# Patient Record
Sex: FEMALE | Race: WHITE | Employment: FULL TIME
[De-identification: names, ages, dates, MRNs, and addresses within clinical notes are randomized per-mention and may not be internally consistent; named-entity substitution may affect disease eponyms.]

---

## 2017-07-15 ENCOUNTER — HEALTH MAINTENANCE LETTER (OUTPATIENT)
Age: 27
End: 2017-07-15

## 2017-07-20 DIAGNOSIS — F41.9 ANXIETY DISORDER: Primary | ICD-10-CM

## 2017-07-20 LAB
HCG SERPL QL: NEGATIVE
T4 FREE SERPL-MCNC: 0.94 NG/DL (ref 0.76–1.46)
TSH SERPL DL<=0.05 MIU/L-ACNC: 1.17 MU/L (ref 0.4–4)

## 2017-07-20 PROCEDURE — 84439 ASSAY OF FREE THYROXINE: CPT | Performed by: PHYSICIAN ASSISTANT

## 2017-07-20 PROCEDURE — 84703 CHORIONIC GONADOTROPIN ASSAY: CPT | Performed by: PHYSICIAN ASSISTANT

## 2017-07-20 PROCEDURE — 84443 ASSAY THYROID STIM HORMONE: CPT | Performed by: PHYSICIAN ASSISTANT

## 2017-07-20 PROCEDURE — 36415 COLL VENOUS BLD VENIPUNCTURE: CPT | Performed by: PHYSICIAN ASSISTANT

## 2017-10-06 ENCOUNTER — OFFICE VISIT (OUTPATIENT)
Dept: FAMILY MEDICINE | Facility: CLINIC | Age: 27
End: 2017-10-06
Payer: COMMERCIAL

## 2017-10-06 VITALS
BODY MASS INDEX: 25.96 KG/M2 | HEIGHT: 65 IN | SYSTOLIC BLOOD PRESSURE: 107 MMHG | HEART RATE: 65 BPM | WEIGHT: 155.8 LBS | DIASTOLIC BLOOD PRESSURE: 70 MMHG | TEMPERATURE: 98.3 F

## 2017-10-06 DIAGNOSIS — A08.4 VIRAL GASTROENTERITIS: Primary | ICD-10-CM

## 2017-10-06 PROCEDURE — 99213 OFFICE O/P EST LOW 20 MIN: CPT | Performed by: PHYSICIAN ASSISTANT

## 2017-10-06 RX ORDER — LEVONORGESTREL AND ETHINYL ESTRADIOL 0.15-0.03
1 KIT ORAL DAILY
COMMUNITY
End: 2017-10-13

## 2017-10-06 NOTE — PATIENT INSTRUCTIONS
Viral Gastroenteritis (Adult)    Gastroenteritis is commonly called the stomach flu. It is most often caused by a virus that affects the stomach and intestinal tract and usually lasts from 2 to 7 days. Common viruses causing gastroenteritis include norovirus, rotavirus, and hepatitis A. Non-viral causes of gastroenteritis include bacteria, parasites, and toxins.  The danger from repeated vomiting or diarrhea is dehydration. This is the loss of too much fluid from the body. When this occurs, body fluids must be replaced. Antibiotics do not help with this illness because it is usually viral.Simple home treatment will be helpful.  Symptoms of viral gastroenteritis may include:    Watery, loose stools    Stomach pain or abdominal cramps    Fever and chills    Nausea and vomiting    Loss of bowel control    Headache  Home care  Gastroenteritis is transmitted by contact with the stool or vomit of an infected person. This can occur from person to person or from contact with a contaminated surface.  Follow these guidelines when caring for yourself at home:    If symptoms are severe, rest at home for the next 24 hours or until you are feeling better.    Wash your hands with soap and water or use alcohol-based  to prevent the spread of infection. Wash your hands after touching anyone who is sick.    Wash your hands or use alcohol-based  after using the toilet and before meals. Clean the toilet after each use.  Remember these tips when preparing food:    People with diarrhea should not prepare or serve food to others. When preparing foods, wash your hands before and after.    Wash your hands after using cutting boards, countertops, knives, or utensils that have been in contact with raw food.    Keep uncooked meats away from cooked and ready-to-eat foods.  Medicine  You may use acetaminophen or NSAID medicines like ibuprofen or naproxen to control fever unless another medicine was given. If you have chronic  liver or kidney disease, talk with your healthcare provider before using these medicines. Also talk with your provider if you've had a stomach ulcer or gastrointestinal bleeding. Don't give aspirin to anyone under 18 years of age who is ill with a fever. It may cause severe liver damage. Don't use NSAIDS is you are already taking one for another condition (like arthritis) or are on aspirin (such as for heart disease or after a stroke).  If medicine for vomiting or diarrhea are prescribed, take these only as directed. Do not take over-the-counter medicines for vomiting or diarrhea unless instructed by your healthcare provider.  Diet  Follow these guidelines for food:    Water and liquids are important so you don't get dehydrated. Drink a small amount at a time or suck on ice chips if you are vomiting.    If you eat, avoid fatty, greasy, spicy, or fried foods.    Don't eat dairy if you have diarrhea. This can make diarrhea worse.    Avoid tobacco, alcohol, and caffeine which may worsen symptoms.  During the first 24 hours (the first full day), follow the diet below:    Beverages. Sports drinks, soft drinks without caffeine, ginger ale, mineral water (plain or flavored), decaffeinated tea and coffee. If you are very dehydrated, sports drinks aren't a good choice. They have too much sugar and not enough electrolytes. In this case, commercially available products called oral rehydration solutions, are best.    Soups. Eat clear broth, consommé, and bouillon.    Desserts. Eat gelatin, popsicles, and fruit juice bars.  During the next 24 hours (the second day), you may add the following to the above:    Hot cereal, plain toast, bread, rolls, and crackers    Plain noodles, rice, mashed potatoes, chicken noodle or rice soup    Unsweetened canned fruit (avoid pineapple), bananas    Limit fat intake to less than 15 grams per day. Do this by avoiding margarine, butter, oils, mayonnaise, sauces, gravies, fried foods, peanut  butter, meat, poultry, and fish.    Limit fiber and avoid raw or cooked vegetables, fresh fruits (except bananas), and bran cereals.    Limit caffeine and chocolate. Don't use spices or seasonings other than salt.    Limit dairy products.    Avoid alcohol.  During the next 24 hours:    Gradually resume a normal diet as you feel better and your symptoms improve.    If at any time it starts getting worse again, go back to clear liquids until you feel better.  Follow-up care  Follow up with your healthcare provider, or as advised. Call your provider if you don't get better within 24 hours or if diarrhea lasts more than a week. Also follow up if you are unable to keep down liquids and get dehydrated. If a stool (diarrhea) sample was taken, call as directed for the results.  Call 911  Call 911 if any of these occur:    Trouble breathing    Chest pain    Confused    Severe drowsiness or trouble awakening    Fainting or loss of consciousness    Rapid heart rate    Seizure    Stiff neck  When to seek medical advice  Call your healthcare provider right away if any of these occur:    Abdominal pain that gets worse    Continued vomiting (unable to keep liquids down)    Frequent diarrhea (more than 5 times a day)    Blood in vomit or stool (black or red color)    Dark urine, reduced urine output, or extreme thirst    Weakness or dizziness    Drowsiness    Fever of 100.4 F (38 C) oral or higher that does not get better with fever medicine    New rash  Date Last Reviewed: 1/3/2016    0525-6542 [employer]. [address] [place] [address], [place] [address]. All rights reserved. This information is not intended as a substitute for professional medical care. Always follow your healthcare professional's instructions.

## 2017-10-06 NOTE — NURSING NOTE
"Chief Complaint   Patient presents with     Diarrhea       Initial /70  Pulse 65  Temp 98.3  F (36.8  C) (Tympanic)  Ht 5' 5.25\" (1.657 m)  Wt 155 lb 12.8 oz (70.7 kg)  LMP 09/13/2017 (Approximate)  Breastfeeding? No  BMI 25.73 kg/m2 Estimated body mass index is 25.73 kg/(m^2) as calculated from the following:    Height as of this encounter: 5' 5.25\" (1.657 m).    Weight as of this encounter: 155 lb 12.8 oz (70.7 kg).  Medication Reconciliation: complete     SMA L[facility]      "

## 2017-10-06 NOTE — PROGRESS NOTES
SUBJECTIVE:   Eliz Franco is a 27 year old female who presents to clinic today for the following health issues:      Diarrhea  Duration of complaint: Last night   Description:       Consistency of stool: loose       Blood in stool: no        Number of loose stools past 24 hours: 8       Fever: no        Nausea/vomitting: YES- nausea       Abdominal pain: YES- cramping       Weight loss: no        Recent antibiotics: no        Recent travel: no        Any contacts ill: YES- exposure at work  Therapies tried and outcome: PeptoBismol     *Needs a note for work, had to call in.    No blood or black in the stool.    No recent travel.                Problem list and histories reviewed & adjusted, as indicated.  Additional history: as documented    Patient Active Problem List   Diagnosis     Excessive or frequent menstruation     Other specified gastritis without mention of hemorrhage     CARDIOVASCULAR SCREENING; LDL GOAL LESS THAN 130     Adjustment disorder with mixed anxiety and depressed mood     History reviewed. No pertinent surgical history.    Social History   Substance Use Topics     Smoking status: Passive Smoke Exposure - Never Smoker     Smokeless tobacco: Never Used      Comment: DAD SMOKES OUTSIDE     Alcohol use No     Family History   Problem Relation Age of Onset     DIABETES Mother          Current Outpatient Prescriptions   Medication Sig Dispense Refill     ESCITALOPRAM OXALATE PO Take 10 mg by mouth daily       HYDROXYZINE HCL PO Take 25 mg by mouth 3 times daily as needed for itching       levonorgestrel-ethinyl estradiol (NORDETTE) 0.15-30 MG-MCG per tablet Take 1 tablet by mouth daily       BP Readings from Last 3 Encounters:   10/06/17 107/70   04/16/14 96/61   03/19/14 110/66    Wt Readings from Last 3 Encounters:   10/06/17 155 lb 12.8 oz (70.7 kg)   04/16/14 134 lb 9.6 oz (61.1 kg)   03/19/14 134 lb (60.8 kg)                        Reviewed and updated as needed this visit by clinical  "staff     Reviewed and updated as needed this visit by Provider         ROS:  Constitutional, HEENT, cardiovascular, pulmonary, gi and gu systems are negative, except as otherwise noted.      OBJECTIVE:   /70  Pulse 65  Temp 98.3  F (36.8  C) (Tympanic)  Ht 5' 5.25\" (1.657 m)  Wt 155 lb 12.8 oz (70.7 kg)  LMP 09/13/2017 (Approximate)  Breastfeeding? No  BMI 25.73 kg/m2  Body mass index is 25.73 kg/(m^2).  GENERAL: healthy, alert and no distress  ABDOMEN: soft, nontender, no hepatosplenomegaly, no masses and bowel sounds normal    Diagnostic Test Results:  none     ASSESSMENT/PLAN:         1. Viral gastroenteritis      Patient was educated on causes of gastroenteritis and the natural course of this illness.  I reviewed the importance of hydration with appropriate solute.  Avoidance of dairy may be reasonable for a few days.   I recommend patient avoid anti-diarrheal medications, such as immodium, for the time being.    Diet: BRAT diet and advance diet as tolerated      I reviewed the signs and symptoms associated with dehydration or more severe illness including fever and listlessness.  Call or return immediately if these occur.  Patient is to f/u if diarrhea persists for greater than 7 days, at which time, we will check stool cultures.      Pap scheduled for next week.    Letter for work.      FUTURE APPOINTMENTS:       - Follow-up visit if symptoms worsen or fail to improve as anticipated.     Silvia Hu PA-C  [facility]"

## 2017-10-06 NOTE — MR AVS SNAPSHOT
After Visit Summary   10/6/2017    Eliz Franco    MRN: [id number]           Patient Information     Date Of Birth          1990        Visit Information        Provider Department      10/6/2017 2:00 PM Silvia Hu PA-C [facility]        Today's Diagnoses     Viral gastroenteritis    -  1      Care Instructions      Viral Gastroenteritis (Adult)    Gastroenteritis is commonly called the stomach flu. It is most often caused by a virus that affects the stomach and intestinal tract and usually lasts from 2 to 7 days. Common viruses causing gastroenteritis include norovirus, rotavirus, and hepatitis A. Non-viral causes of gastroenteritis include bacteria, parasites, and toxins.  The danger from repeated vomiting or diarrhea is dehydration. This is the loss of too much fluid from the body. When this occurs, body fluids must be replaced. Antibiotics do not help with this illness because it is usually viral.Simple home treatment will be helpful.  Symptoms of viral gastroenteritis may include:    Watery, loose stools    Stomach pain or abdominal cramps    Fever and chills    Nausea and vomiting    Loss of bowel control    Headache  Home care  Gastroenteritis is transmitted by contact with the stool or vomit of an infected person. This can occur from person to person or from contact with a contaminated surface.  Follow these guidelines when caring for yourself at home:    If symptoms are severe, rest at home for the next 24 hours or until you are feeling better.    Wash your hands with soap and water or use alcohol-based  to prevent the spread of infection. Wash your hands after touching anyone who is sick.    Wash your hands or use alcohol-based  after using the toilet and before meals. Clean the toilet after each use.  Remember these tips when preparing food:    People with diarrhea should not prepare or serve food to others. When preparing foods, wash  your hands before and after.    Wash your hands after using cutting boards, countertops, knives, or utensils that have been in contact with raw food.    Keep uncooked meats away from cooked and ready-to-eat foods.  Medicine  You may use acetaminophen or NSAID medicines like ibuprofen or naproxen to control fever unless another medicine was given. If you have chronic liver or kidney disease, talk with your healthcare provider before using these medicines. Also talk with your provider if you've had a stomach ulcer or gastrointestinal bleeding. Don't give aspirin to anyone under 18 years of age who is ill with a fever. It may cause severe liver damage. Don't use NSAIDS is you are already taking one for another condition (like arthritis) or are on aspirin (such as for heart disease or after a stroke).  If medicine for vomiting or diarrhea are prescribed, take these only as directed. Do not take over-the-counter medicines for vomiting or diarrhea unless instructed by your healthcare provider.  Diet  Follow these guidelines for food:    Water and liquids are important so you don't get dehydrated. Drink a small amount at a time or suck on ice chips if you are vomiting.    If you eat, avoid fatty, greasy, spicy, or fried foods.    Don't eat dairy if you have diarrhea. This can make diarrhea worse.    Avoid tobacco, alcohol, and caffeine which may worsen symptoms.  During the first 24 hours (the first full day), follow the diet below:    Beverages. Sports drinks, soft drinks without caffeine, ginger ale, mineral water (plain or flavored), decaffeinated tea and coffee. If you are very dehydrated, sports drinks aren't a good choice. They have too much sugar and not enough electrolytes. In this case, commercially available products called oral rehydration solutions, are best.    Soups. Eat clear broth, consommé, and bouillon.    Desserts. Eat gelatin, popsicles, and fruit juice bars.  During the next 24 hours (the second day),  you may add the following to the above:    Hot cereal, plain toast, bread, rolls, and crackers    Plain noodles, rice, mashed potatoes, chicken noodle or rice soup    Unsweetened canned fruit (avoid pineapple), bananas    Limit fat intake to less than 15 grams per day. Do this by avoiding margarine, butter, oils, mayonnaise, sauces, gravies, fried foods, peanut butter, meat, poultry, and fish.    Limit fiber and avoid raw or cooked vegetables, fresh fruits (except bananas), and bran cereals.    Limit caffeine and chocolate. Don't use spices or seasonings other than salt.    Limit dairy products.    Avoid alcohol.  During the next 24 hours:    Gradually resume a normal diet as you feel better and your symptoms improve.    If at any time it starts getting worse again, go back to clear liquids until you feel better.  Follow-up care  Follow up with your healthcare provider, or as advised. Call your provider if you don't get better within 24 hours or if diarrhea lasts more than a week. Also follow up if you are unable to keep down liquids and get dehydrated. If a stool (diarrhea) sample was taken, call as directed for the results.  Call 911  Call 911 if any of these occur:    Trouble breathing    Chest pain    Confused    Severe drowsiness or trouble awakening    Fainting or loss of consciousness    Rapid heart rate    Seizure    Stiff neck  When to seek medical advice  Call your healthcare provider right away if any of these occur:    Abdominal pain that gets worse    Continued vomiting (unable to keep liquids down)    Frequent diarrhea (more than 5 times a day)    Blood in vomit or stool (black or red color)    Dark urine, reduced urine output, or extreme thirst    Weakness or dizziness    Drowsiness    Fever of 100.4 F (38 C) oral or higher that does not get better with fever medicine    New rash  Date Last Reviewed: 1/3/2016    5669-1178 [employer]. [address] [place] [address], [place] [address]. All rights  "reserved. This information is not intended as a substitute for professional medical care. Always follow your healthcare professional's instructions.                Follow-ups after your visit        Your next 10 appointments already scheduled     Oct 13, 2017  3:20 PM CDT   Office Visit with Silvia Hu PA-C   [facility] ([facility])    [address] [facility] [address] [facility] [address]   [phone]           Bring a current list of meds and any records pertaining to this visit. For Physicals, please bring immunization records and any forms needing to be filled out. Please arrive 10 minutes early to complete paperwork.              Who to contact     Normal or non-critical lab and imaging results will be communicated to you by [employer], letter or phone within 4 business days after the clinic has received the results. If you do not hear from us within 7 days, please contact the clinic through [employer] or phone. If you have a critical or abnormal lab result, we will notify you by phone as soon as possible.  Submit refill requests through [email or web address] or call your pharmacy and they will forward the refill request to us. Please allow 3 business days for your refill to be completed.          If you need to speak with a  for additional information , please call: [phone]           Additional Information About Your Visit        [email or web address] Information     [email or web address] lets you send messages to your doctor, view your test results, renew your prescriptions, schedule appointments and more. To sign up, go to [email or web address] . Click on \"Log in\" on the left side of the screen, which will take you to the Welcome page. Then click on \"Sign up Now\" on the right side of the page.     You will be asked to enter the access code listed below, as well as some personal information. Please follow the directions to create your username and password.     Your access code is: " [email or web address]  Expires: 2018  2:17 PM     Your access code will  in 90 days. If you need help or a new code, please call your [place] clinic or [phone].        Care EveryWhere ID     This is your Care EveryWhere ID. This could be used by other organizations to access your [facility] medical records  [id number]        Your Vitals Were     Pulse Temperature Height Last Period Breastfeeding? BMI (Body Mass Index)    65 98.3  F (36.8  C) (Tympanic) 5' 5.25\" (1.657 m) 2017 (Approximate) No 25.73 kg/m2       Blood Pressure from Last 3 Encounters:   10/06/17 107/70   14 96/61   14 110/66    Weight from Last 3 Encounters:   10/06/17 155 lb 12.8 oz (70.7 kg)   14 134 lb 9.6 oz (61.1 kg)   14 134 lb (60.8 kg)              Today, you had the following     No orders found for display       Primary Care Provider Office Phone # Fax #    Linda Arreola [phone] [address] [facility] [address]        Equal Access to Services     GREGG NEAL AH: Hadii aad ku hadasho Soomaali, waaxda luqadaha, qaybta kaalmada adeegyada, waxay idiin haymichaeln ismael gregory la'uli ah. So [facility] [phone].    ATENCIÓN: Si habla español, tiene a rudd disposición servicios gratuitos de asistencia lingüística. Llame al [phone].    We comply with applicable federal civil rights laws and [place] laws. We do not discriminate on the basis of race, color, national origin, age, disability, sex, sexual orientation, or gender identity.            Thank you!     Thank you for choosing [facility]  for your care. Our goal is always to provide you with excellent care. Hearing back from our patients is one way we can continue to improve our services. Please take a few minutes to complete the written survey that you may receive in the mail after your visit with us. Thank you!             Your Updated Medication List - Protect others around you: Learn how to safely use, " store and throw away your medicines at www.disposemymeds.org.          This list is accurate as of: 10/6/17  2:17 PM.  Always use your most recent med list.                   Brand Name Dispense Instructions for use Diagnosis    ESCITALOPRAM OXALATE PO      Take 10 mg by mouth daily        HYDROXYZINE HCL PO      Take 25 mg by mouth 3 times daily as needed for itching        levonorgestrel-ethinyl estradiol 0.15-30 MG-MCG per tablet    DAVID     Take 1 tablet by mouth daily

## 2017-10-06 NOTE — LETTER
[address] [address]  Phone: [phone]    October 6, 2017        Eliz Franco  [address] [place] [address]          To whom it may concern:    RE: Eliz Franco    Patient was seen and treated today at our clinic for gastroenteritis and missed work.  She may return to work when she is feeling better.     Please contact me for questions or concerns.      Sincerely,        Silvia Hu PA-C

## 2017-10-13 ENCOUNTER — OFFICE VISIT (OUTPATIENT)
Dept: FAMILY MEDICINE | Facility: CLINIC | Age: 27
End: 2017-10-13
Payer: COMMERCIAL

## 2017-10-13 VITALS
SYSTOLIC BLOOD PRESSURE: 105 MMHG | WEIGHT: 158.6 LBS | DIASTOLIC BLOOD PRESSURE: 60 MMHG | TEMPERATURE: 98.5 F | BODY MASS INDEX: 26.42 KG/M2 | HEIGHT: 65 IN | HEART RATE: 65 BPM

## 2017-10-13 DIAGNOSIS — Z00.00 ROUTINE GENERAL MEDICAL EXAMINATION AT A HEALTH CARE FACILITY: Primary | ICD-10-CM

## 2017-10-13 DIAGNOSIS — Z28.21 INFLUENZA VACCINATION DECLINED BY PATIENT: ICD-10-CM

## 2017-10-13 DIAGNOSIS — F43.23 ADJUSTMENT DISORDER WITH MIXED ANXIETY AND DEPRESSED MOOD: ICD-10-CM

## 2017-10-13 DIAGNOSIS — Z30.41 ENCOUNTER FOR SURVEILLANCE OF CONTRACEPTIVE PILLS: ICD-10-CM

## 2017-10-13 PROCEDURE — 87491 CHLMYD TRACH DNA AMP PROBE: CPT | Performed by: PHYSICIAN ASSISTANT

## 2017-10-13 PROCEDURE — G0145 SCR C/V CYTO,THINLAYER,RESCR: HCPCS | Performed by: PHYSICIAN ASSISTANT

## 2017-10-13 PROCEDURE — 87591 N.GONORRHOEAE DNA AMP PROB: CPT | Performed by: PHYSICIAN ASSISTANT

## 2017-10-13 PROCEDURE — 99395 PREV VISIT EST AGE 18-39: CPT | Performed by: PHYSICIAN ASSISTANT

## 2017-10-13 RX ORDER — LEVONORGESTREL AND ETHINYL ESTRADIOL 0.15-0.03
1 KIT ORAL DAILY
Qty: 90 TABLET | Refills: 3 | Status: SHIPPED | OUTPATIENT
Start: 2017-10-13 | End: 2018-09-24

## 2017-10-13 NOTE — PROGRESS NOTES
SUBJECTIVE:   CC: Eliz Franco is an 27 year old woman who presents for preventive health visit.     Healthy Habits:    Do you get at least three servings of calcium containing foods daily (dairy, green leafy vegetables, etc.)? yes    Amount of exercise or daily activities, outside of work: 1 day(s) per week    Problems taking medications regularly No    Medication side effects: No    Have you had an eye exam in the past two years? yes    Do you see a dentist twice per year? yes    Do you have sleep apnea, excessive snoring or daytime drowsiness?no        Depression/anxiety:  Managed by [facility].       Today's PHQ-2 Score: PHQ-2 ( 1999 Pfizer) 10/6/2017 4/16/2014   Q1: Little interest or pleasure in doing things 0 1   Q2: Feeling down, depressed or hopeless 0 0   PHQ-2 Score 0 1         Abuse: Current or Past(Physical, Sexual or Emotional)- No  Do you feel safe in your environment - Yes  Social History   Substance Use Topics     Smoking status: Passive Smoke Exposure - Never Smoker     Smokeless tobacco: Never Used      Comment: DAD SMOKES OUTSIDE     Alcohol use No     The patient does not drink >3 drinks per day nor >7 drinks per week.    Reviewed orders with patient.  Reviewed health maintenance and updated orders accordingly - Yes  Labs reviewed in EPIC  BP Readings from Last 3 Encounters:   10/13/17 105/60   10/06/17 107/70   04/16/14 96/61    Wt Readings from Last 3 Encounters:   10/13/17 158 lb 9.6 oz (71.9 kg)   10/06/17 155 lb 12.8 oz (70.7 kg)   04/16/14 134 lb 9.6 oz (61.1 kg)                  Current Outpatient Prescriptions   Medication Sig Dispense Refill     levonorgestrel-ethinyl estradiol (NORDETTE) 0.15-30 MG-MCG per tablet Take 1 tablet by mouth daily 90 tablet 3     ESCITALOPRAM OXALATE PO Take 10 mg by mouth daily       HYDROXYZINE HCL PO Take 25 mg by mouth 3 times daily as needed for itching       [DISCONTINUED] levonorgestrel-ethinyl estradiol (NORDETTE) 0.15-30 MG-MCG  per tablet Take 1 tablet by mouth daily           Mammogram not appropriate for this patient based on age.    Pertinent mammograms are reviewed under the imaging tab.  History of abnormal Pap smear: NO - age 21-29 PAP every 3 years recommended    Reviewed and updated as needed this visit by clinical staff         Reviewed and updated as needed this visit by Provider              ROS:  C: NEGATIVE for fever, chills, change in weight  I: NEGATIVE for worrisome rashes, moles or lesions  E: NEGATIVE for vision changes or irritation  ENT: NEGATIVE for ear, mouth and throat problems  R: NEGATIVE for significant cough or SOB  B: NEGATIVE for masses, tenderness or discharge  CV: NEGATIVE for chest pain, palpitations or peripheral edema  GI: NEGATIVE for nausea, abdominal pain, heartburn, or change in bowel habits  : NEGATIVE for unusual urinary or vaginal symptoms. Periods are regular.  M: NEGATIVE for significant arthralgias or myalgia  N: NEGATIVE for weakness, dizziness or paresthesias  P: NEGATIVE for changes in mood or affect    OBJECTIVE:   LMP 09/13/2017 (Approximate)  EXAM:  GENERAL: healthy, alert and no distress  EYES: Eyes grossly normal to inspection, PERRL and conjunctivae and sclerae normal  HENT: ear canals and TM's normal, nose and mouth without ulcers or lesions  NECK: no adenopathy, no asymmetry, masses, or scars and thyroid normal to palpation  RESP: lungs clear to auscultation - no rales, rhonchi or wheezes  BREAST: normal without masses, tenderness or nipple discharge and no palpable axillary masses or adenopathy  CV: regular rate and rhythm, normal S1 S2, no S3 or S4, no murmur, click or rub, no peripheral edema and peripheral pulses strong  ABDOMEN: soft, nontender, no hepatosplenomegaly, no masses and bowel sounds normal   (female): normal female external genitalia, normal urethral meatus, vaginal mucosa pink, moist, well rugated, and normal cervix/adnexa/uterus without masses or discharge  MS:  "no gross musculoskeletal defects noted, no edema  SKIN: no suspicious lesions or rashes  NEURO: Normal strength and tone, mentation intact and speech normal  PSYCH: mentation appears normal, affect normal/bright    ASSESSMENT/PLAN:   1. Routine general medical examination at a health care facility       HEALTH CARE MAINTENANCE              Reviewed USPTF recommendations and anticipatory guidance.              See orders.   I discussed in detail with Eliz Franco the importance of cervical cancer screenings through pap smears, will repeat pap every 3 year(s).        - NEISSERIA GONORRHOEA PCR  - CHLAMYDIA TRACHOMATIS PCR  - Pap imaged thin layer screen reflex to HPV if ASCUS - recommend age 25 - 29    2. Adjustment disorder with mixed anxiety and depressed mood  Managed by [facility]    3. Encounter for surveillance of contraceptive pills  Doing well.   - levonorgestrel-ethinyl estradiol (NORDETTE) 0.15-30 MG-MCG per tablet; Take 1 tablet by mouth daily  Dispense: 90 tablet; Refill: 3    4. Influenza vaccination declined by patient        COUNSELING:   Reviewed preventive health counseling, as reflected in patient instructions       Regular exercise       Healthy diet/nutrition       Contraception       Safe sex practices/STD prevention         reports that she is a non-smoker but has been exposed to tobacco smoke. She has never used smokeless tobacco.    Estimated body mass index is 25.73 kg/(m^2) as calculated from the following:    Height as of 10/6/17: 5' 5.25\" (1.657 m).    Weight as of 10/6/17: 155 lb 12.8 oz (70.7 kg).         Counseling Resources:  ATP IV Guidelines  Pooled Cohorts Equation Calculator  Breast Cancer Risk Calculator  FRAX Risk Assessment  ICSI Preventive Guidelines  Dietary Guidelines for Americans, 2010  USDA's MyPlate  ASA Prophylaxis  Lung CA Screening    Silvia Hu PA-C  [facility]  "

## 2017-10-13 NOTE — MR AVS SNAPSHOT
After Visit Summary   10/13/2017    Eliz Franco    MRN: [id number]           Patient Information     Date Of Birth          1990        Visit Information        Provider Department      10/13/2017 3:20 PM Silvia Hu PA-C [facility]        Today's Diagnoses     Routine general medical examination at a health care facility    -  1    Adjustment disorder with mixed anxiety and depressed mood        Encounter for surveillance of contraceptive pills        Influenza vaccination declined by patient          Care Instructions      Preventive Health Recommendations  Female Ages 18 to 25     Yearly exam:     See your health care provider every year in order to  o Review health changes.   o Discuss preventive care.    o Review your medicines if your doctor has prescribed any.      You should be tested each year for STDs (sexually transmitted diseases).       After age 20, talk to your provider about how often you should have cholesterol testing.      Starting at age 21, get a Pap test every three years. If you have an abnormal result, your doctor may have you test more often.      If you are at risk for diabetes, you should have a diabetes test (fasting glucose).     Shots:     Get a flu shot each year.     Get a tetanus shot every 10 years.     Consider getting the shot (vaccine) that prevents cervical cancer (Gardasil).    Nutrition:     Eat at least 5 servings of fruits and vegetables each day.    Eat whole-grain bread, whole-wheat pasta and brown rice instead of white grains and rice.    Talk to your provider about Calcium and Vitamin D.     Lifestyle    Exercise at least 150 minutes a week each week (30 minutes a day, 5 days a week). This will help you control your weight and prevent disease.    Limit alcohol to one drink per day.    No smoking.     Wear sunscreen to prevent skin cancer.    See your dentist every six months for an exam and cleaning.          Follow-ups  "after your visit        Who to contact     Normal or non-critical lab and imaging results will be communicated to you by iHELP Worldhart, letter or phone within 4 business days after the clinic has received the results. If you do not hear from us within 7 days, please contact the clinic through iHELP Worldhart or phone. If you have a critical or abnormal lab result, we will notify you by phone as soon as possible.  Submit refill requests through IceBreaker or call your pharmacy and they will forward the refill request to us. Please allow 3 business days for your refill to be completed.          If you need to speak with a  for additional information , please call: [phone]           Additional Information About Your Visit        IceBreaker Information     IceBreaker lets you send messages to your doctor, view your test results, renew your prescriptions, schedule appointments and more. To sign up, go to [email or web address] . Click on \"Log in\" on the left side of the screen, which will take you to the Welcome page. Then click on \"Sign up Now\" on the right side of the page.     You will be asked to enter the access code listed below, as well as some personal information. Please follow the directions to create your username and password.     Your access code is: [email or web address]  Expires: 2018  2:17 PM     Your access code will  in 90 days. If you need help or a new code, please call your [facility] clinic or [phone].        Care EveryWhere ID     This is your Care EveryWhere ID. This could be used by other organizations to access your [facility] medical records  [id number]        Your Vitals Were     Pulse Temperature Height Last Period Breastfeeding? BMI (Body Mass Index)    65 98.5  F (36.9  C) (Tympanic) 5' 5.25\" (1.657 m) 2017 (Approximate) No 26.19 kg/m2       Blood Pressure from Last 3 Encounters:   10/13/17 105/60   10/06/17 107/70   14 96/61    Weight from Last 3 Encounters:   10/13/17 158 " lb 9.6 oz (71.9 kg)   10/06/17 155 lb 12.8 oz (70.7 kg)   04/16/14 134 lb 9.6 oz (61.1 kg)              We Performed the Following     CHLAMYDIA TRACHOMATIS PCR     NEISSERIA GONORRHOEA PCR     Pap imaged thin layer screen reflex to HPV if ASCUS - recommend age 25 - 29          Where to get your medicines      These medications were sent to [facility] [id number] - [address]  AT [facility] & [address] , [facility] [address]     Phone:  [phone]     levonorgestrel-ethinyl estradiol 0.15-30 MG-MCG per tablet          Primary Care Provider Office Phone # Fax #    Linda Arreola [phone] [address] [facility] [address]        Equal Access to Services     [facility] : Hadii marlon carlisle hadasho Soomaali, waaxda luqadaha, qaybta kaalmada adeegyada, theron parsons hayuli hernandez . So [facility] [phone].    ATENCIÓN: Si habla español, tiene a rudd disposición servicios gratuitos de asistencia lingüística. Llame al [phone].    We comply with applicable federal civil rights laws and [place] laws. We do not discriminate on the basis of race, color, national origin, age, disability, sex, sexual orientation, or gender identity.            Thank you!     Thank you for choosing [facility]  for your care. Our goal is always to provide you with excellent care. Hearing back from our patients is one way we can continue to improve our services. Please take a few minutes to complete the written survey that you may receive in the mail after your visit with us. Thank you!             Your Updated Medication List - Protect others around you: Learn how to safely use, store and throw away your medicines at www.disposemymeds.org.          This list is accurate as of: 10/13/17  3:41 PM.  Always use your most recent med list.                   Brand Name Dispense Instructions for use Diagnosis    ESCITALOPRAM OXALATE PO       Take 10 mg by mouth daily        HYDROXYZINE HCL PO      Take 25 mg by mouth 3 times daily as needed for itching        levonorgestrel-ethinyl estradiol 0.15-30 MG-MCG per tablet    NORDETTE    90 tablet    Take 1 tablet by mouth daily    Encounter for surveillance of contraceptive pills

## 2017-10-13 NOTE — NURSING NOTE
"Chief Complaint   Patient presents with     Physical       Initial /60  Pulse 65  Temp 98.5  F (36.9  C) (Tympanic)  Ht 5' 5.25\" (1.657 m)  Wt 158 lb 9.6 oz (71.9 kg)  LMP 09/13/2017 (Approximate)  Breastfeeding? No  BMI 26.19 kg/m2 Estimated body mass index is 26.19 kg/(m^2) as calculated from the following:    Height as of this encounter: 5' 5.25\" (1.657 m).    Weight as of this encounter: 158 lb 9.6 oz (71.9 kg).  Medication Reconciliation: complete     Yuli Epps MA      "

## 2017-10-15 LAB
C TRACH DNA SPEC QL NAA+PROBE: NEGATIVE
N GONORRHOEA DNA SPEC QL NAA+PROBE: NEGATIVE
SPECIMEN SOURCE: NORMAL
SPECIMEN SOURCE: NORMAL

## 2017-10-17 LAB
COPATH REPORT: NORMAL
PAP: NORMAL

## 2018-01-23 ENCOUNTER — OFFICE VISIT (OUTPATIENT)
Dept: FAMILY MEDICINE | Facility: CLINIC | Age: 28
End: 2018-01-23
Payer: COMMERCIAL

## 2018-01-23 VITALS
SYSTOLIC BLOOD PRESSURE: 110 MMHG | TEMPERATURE: 99.2 F | HEIGHT: 65 IN | WEIGHT: 165 LBS | DIASTOLIC BLOOD PRESSURE: 59 MMHG | HEART RATE: 92 BPM | OXYGEN SATURATION: 96 % | BODY MASS INDEX: 27.49 KG/M2

## 2018-01-23 DIAGNOSIS — R07.0 THROAT PAIN: Primary | ICD-10-CM

## 2018-01-23 LAB
DEPRECATED S PYO AG THROAT QL EIA: NORMAL
SPECIMEN SOURCE: NORMAL

## 2018-01-23 PROCEDURE — 87081 CULTURE SCREEN ONLY: CPT | Performed by: FAMILY MEDICINE

## 2018-01-23 PROCEDURE — 87880 STREP A ASSAY W/OPTIC: CPT | Performed by: FAMILY MEDICINE

## 2018-01-23 PROCEDURE — 99213 OFFICE O/P EST LOW 20 MIN: CPT | Performed by: FAMILY MEDICINE

## 2018-01-23 ASSESSMENT — ANXIETY QUESTIONNAIRES
3. WORRYING TOO MUCH ABOUT DIFFERENT THINGS: SEVERAL DAYS
6. BECOMING EASILY ANNOYED OR IRRITABLE: NOT AT ALL
5. BEING SO RESTLESS THAT IT IS HARD TO SIT STILL: NOT AT ALL
1. FEELING NERVOUS, ANXIOUS, OR ON EDGE: SEVERAL DAYS
GAD7 TOTAL SCORE: 2
IF YOU CHECKED OFF ANY PROBLEMS ON THIS QUESTIONNAIRE, HOW DIFFICULT HAVE THESE PROBLEMS MADE IT FOR YOU TO DO YOUR WORK, TAKE CARE OF THINGS AT HOME, OR GET ALONG WITH OTHER PEOPLE: NOT DIFFICULT AT ALL
7. FEELING AFRAID AS IF SOMETHING AWFUL MIGHT HAPPEN: NOT AT ALL
2. NOT BEING ABLE TO STOP OR CONTROL WORRYING: NOT AT ALL

## 2018-01-23 ASSESSMENT — PATIENT HEALTH QUESTIONNAIRE - PHQ9
5. POOR APPETITE OR OVEREATING: NOT AT ALL
SUM OF ALL RESPONSES TO PHQ QUESTIONS 1-9: 3

## 2018-01-23 NOTE — LETTER
[facility]  [address] [facility] [address]  Phone: [phone]    January 23, 2018        Eliz Mckinney  [address] [facility] [address] [place] [address]          To whom it may concern:    RE: Eliz Mckinney    Patient was seen and treated today at our clinic and missed work.    Please contact me for questions or concerns.      Sincerely,        Malcolm Solares MD

## 2018-01-23 NOTE — LETTER
My Depression Action Plan  Name: Eliz Franco   Date of Birth 1990  Date: 1/23/2018    My doctor: Linda Arreola   My clinic: [facility]  [address] [place] [address] [phone]          GREEN    ZONE   Good Control    What it looks like:     Things are going generally well. You have normal up s and down s. You may even feel depressed from time to time, but bad moods usually last less than a day.   What you need to do:  1. Continue to care for yourself (see self care plan)  2. Check your depression survival kit and update it as needed  3. Follow your physician s recommendations including any medication.  4. Do not stop taking medication unless you consult with your physician first.           YELLOW         ZONE Getting Worse    What it looks like:     Depression is starting to interfere with your life.     It may be hard to get out of bed; you may be starting to isolate yourself from others.    Symptoms of depression are starting to last most all day and this has happened for several days.     You may have suicidal thoughts but they are not constant.   What you need to do:     1. Call your care team, your response to treatment will improve if you keep your care team informed of your progress. Yellow periods are signs an adjustment may need to be made.     2. Continue your self-care, even if you have to fake it!    3. Talk to someone in your support network    4. Open up your depression survival kit           RED    ZONE Medical Alert - Get Help    What it looks like:     Depression is seriously interfering with your life.     You may experience these or other symptoms: You can t get out of bed most days, can t work or engage in other necessary activities, you have trouble taking care of basic hygiene, or basic responsibilities, thoughts of suicide or death that will not go away, self-injurious behavior.     What you need to do:  1. Call your care team and  request a same-day appointment. If they are not available (weekends or after hours) call your local crisis line, emergency room or 911.      Electronically signed by: Lisa Dudley, January 23, 2018    Depression Self Care Plan / Survival Kit    Self-Care for Depression  Here s the deal. Your body and mind are really not as separate as most people think.  What you do and think affects how you feel and how you feel influences what you do and think. This means if you do things that people who feel good do, it will help you feel better.  Sometimes this is all it takes.  There is also a place for medication and therapy depending on how severe your depression is, so be sure to consult with your medical provider and/ or Behavioral Health Consultant if your symptoms are worsening or not improving.     In order to better manage my stress, I will:    Exercise  Get some form of exercise, every day. This will help reduce pain and release endorphins, the  feel good  chemicals in your brain. This is almost as good as taking antidepressants!  This is not the same as joining a gym and then never going! (they count on that by the way ) It can be as simple as just going for a walk or doing some gardening, anything that will get you moving.      Hygiene   Maintain good hygiene (Get out of bed in the morning, Make your bed, Brush your teeth, Take a shower, and Get dressed like you were going to work, even if you are unemployed).  If your clothes don't fit try to get ones that do.    Diet  I will strive to eat foods that are good for me, drink plenty of water, and avoid excessive sugar, caffeine, alcohol, and other mood-altering substances.  Some foods that are helpful in depression are: complex carbohydrates, B vitamins, flaxseed, fish or fish oil, fresh fruits and vegetables.    Psychotherapy  I agree to participate in Individual Therapy (if recommended).    Medication  If prescribed medications, I agree to take them.  Missing  doses can result in serious side effects.  I understand that drinking alcohol, or other illicit drug use, may cause potential side effects.  I will not stop my medication abruptly without first discussing it with my provider.    Staying Connected With Others  I will stay in touch with my friends, family members, and my primary care provider/team.    Use your imagination  Be creative.  We all have a creative side; it doesn t matter if it s oil painting, sand castles, or mud pies! This will also kick up the endorphins.    Witness Beauty  (AKA stop and smell the roses) Take a look outside, even in mid-winter. Notice colors, textures. Watch the squirrels and birds.     Service to others  Be of service to others.  There is always someone else in need.  By helping others we can  get out of ourselves  and remember the really important things.  This also provides opportunities for practicing all the other parts of the program.    Humor  Laugh and be silly!  Adjust your TV habits for less news and crime-drama and more comedy.    Control your stress  Try breathing deep, massage therapy, biofeedback, and meditation. Find time to relax each day.     My support system    Clinic Contact:  Phone number:    Contact 1:  Phone number:    Contact 2:  Phone number:    Rastafarian/:  Phone number:    Therapist:  Phone number:    Local crisis center:    Phone number:    Other community support:  Phone number:

## 2018-01-23 NOTE — MR AVS SNAPSHOT
"              After Visit Summary   1/23/2018    Eliz Mckinney    MRN: [id number]           Patient Information     Date Of Birth          1990        Visit Information        Provider Department      1/23/2018 1:30 PM Malcolm Solares MD [facility]        Today's Diagnoses     Throat pain    -  1       Follow-ups after your visit        Who to contact     If you have questions or need follow up information about today's clinic visit or your schedule please contact [facility] directly at [phone].  Normal or non-critical lab and imaging results will be communicated to you by MyChart, letter or phone within 4 business days after the clinic has received the results. If you do not hear from us within 7 days, please contact the clinic through DDRdrivet or phone. If you have a critical or abnormal lab result, we will notify you by phone as soon as possible.  Submit refill requests through OxThera or call your pharmacy and they will forward the refill request to us. Please allow 3 business days for your refill to be completed.          Additional Information About Your Visit        MyChart Information     OxThera gives you secure access to your electronic health record. If you see a primary care provider, you can also send messages to your care team and make appointments. If you have questions, please call your primary care clinic.  If you do not have a primary care provider, please call [phone] and they will assist you.        Care EveryWhere ID     This is your Care EveryWhere ID. This could be used by other organizations to access your [facility] medical records  [id number]        Your Vitals Were     Pulse Temperature Height Pulse Oximetry BMI (Body Mass Index)       92 99.2  F (37.3  C) (Oral) 5' 5\" (1.651 m) 96% 27.46 kg/m2        Blood Pressure from Last 3 Encounters:   01/23/18 110/59   10/13/17 105/60   10/06/17 107/70    Weight from Last 3 Encounters:   01/23/18 165 " lb (74.8 kg)   10/13/17 158 lb 9.6 oz (71.9 kg)   10/06/17 155 lb 12.8 oz (70.7 kg)              We Performed the Following     Beta strep group A culture     Rapid strep screen        Primary Care Provider Office Phone # Fax #    Linda Arreola [phone] [address]  [facility] [address]        Equal Access to Services     GREGG NEAL : Hadii aad ku hadasho Soomaali, waaxda luqadaha, qaybta kaalmada adeegyada, waxay idiin hayaan adeeg khkaterinsh latristonn . So [facility] [phone].    ATENCIÓN: Si daniellala alexandra, tiene a rudd disposición servicios gratuitos de asistencia lingüística. Llame al [phone].    We comply with applicable federal civil rights laws and [place] laws. We do not discriminate on the basis of race, color, national origin, age, disability, sex, sexual orientation, or gender identity.            Thank you!     [facility] you [facility]  [facility]. Our goal is always to provide you with excellent care. Hearing back from our patients is one way we can continue to improve our services. Please take a few minutes to complete the written survey that you may receive in the mail after your visit with us. Thank you!             Your Updated Medication List - Protect others around you: Learn how to safely use, store and throw away your medicines at www.disposemymeds.org.          This list is accurate as of: 1/23/18  1:51 PM.  Always use your most recent med list.                   Brand Name Dispense Instructions for use Diagnosis    ESCITALOPRAM OXALATE PO      Take 10 mg by mouth daily        HYDROXYZINE HCL PO      Take 25 mg by mouth 3 times daily as needed for itching        levonorgestrel-ethinyl estradiol 0.15-30 MG-MCG per tablet    NORDETTE    90 tablet    Take 1 tablet by mouth daily    Encounter for surveillance of contraceptive pills

## 2018-01-23 NOTE — NURSING NOTE
"Chief Complaint   Patient presents with     Pharyngitis     started yesterday.        Initial /59  Pulse 92  Temp 99.2  F (37.3  C) (Oral)  Ht 5' 5\" (1.651 m)  Wt 165 lb (74.8 kg)  SpO2 96%  BMI 27.46 kg/m2 Estimated body mass index is 27.46 kg/(m^2) as calculated from the following:    Height as of this encounter: 5' 5\" (1.651 m).    Weight as of this encounter: 165 lb (74.8 kg).  Medication Reconciliation: complete     Lisa Dudley cma    "

## 2018-01-23 NOTE — PROGRESS NOTES
"SUBJECTIVE:  Eliz Mckinney is a 27 year old female who presents to clinic with a chief complaint of a sore throat that started 1 day(s) ago.  The patient described the pain or symptoms as moderate.  The patient reports that in addition to the sore throat she has symptoms that include:clearing her throat frequently     The patient (or a parent) denies any fever and rhinorrhea.    She(or a parent) reports exposure to Strep at school or work. On Saturday she was around some young relatives that were diagnosed with strep in the last few day(s).    She (or a parent) reports a history of mononucleosis I high school.     History reviewed. No pertinent past medical history.  Current Outpatient Prescriptions   Medication Sig Dispense Refill     levonorgestrel-ethinyl estradiol (NORDETTE) 0.15-30 MG-MCG per tablet Take 1 tablet by mouth daily 90 tablet 3     ESCITALOPRAM OXALATE PO Take 10 mg by mouth daily       HYDROXYZINE HCL PO Take 25 mg by mouth 3 times daily as needed for itching       Social History   Substance Use Topics     Smoking status: Passive Smoke Exposure - Never Smoker     Smokeless tobacco: Never Used      Comment: DAD SMOKES OUTSIDE     Alcohol use No           OBJECTIVE:   /59  Pulse 92  Temp 99.2  F (37.3  C) (Oral)  Ht 5' 5\" (1.651 m)  Wt 165 lb (74.8 kg)  SpO2 96%  BMI 27.46 kg/m2      GENERAL APPEARANCE: healthy, alert and no distress  EYES: EOMI,  PERRL, conjunctiva clear  HENT: ear canals and TM's normal.  Nose normal.  Pharynx erythematous with no exudate noted.  NECK: supple, non-tender to palpation, no adenopathy noted  RESP: lungs clear to auscultation - no rales, rhonchi or wheezes  CV: regular rates and rhythm, normal S1 S2, no murmur noted  ABDOMEN:  soft, nontender, no HSM or masses and bowel sounds normal  SKIN: no suspicious lesions or rashes      Results for orders placed or performed in visit on 01/23/18   Rapid strep screen   Result Value Ref Range    Specimen Description " Throat     Rapid Strep A Screen       NEGATIVE: No Group A streptococcal antigen detected by immunoassay, await culture report.         ASSESSMENT:  Viral pharyngitis    PLAN:   See orders in epic.     Symptomatic treatment recommended including: salt water gargles, Chloraseptic spray, Cepacol lozenges, acetominophen, and ibuprofen.   Follow-up in 2 weeks from the onset of symptoms if the sore throat is not improving.

## 2018-01-24 LAB
BACTERIA SPEC CULT: NORMAL
SPECIMEN SOURCE: NORMAL

## 2018-01-24 ASSESSMENT — ANXIETY QUESTIONNAIRES: GAD7 TOTAL SCORE: 2

## 2018-04-23 ENCOUNTER — RADIANT APPOINTMENT (OUTPATIENT)
Dept: GENERAL RADIOLOGY | Facility: CLINIC | Age: 28
End: 2018-04-23
Attending: PHYSICIAN ASSISTANT
Payer: COMMERCIAL

## 2018-04-23 ENCOUNTER — OFFICE VISIT (OUTPATIENT)
Dept: FAMILY MEDICINE | Facility: CLINIC | Age: 28
End: 2018-04-23
Payer: COMMERCIAL

## 2018-04-23 VITALS
DIASTOLIC BLOOD PRESSURE: 61 MMHG | BODY MASS INDEX: 29.19 KG/M2 | TEMPERATURE: 98.9 F | SYSTOLIC BLOOD PRESSURE: 99 MMHG | WEIGHT: 175.2 LBS | HEIGHT: 65 IN

## 2018-04-23 DIAGNOSIS — M54.50 ACUTE RIGHT-SIDED LOW BACK PAIN WITHOUT SCIATICA: ICD-10-CM

## 2018-04-23 DIAGNOSIS — M53.3 SACROILIAC JOINT PAIN: Primary | ICD-10-CM

## 2018-04-23 LAB — BETA HCG QUAL IFA URINE: NEGATIVE

## 2018-04-23 PROCEDURE — 72220 X-RAY EXAM SACRUM TAILBONE: CPT | Mod: FY

## 2018-04-23 PROCEDURE — 99214 OFFICE O/P EST MOD 30 MIN: CPT | Performed by: PHYSICIAN ASSISTANT

## 2018-04-23 PROCEDURE — 84703 CHORIONIC GONADOTROPIN ASSAY: CPT | Performed by: PHYSICIAN ASSISTANT

## 2018-04-23 RX ORDER — CYCLOBENZAPRINE HCL 10 MG
5-10 TABLET ORAL 3 TIMES DAILY PRN
Qty: 30 TABLET | Refills: 1 | Status: SHIPPED | OUTPATIENT
Start: 2018-04-23 | End: 2019-12-03

## 2018-04-23 NOTE — NURSING NOTE
"Chief Complaint   Patient presents with     Back Pain       Initial BP 99/61  Temp 98.9  F (37.2  C) (Tympanic)  Ht 5' 5\" (1.651 m)  Wt 175 lb 3.2 oz (79.5 kg)  LMP 03/23/2018 (Approximate)  Breastfeeding? No  BMI 29.15 kg/m2 Estimated body mass index is 29.15 kg/(m^2) as calculated from the following:    Height as of this encounter: 5' 5\" (1.651 m).    Weight as of this encounter: 175 lb 3.2 oz (79.5 kg).  Medication Reconciliation: complete     Yuli Epps MA      "

## 2018-04-23 NOTE — PROGRESS NOTES
SUBJECTIVE:   Eliz Mckinney is a 27 year old female who presents to clinic today for the following health issues:      Back Pain       Duration: day 4, however has had pain off and on for years (yet she is only 27)        Specific cause: Slept on it funny     Description:   Location of pain: low back both  Character of pain: sharp and dull ache  Pain radiation:radiates into the right leg and radiates into the left leg  New numbness or weakness in legs, not attributed to pain:  no     Intensity: Currently 1-2/10    History:   Pain interferes with job: YES,   History of back problems: recurrent self limited episodes of low back pain in the past  Any previous MRI or X-rays: None  Sees a specialist for back pain:  No  Therapies tried without relief: Physical Therapy    Alleviating factors:   Improved by: heat and NSAIDs      Precipitating factors:  Worsened by: Lifting, Standing and Sitting    Functional and Psychosocial Screen (Rosario STarT Back):      Not performed today    No injury recalled.    Has h/o self limited back pain flareups since she was a child.        Accompanying Signs & Symptoms:  Risk of Fracture:  None  Risk of Cauda Equina:  None  Risk of Infection:  None  Risk of Cancer:  None  Risk of Ankylosing Spondylitis:  Onset at age <35, male, AND morning back stiffness. no     Takes ibuprofen 200-800 mg PRN, with short term relief.    She c/o tight sensation in her back.      Pain is constant, will worsen (flare-up) at times.   Goes to the gym a couple times a week.  Workout's to strengthen the core seem to make it worse.      She did a round of physical therapy in the past ,and this did help.    No imaging done ever.        Problem list and histories reviewed & adjusted, as indicated.  Additional history: as documented    Patient Active Problem List   Diagnosis     Excessive or frequent menstruation     Other specified gastritis without mention of hemorrhage     CARDIOVASCULAR SCREENING; LDL GOAL LESS  "THAN 130     Adjustment disorder with mixed anxiety and depressed mood     Past Surgical History:   Procedure Laterality Date     NO HISTORY OF SURGERY         Social History   Substance Use Topics     Smoking status: Passive Smoke Exposure - Never Smoker     Smokeless tobacco: Never Used      Comment: DAD SMOKES OUTSIDE     Alcohol use No     Family History   Problem Relation Age of Onset     DIABETES Mother      Depression Mother      CANCER Maternal Grandfather      bladder/kidney         Current Outpatient Prescriptions   Medication Sig Dispense Refill     ESCITALOPRAM OXALATE PO Take 10 mg by mouth daily       HYDROXYZINE HCL PO Take 25 mg by mouth 3 times daily as needed for itching       levonorgestrel-ethinyl estradiol (NORDETTE) 0.15-30 MG-MCG per tablet Take 1 tablet by mouth daily 90 tablet 3     BP Readings from Last 3 Encounters:   04/23/18 99/61   01/23/18 110/59   10/13/17 105/60    Wt Readings from Last 3 Encounters:   04/23/18 175 lb 3.2 oz (79.5 kg)   01/23/18 165 lb (74.8 kg)   10/13/17 158 lb 9.6 oz (71.9 kg)                    Reviewed and updated as needed this visit by clinical staff       Reviewed and updated as needed this visit by Provider         ROS:  Constitutional, HEENT, cardiovascular, pulmonary, gi and gu systems are negative, except as otherwise noted.    OBJECTIVE:     BP 99/61  Temp 98.9  F (37.2  C) (Tympanic)  Ht 5' 5\" (1.651 m)  Wt 175 lb 3.2 oz (79.5 kg)  LMP 03/23/2018 (Approximate)  Breastfeeding? No  BMI 29.15 kg/m2  Body mass index is 29.15 kg/(m^2).  GENERAL: healthy, alert and no distress    Patient appears to be in mild pain, normal gait noted. Lumbosacral spine area reveals normal spinal curvature and no local tenderness or mass.  Painful and reduced LS ROM noted. Pain over bilateral SI joints with palpation. Supine straight leg raise is negative at 90 degrees on both sides. DTR's, motor strength and sensation normal, including heel and toe gait.  Peripheral " pulses are palpable.            Diagnostic Test Results:  XR SACRUM AND COCCYX 2 VW 4/23/2018 3:37 PM      HISTORY: ; Sacroiliac joint pain         IMPRESSION: The sacrum and coccyx appear grossly normal. Sacroiliac  joints appear within normal limits with no periarticular sclerosis or  apparent erosion.     CON GUNTER MD    ASSESSMENT/PLAN:       1. Sacroiliac joint pain  Given her age and recurrent nature of pain, I did check an xray today.  Pain was greatest of SI joint and was normal.    Sacroiliac pain.      The patient was advised to apply ice for the first 2-3 days and then switch to heat  (avoid sleeping on heating pad).  Lifting mechanics discussed  Analgesics such as Tylenol and/or ibuprofen, see epic for orders.  Aerobic exercise program, this was strongly encouraged as one of the best ways to manage chronic back pain  Restart PT    Patient was instructed to f/u if symptoms worsen or fail to improve as anticipated.          - Beta HCG Qual, Urine - [facility] ([id number])  - XR Sacrum and Coccyx 2 Views    2. Acute right-sided low back pain without sciatica  Low Back Pain.    The patient was advised to apply heat intermittently (avoid sleeping on heating pad).  Lifting mechanics discussed  Analgesics such as Tylenol and/or ibuprofen, see epic for orders.  Aerobic exercise program, this was strongly encouraged as one of the best ways to manage chronic back pain  Restart Physical therapy     Patient was instructed to f/u if symptoms worsen or fail to improve as anticipated.        - cyclobenzaprine (FLEXERIL) 10 MG tablet; Take 0.5-1 tablets (5-10 mg) by mouth 3 times daily as needed for muscle spasms  Dispense: 30 tablet; Refill: 1  - [facility] REFERRAL    CONSULTATION/REFERRAL to physical therapy     Silvia Hu PA-C  [facility]

## 2018-04-23 NOTE — MR AVS SNAPSHOT
After Visit Summary   4/23/2018    Eliz Mckinney    MRN: [id number]           Patient Information     Date Of Birth          1990        Visit Information        Provider Department      4/23/2018 2:20 PM Silvia Hu PA-C [facility]        Today's Diagnoses     Sacroiliac joint pain    -  1    Acute right-sided low back pain without sciatica          Care Instructions      Back Care Tips    Caring for your back  These are things you can do to prevent a recurrence of acute back pain and to reduce symptoms from chronic back pain:    Maintain a healthy weight. If you are overweight, losing weight will help most types of back pain.    Exercise is an important part of recovery from most types of back pain. The muscles behind and in front of the spine support the back. This means strengthening both the back muscles and the abdominal muscles will provide better support for your spine.     Swimming and brisk walking are good overall exercises to improve your fitness level.    Practice safe lifting methods (below).    Practice good posture when sitting, standing and walking. Avoid prolonged sitting. This puts more stress on the lower back than standing or walking.    Wear quality shoes with sufficient arch support. Foot and ankle alignment can affect back symptoms. Women should avoid wearing high heels.    Therapeutic massage can help relax the back muscles without stretching them.    During the first 24 to 72 hours after an acute injury or flare-up of chronic back pain, apply an ice pack to the painful area for 20 minutes and then remove it for 20 minutes, over a period of 60 to 90 minutes, or several times a day. As a safety precaution, do not use a heating pad at bedtime. Sleeping on a heating pad can lead to skin burns or tissue damage.    You can alternate ice and heat therapies.  Medicines  Talk to your healthcare provider before using medicines, especially if you have  other medical problems or are taking other medicines.    You may use acetaminophen or ibuprofen to control pain, unless your healthcare provider prescribed other pain medicine. If you have chronic conditions like diabetes, liver or kidney disease, stomach ulcers, or gastrointestinal bleeding, or are taking blood thinners, talk with your healthcare provider before taking any medicines.    Be careful if you are given prescription pain medicines, narcotics, or medicine for muscle spasm. They can cause drowsiness, affect your coordination, reflexes, and judgment. Do not drive or operate heavy machinery while taking these types of medicines. Take prescription pain medicine only as prescribed by your healthcare provider.  Lumbar stretch  Here is a simple stretching exercise that will help relax muscle spasm and keep your back more limber. If exercise makes your back pain worse, don t do it.    Lie on your back with your knees bent and both feet on the ground.    Slowly raise your left knee to your chest as you flatten your lower back against the floor. Hold for 5 seconds.    Relax and repeat the exercise with your right knee.    Do 10 of these exercises for each leg.  Safe lifting method    Don t bend over at the waist to lift an object off the floor.  Instead, bend your knees and hips in a squat.     Keep your back and head upright    Hold the object close to your body, directly in front of you.    Straighten your legs to lift the object.     Lower the object to the floor in the reverse fashion.    If you must slide something across the floor, push it.  Posture tips  Sitting  Sit in chairs with straight backs or low-back support. Keep your knees lower than your hips, with your feet flat on the floor.  When driving, sit up straight. Adjust the seat forward so you are not leaning toward the steering wheel.  A small pillow or rolled towel behind your lower back may help if you are driving long distances.   Standing  When  standing for long periods, shift most of your weight to one leg at a time. Alternate legs every few minutes.   Sleeping  The best way to sleep is on your side with your knees bent. Put a low pillow under your head to support your neck in a neutral spine position. Avoid thick pillows that bend your neck to one side. Put a pillow between your legs to further relax your lower back. If you sleep on your back, put pillows under your knees to support your legs in a slightly flexed position. Use a firm mattress. If your mattress sags, replace it, or use a 1/2-inch plywood board under the mattress to add support.  Follow-up care  Follow up with your healthcare provider, or as advised.  If X-rays, a CT scan or an MRI scan were taken, they will be reviewed by a radiologist. You will be notified of any new findings that may affect your care.  Call 911  Call 911 if any of the following occur:    Trouble breathing    Confusion    Very drowsy    Fainting or loss of consciousness    Rapid or very slow heart rate    Loss of  bowel or bladder control  When to seek medical advice  Call your healthcare provider right away if any of the following occur:    Pain becomes worse or spreads to your arms or legs    Weakness or numbness in one or both arms or legs    Numbness in the groin area  Date Last Reviewed: 6/1/2016 2000-2017 The [employer]. [address] [place] [address]. All rights reserved. This information is not intended as a substitute for professional medical care. Always follow your healthcare professional's instructions.                Follow-ups after your visit        Additional Services     [facility] REFERRAL       **This order will print in the [facility] Scheduling Office**    Physical Therapy, Hand Therapy and Chiropractic Care are available through:    *[facility]  *[facility]  *[facility]    Call one number to schedule at any  of the above locations: [phone].    Your provider has referred you to: Physical Therapy at [facility] or [facility]    Indication/Reason for Referral: Low Back Pain  Onset of Illness: years  Therapy Orders: Evaluate and Treat  Special Programs: None  Special Request: None    Rosario Ponce      Additional Comments for the Therapist or Chiropractor:       Please be aware that coverage of these services is subject to the terms and limitations of your health insurance plan.  Call member services at your health plan with any benefit or coverage questions.      Please bring the following to your appointment:    *Your personal calendar for scheduling future appointments  *Comfortable clothing                  Who to contact     Normal or non-critical lab and imaging results will be communicated to you by Startapphart, letter or phone within 4 business days after the clinic has received the results. If you do not hear from us within 7 days, please contact the clinic through Startapphart or phone. If you have a critical or abnormal lab result, we will notify you by phone as soon as possible.  Submit refill requests through Draftster or call your pharmacy and they will forward the refill request to us. Please allow 3 business days for your refill to be completed.          If you need to speak with a  for additional information , please call: [phone]           Additional Information About Your Visit        Draftster Information     [employer] gives you secure access to your electronic health record. If you see a primary care provider, you can also send messages to your care team and make appointments. If you have questions, please call your primary care clinic.  If you do not have a primary care provider, please call [phone] and they will assist you.        Care EveryWhere ID     This is your Care EveryWhere ID. This could be used by other organizations to access your [place] medical records  [id number]        Your  "Vitals Were     Temperature Height Last Period Breastfeeding? BMI (Body Mass Index)       98.9  F (37.2  C) (Tympanic) 5' 5\" (1.651 m) 03/23/2018 (Approximate) No 29.15 kg/m2        Blood Pressure from Last 3 Encounters:   04/23/18 99/61   01/23/18 110/59   10/13/17 105/60    Weight from Last 3 Encounters:   04/23/18 175 lb 3.2 oz (79.5 kg)   01/23/18 165 lb (74.8 kg)   10/13/17 158 lb 9.6 oz (71.9 kg)              We Performed the Following     Beta HCG Qual, Urine - [facility] ([id number])     [facility] REFERRAL     XR Sacrum and Coccyx 2 Views          Today's Medication Changes          These changes are accurate as of 4/23/18  3:32 PM.  If you have any questions, ask your nurse or doctor.               Start taking these medicines.        Dose/Directions    cyclobenzaprine 10 MG tablet   Commonly known as:  FLEXERIL   Used for:  Acute right-sided low back pain without sciatica   Started by:  Silvia Hu PA-C        Dose:  5-10 mg   Take 0.5-1 tablets (5-10 mg) by mouth 3 times daily as needed for muscle spasms   Quantity:  30 tablet   Refills:  1            Where to get your medicines      These medications were sent to [facility] [id number] [place] [address]  AT [address] [facility] [address]     Phone:  [phone]     cyclobenzaprine 10 MG tablet                Primary Care Provider Office Phone # Fax #    Linda Arreola [phone] [address] [facility] [address]        Equal Access to Services     GREGG NEAL AH: Roberth Hung, wakvng reid, qajebta kaalmada chelo, theron schroeder. So [facility] [phone].    ATENCIÓN: Si habla español, tiene a rudd disposición servicios gratuitos de asistencia lingüística. Llame al [phone].    We comply with applicable federal civil rights laws and [place] laws. We do not discriminate on the " basis of race, color, national origin, age, disability, sex, sexual orientation, or gender identity.            Thank you!     Thank you for choosing [facility]  for your care. Our goal is always to provide you with excellent care. Hearing back from our patients is one way we can continue to improve our services. Please take a few minutes to complete the written survey that you may receive in the mail after your visit with us. Thank you!             Your Updated Medication List - Protect others around you: Learn how to safely use, store and throw away your medicines at www.disposemymeds.org.          This list is accurate as of 4/23/18  3:32 PM.  Always use your most recent med list.                   Brand Name Dispense Instructions for use Diagnosis    cyclobenzaprine 10 MG tablet    FLEXERIL    30 tablet    Take 0.5-1 tablets (5-10 mg) by mouth 3 times daily as needed for muscle spasms    Acute right-sided low back pain without sciatica       ESCITALOPRAM OXALATE PO      Take 10 mg by mouth daily        HYDROXYZINE HCL PO      Take 25 mg by mouth 3 times daily as needed for itching        levonorgestrel-ethinyl estradiol 0.15-30 MG-MCG per tablet    NORDETTE    90 tablet    Take 1 tablet by mouth daily    Encounter for surveillance of contraceptive pills

## 2018-04-23 NOTE — PATIENT INSTRUCTIONS
Back Care Tips    Caring for your back  These are things you can do to prevent a recurrence of acute back pain and to reduce symptoms from chronic back pain:    Maintain a healthy weight. If you are overweight, losing weight will help most types of back pain.    Exercise is an important part of recovery from most types of back pain. The muscles behind and in front of the spine support the back. This means strengthening both the back muscles and the abdominal muscles will provide better support for your spine.     Swimming and brisk walking are good overall exercises to improve your fitness level.    Practice safe lifting methods (below).    Practice good posture when sitting, standing and walking. Avoid prolonged sitting. This puts more stress on the lower back than standing or walking.    Wear quality shoes with sufficient arch support. Foot and ankle alignment can affect back symptoms. Women should avoid wearing high heels.    Therapeutic massage can help relax the back muscles without stretching them.    During the first 24 to 72 hours after an acute injury or flare-up of chronic back pain, apply an ice pack to the painful area for 20 minutes and then remove it for 20 minutes, over a period of 60 to 90 minutes, or several times a day. As a safety precaution, do not use a heating pad at bedtime. Sleeping on a heating pad can lead to skin burns or tissue damage.    You can alternate ice and heat therapies.  Medicines  Talk to your healthcare provider before using medicines, especially if you have other medical problems or are taking other medicines.    You may use acetaminophen or ibuprofen to control pain, unless your healthcare provider prescribed other pain medicine. If you have chronic conditions like diabetes, liver or kidney disease, stomach ulcers, or gastrointestinal bleeding, or are taking blood thinners, talk with your healthcare provider before taking any medicines.    Be careful if you are given  prescription pain medicines, narcotics, or medicine for muscle spasm. They can cause drowsiness, affect your coordination, reflexes, and judgment. Do not drive or operate heavy machinery while taking these types of medicines. Take prescription pain medicine only as prescribed by your healthcare provider.  Lumbar stretch  Here is a simple stretching exercise that will help relax muscle spasm and keep your back more limber. If exercise makes your back pain worse, don t do it.    Lie on your back with your knees bent and both feet on the ground.    Slowly raise your left knee to your chest as you flatten your lower back against the floor. Hold for 5 seconds.    Relax and repeat the exercise with your right knee.    Do 10 of these exercises for each leg.  Safe lifting method    Don t bend over at the waist to lift an object off the floor.  Instead, bend your knees and hips in a squat.     Keep your back and head upright    Hold the object close to your body, directly in front of you.    Straighten your legs to lift the object.     Lower the object to the floor in the reverse fashion.    If you must slide something across the floor, push it.  Posture tips  Sitting  Sit in chairs with straight backs or low-back support. Keep your knees lower than your hips, with your feet flat on the floor.  When driving, sit up straight. Adjust the seat forward so you are not leaning toward the steering wheel.  A small pillow or rolled towel behind your lower back may help if you are driving long distances.   Standing  When standing for long periods, shift most of your weight to one leg at a time. Alternate legs every few minutes.   Sleeping  The best way to sleep is on your side with your knees bent. Put a low pillow under your head to support your neck in a neutral spine position. Avoid thick pillows that bend your neck to one side. Put a pillow between your legs to further relax your lower back. If you sleep on your back, put pillows  under your knees to support your legs in a slightly flexed position. Use a firm mattress. If your mattress sags, replace it, or use a 1/2-inch plywood board under the mattress to add support.  Follow-up care  Follow up with your healthcare provider, or as advised.  If X-rays, a CT scan or an MRI scan were taken, they will be reviewed by a radiologist. You will be notified of any new findings that may affect your care.  Call 911  Call 911 if any of the following occur:    Trouble breathing    Confusion    Very drowsy    Fainting or loss of consciousness    Rapid or very slow heart rate    Loss of  bowel or bladder control  When to seek medical advice  Call your healthcare provider right away if any of the following occur:    Pain becomes worse or spreads to your arms or legs    Weakness or numbness in one or both arms or legs    Numbness in the groin area  Date Last Reviewed: 6/1/2016 2000-2017 [employer]. [address] [place] [address]. All rights reserved. This information is not intended as a substitute for professional medical care. Always follow your healthcare professional's instructions.

## 2018-07-30 ENCOUNTER — THERAPY VISIT (OUTPATIENT)
Dept: PHYSICAL THERAPY | Facility: CLINIC | Age: 28
End: 2018-07-30
Payer: COMMERCIAL

## 2018-07-30 DIAGNOSIS — M54.41 ACUTE LEFT-SIDED LOW BACK PAIN WITH BILATERAL SCIATICA: ICD-10-CM

## 2018-07-30 DIAGNOSIS — M54.42 ACUTE LEFT-SIDED LOW BACK PAIN WITH BILATERAL SCIATICA: ICD-10-CM

## 2018-07-30 DIAGNOSIS — M53.3 SACROILIAC JOINT PAIN: Primary | ICD-10-CM

## 2018-07-30 PROCEDURE — 97112 NEUROMUSCULAR REEDUCATION: CPT | Mod: GP | Performed by: PHYSICAL THERAPIST

## 2018-07-30 PROCEDURE — 97161 PT EVAL LOW COMPLEX 20 MIN: CPT | Mod: GP | Performed by: PHYSICAL THERAPIST

## 2018-07-30 NOTE — PROGRESS NOTES
"[facility] Initial Evaluation  Subjective:  Patient is a 27 year old female presenting with rehab back hpi. The history is provided by the patient. No  was used.   Eliz Mckinney is a 27 year old female with a lumbar condition.  Condition occurred with:  Lifting and bending.  Condition occurred: for unknown reasons.  This is a recurrent condition  Last visited MD on 4/23/18 for this problem but started to get better so she didn't schedule an appointment right away. This pain has been occurring for the last several years but seems to be getting getting worse..      Radiates to:  Knee left and knee right (Depends on the episode of flare-ups).  Pain is described as cramping and other (\"feels like her back is going to give out\", dull throbbing)  and reported as 1/10 (Up to 8/10).  Associated symptoms:  Loss of strength, loss of motion/stiffness, loss of motion and fatigue. Pain is the same all the time.  Symptoms are exacerbated by sitting, lifting, bending, walking and standing and relieved by heat and rest.  Since onset symptoms are gradually worsening.  Special tests:  X-ray.      General health as reported by patient is good.  Pertinent medical history includes:  Depression.  Medical allergies: no.    Current medications:  Anti-depressants, anti-inflammatory, muscle relaxants and other (oral birth control).  Current occupation is CMA.  Patient is working in normal job without restrictions.  Primary job tasks include:  Lifting, prolonged standing, prolonged sitting and other (Computer work).    Barriers include:  None as reported by the patient.    Red flags:  None as reported by the patient.                        Objective:  System         Lumbar/SI Evaluation  ROM:    AROM Lumbar:   Flexion:          To toes  Ext:                    -50%   Side Bend:        Left:  To knee    Right:  To knee  Rotation:           Left:     Right:   Side Glide:        Left:     Right:       "             Neural Tension/Mobility:      Left side:SLR  negative.     Right side:   SLR  negative.   Lumbar Palpation:    Tenderness present at Left:    Erector Spinae and PSIS  Tenderness not present at Left:    Quadratus Lumborum or Gluteus Medius    Tenderness not present at Right:  Quadratus Lumborum; Erector Spinae; PSIS or Gluteus Medius      Spinal Segmental Conclusions: Normal mobility in low lumbar spine and sacrum          SI joint/Sacrum:      Provocation:  P4 positive on L                                          Hip Evaluation      Hip Special Testing:      Left hip negative for the following special tests:  Angela                   General     ROS    Assessment/Plan:    Patient is a 27 year old female with lumbar and sacral complaints.    Patient has the following significant findings with corresponding treatment plan.                Diagnosis 1:  Low back/SI pain  Pain -  hot/cold therapy, self management, education and home program  Decreased ROM/flexibility - manual therapy, therapeutic exercise and home program  Decreased strength - therapeutic exercise and therapeutic activities  Impaired muscle performance - neuro re-education and home program  Decreased function - therapeutic activities and home program    Therapy Evaluation Codes:   1) History comprised of:   Personal factors that impact the plan of care:      Anxiety, Past/current experiences and Time since onset of symptoms.    Comorbidity factors that impact the plan of care are:      Depression.     Medications impacting care: Muscle relaxant.  2) Examination of Body Systems comprised of:   Body structures and functions that impact the plan of care:      Lumbar spine and Sacral illiac joint.   Activity limitations that impact the plan of care are:      Bending, Lifting, Running, Sitting, Squatting/kneeling, Stairs, Standing, Walking and Laying down.  3) Clinical presentation characteristics  are:   Stable/Uncomplicated.  4) Decision-Making    Low complexity using standardized patient assessment instrument and/or measureable assessment of functional outcome.  Cumulative Therapy Evaluation is: Low complexity.    Previous and current functional limitations:  (See Goal Flow Sheet for this information)    Short term and Long term goals: (See Goal Flow Sheet for this information)     Communication ability:  Patient appears to be able to clearly communicate and understand verbal and written communication and follow directions correctly.  Treatment Explanation - The following has been discussed with the patient:   RX ordered/plan of care  Anticipated outcomes  Possible risks and side effects  This patient would benefit from PT intervention to resume normal activities.   Rehab potential is good.    Frequency:  1 X week, once daily  Duration:  for 6-8 weeks  Discharge Plan:  Achieve all LTG.  Independent in home treatment program.  Reach maximal therapeutic benefit.    Please refer to the daily flowsheet for treatment today, total treatment time and time spent performing 1:1 timed codes.

## 2018-08-07 ENCOUNTER — THERAPY VISIT (OUTPATIENT)
Dept: PHYSICAL THERAPY | Facility: CLINIC | Age: 28
End: 2018-08-07
Payer: COMMERCIAL

## 2018-08-07 DIAGNOSIS — M54.41 ACUTE LEFT-SIDED LOW BACK PAIN WITH BILATERAL SCIATICA: ICD-10-CM

## 2018-08-07 DIAGNOSIS — M54.42 ACUTE LEFT-SIDED LOW BACK PAIN WITH BILATERAL SCIATICA: ICD-10-CM

## 2018-08-07 DIAGNOSIS — M53.3 SACROILIAC JOINT PAIN: ICD-10-CM

## 2018-08-07 PROCEDURE — 97110 THERAPEUTIC EXERCISES: CPT | Mod: GP | Performed by: PHYSICAL THERAPY ASSISTANT

## 2018-08-07 PROCEDURE — 97112 NEUROMUSCULAR REEDUCATION: CPT | Mod: GP | Performed by: PHYSICAL THERAPY ASSISTANT

## 2018-08-14 ENCOUNTER — THERAPY VISIT (OUTPATIENT)
Dept: PHYSICAL THERAPY | Facility: CLINIC | Age: 28
End: 2018-08-14
Payer: COMMERCIAL

## 2018-08-14 DIAGNOSIS — M53.3 SACROILIAC JOINT PAIN: ICD-10-CM

## 2018-08-14 DIAGNOSIS — M54.42 ACUTE LEFT-SIDED LOW BACK PAIN WITH BILATERAL SCIATICA: ICD-10-CM

## 2018-08-14 DIAGNOSIS — M54.41 ACUTE LEFT-SIDED LOW BACK PAIN WITH BILATERAL SCIATICA: ICD-10-CM

## 2018-08-14 PROCEDURE — 97110 THERAPEUTIC EXERCISES: CPT | Mod: GP | Performed by: PHYSICAL THERAPY ASSISTANT

## 2018-08-14 PROCEDURE — 97112 NEUROMUSCULAR REEDUCATION: CPT | Mod: GP | Performed by: PHYSICAL THERAPY ASSISTANT

## 2018-08-29 ENCOUNTER — THERAPY VISIT (OUTPATIENT)
Dept: PHYSICAL THERAPY | Facility: CLINIC | Age: 28
End: 2018-08-29
Payer: COMMERCIAL

## 2018-08-29 DIAGNOSIS — M54.41 ACUTE LEFT-SIDED LOW BACK PAIN WITH BILATERAL SCIATICA: ICD-10-CM

## 2018-08-29 DIAGNOSIS — M54.42 ACUTE LEFT-SIDED LOW BACK PAIN WITH BILATERAL SCIATICA: ICD-10-CM

## 2018-08-29 DIAGNOSIS — M53.3 SACROILIAC JOINT PAIN: ICD-10-CM

## 2018-08-29 PROCEDURE — 97112 NEUROMUSCULAR REEDUCATION: CPT | Mod: GP | Performed by: PHYSICAL THERAPIST

## 2018-08-29 PROCEDURE — 97110 THERAPEUTIC EXERCISES: CPT | Mod: GP | Performed by: PHYSICAL THERAPIST

## 2018-09-24 DIAGNOSIS — Z30.41 ENCOUNTER FOR SURVEILLANCE OF CONTRACEPTIVE PILLS: ICD-10-CM

## 2018-09-24 NOTE — TELEPHONE ENCOUNTER
"Requested Prescriptions   Pending Prescriptions Disp Refills     levonorgestrel-ethinyl estradiol (NORDETTE) 0.15-30 MG-MCG per tablet 90 tablet 3    Last Written Prescription Date:  10/13/2017 #90 x 3  Last filled 07/02/2018  Last office visit: 4/23/2018 CHARO Hu   Future Office Visit:  None   Sig: Take 1 tablet by mouth daily    Contraceptives Protocol Passed    9/24/2018  3:18 PM       Passed - Patient is not a current smoker if age is 35 or older       Passed - Recent (12 mo) or future (30 days) visit within the authorizing provider's specialty    Patient had office visit in the last 12 months or has a visit in the next 30 days with authorizing provider or within the authorizing provider's specialty.  See \"Patient Info\" tab in inbasket, or \"Choose Columns\" in Meds & Orders section of the refill encounter.           Passed - No active pregnancy on record       Passed - No positive pregnancy test in past 12 months          "

## 2018-09-25 DIAGNOSIS — Z30.41 ENCOUNTER FOR SURVEILLANCE OF CONTRACEPTIVE PILLS: ICD-10-CM

## 2018-09-25 RX ORDER — LEVONORGESTREL AND ETHINYL ESTRADIOL 0.15-0.03
1 KIT ORAL DAILY
Qty: 30 TABLET | Refills: 0 | Status: SHIPPED | OUTPATIENT
Start: 2018-09-25 | End: 2018-10-31

## 2018-09-25 RX ORDER — LEVONORGESTREL AND ETHINYL ESTRADIOL 0.15-0.03
KIT ORAL
Qty: 84 TABLET | Refills: 0 | OUTPATIENT
Start: 2018-09-25

## 2018-09-25 NOTE — TELEPHONE ENCOUNTER
Received an additional e-refill request from [facility] for Altavera    Requesting a 90 day supply    Approved 09/25/2018 #30 x 0

## 2018-10-22 ENCOUNTER — TELEPHONE (OUTPATIENT)
Dept: FAMILY MEDICINE | Facility: CLINIC | Age: 28
End: 2018-10-22

## 2018-10-22 DIAGNOSIS — Z30.41 ENCOUNTER FOR SURVEILLANCE OF CONTRACEPTIVE PILLS: ICD-10-CM

## 2018-10-22 RX ORDER — LEVONORGESTREL AND ETHINYL ESTRADIOL 0.15-0.03
KIT ORAL
Qty: 28 TABLET | Refills: 0
Start: 2018-10-22

## 2018-10-22 NOTE — TELEPHONE ENCOUNTER
"Requested Prescriptions   Pending Prescriptions Disp Refills     ALTAVERA 0.15-30 MG-MCG per tablet [Pharmacy Med Name: ALTAVERA TABLETS 28S] 28 tablet 0    Last Written Prescription Date:  09/25/2018 #30 x 0  Last filled 09/25/2018  Last office visit: 4/23/2018 CHARO Hu   Future Office Visit: None   Sig: TAKE 1 TABLET BY MOUTH DAILY. FOLLOW-UP APPOINTMENT FOR THIS MEDICATION    Contraceptives Protocol Passed    10/22/2018  4:11 AM       Passed - Patient is not a current smoker if age is 35 or older       Passed - Recent (12 mo) or future (30 days) visit within the authorizing provider's specialty    Patient had office visit in the last 12 months or has a visit in the next 30 days with authorizing provider or within the authorizing provider's specialty.  See \"Patient Info\" tab in inbasket, or \"Choose Columns\" in Meds & Orders section of the refill encounter.             Passed - No active pregnancy on record       Passed - No positive pregnancy test in past 12 months          "

## 2018-10-22 NOTE — TELEPHONE ENCOUNTER
Routing refill request to provider for review/approval because:  Constance given x1 and patient did not follow up, please advise  Farheen LEO/TERRELL

## 2018-10-23 NOTE — TELEPHONE ENCOUNTER
Tried calling pt, no answer, left msg to call back and schedule appt.     Ros Jensen, [place]

## 2018-10-23 NOTE — TELEPHONE ENCOUNTER
Please call patient, she has already been given a caryn refill.  She is due for a physical, please assist her in scheduling.  Let me know if she needs one last caryn refill  Silvia Hu PA-C

## 2018-10-30 ASSESSMENT — ENCOUNTER SYMPTOMS
NERVOUS/ANXIOUS: 1
ARTHRALGIAS: 0
PALPITATIONS: 0
NAUSEA: 0
FREQUENCY: 0
WEAKNESS: 0
DYSURIA: 0
FEVER: 0
DIARRHEA: 0
EYE PAIN: 0
DIZZINESS: 0
PARESTHESIAS: 0
HEADACHES: 0
HEARTBURN: 0
HEMATOCHEZIA: 0
BREAST MASS: 0
SORE THROAT: 0
ABDOMINAL PAIN: 0
COUGH: 0
MYALGIAS: 0
HEMATURIA: 0
CHILLS: 0
SHORTNESS OF BREATH: 0
CONSTIPATION: 0
JOINT SWELLING: 0

## 2018-10-31 ENCOUNTER — OFFICE VISIT (OUTPATIENT)
Dept: FAMILY MEDICINE | Facility: CLINIC | Age: 28
End: 2018-10-31
Payer: COMMERCIAL

## 2018-10-31 VITALS
TEMPERATURE: 97.9 F | DIASTOLIC BLOOD PRESSURE: 60 MMHG | HEART RATE: 68 BPM | SYSTOLIC BLOOD PRESSURE: 110 MMHG | RESPIRATION RATE: 20 BRPM | BODY MASS INDEX: 28.71 KG/M2 | HEIGHT: 65 IN | WEIGHT: 172.3 LBS

## 2018-10-31 DIAGNOSIS — Z23 NEED FOR PROPHYLACTIC VACCINATION AND INOCULATION AGAINST INFLUENZA: ICD-10-CM

## 2018-10-31 DIAGNOSIS — Z30.41 ENCOUNTER FOR SURVEILLANCE OF CONTRACEPTIVE PILLS: ICD-10-CM

## 2018-10-31 DIAGNOSIS — F43.23 ADJUSTMENT DISORDER WITH MIXED ANXIETY AND DEPRESSED MOOD: Primary | ICD-10-CM

## 2018-10-31 DIAGNOSIS — Z00.00 WELL ADULT EXAM: ICD-10-CM

## 2018-10-31 PROCEDURE — 90471 IMMUNIZATION ADMIN: CPT | Performed by: NURSE PRACTITIONER

## 2018-10-31 PROCEDURE — 99395 PREV VISIT EST AGE 18-39: CPT | Mod: 25 | Performed by: NURSE PRACTITIONER

## 2018-10-31 PROCEDURE — 90686 IIV4 VACC NO PRSV 0.5 ML IM: CPT | Performed by: NURSE PRACTITIONER

## 2018-10-31 RX ORDER — LEVONORGESTREL AND ETHINYL ESTRADIOL 0.15-0.03
1 KIT ORAL DAILY
Qty: 90 TABLET | Refills: 3 | Status: SHIPPED | OUTPATIENT
Start: 2018-10-31 | End: 2019-05-09

## 2018-10-31 ASSESSMENT — PATIENT HEALTH QUESTIONNAIRE - PHQ9
SUM OF ALL RESPONSES TO PHQ QUESTIONS 1-9: 1
5. POOR APPETITE OR OVEREATING: NOT AT ALL

## 2018-10-31 ASSESSMENT — ENCOUNTER SYMPTOMS
POLYPHAGIA: 0
ACTIVITY CHANGE: 0
MYALGIAS: 0
POLYDIPSIA: 0
HEADACHES: 0
PALPITATIONS: 0
WHEEZING: 0
NAUSEA: 0
ARTHRALGIAS: 0
ROS SKIN COMMENTS: SELF BREAST EXAM WITHOUT AREA OF CONCERN.
SORE THROAT: 0
DYSURIA: 0
FATIGUE: 0
CHILLS: 0
LIGHT-HEADEDNESS: 0
DYSPHORIC MOOD: 0
NERVOUS/ANXIOUS: 1
FEVER: 0
CONSTIPATION: 0
DIFFICULTY URINATING: 0
COUGH: 0
WEAKNESS: 0
FREQUENCY: 0
VOMITING: 0
DIARRHEA: 0
SHORTNESS OF BREATH: 0
JOINT SWELLING: 0
RHINORRHEA: 0
SLEEP DISTURBANCE: 0
EYE PAIN: 0
CHEST TIGHTNESS: 0
HEMATURIA: 0
ABDOMINAL DISTENTION: 0
NUMBNESS: 0
ABDOMINAL PAIN: 0
DIZZINESS: 0

## 2018-10-31 ASSESSMENT — ANXIETY QUESTIONNAIRES
5. BEING SO RESTLESS THAT IT IS HARD TO SIT STILL: NOT AT ALL
3. WORRYING TOO MUCH ABOUT DIFFERENT THINGS: SEVERAL DAYS
2. NOT BEING ABLE TO STOP OR CONTROL WORRYING: NOT AT ALL
GAD7 TOTAL SCORE: 2
7. FEELING AFRAID AS IF SOMETHING AWFUL MIGHT HAPPEN: NOT AT ALL
6. BECOMING EASILY ANNOYED OR IRRITABLE: NOT AT ALL
1. FEELING NERVOUS, ANXIOUS, OR ON EDGE: SEVERAL DAYS

## 2018-10-31 ASSESSMENT — PAIN SCALES - GENERAL: PAINLEVEL: NO PAIN (0)

## 2018-10-31 NOTE — PROGRESS NOTES
SUBJECTIVE:   CC: Eliz Mckinney is an 28 year old woman who presents for preventive health visit.     Chief Complaint   Patient presents with     Physical     pt is not fasting       Answers for HPI/ROS submitted by the patient on 10/30/2018   Annual Exam:  Frequency of exercise:: 1 day/week  Getting at least 3 servings of Calcium per day:: NO  Diet:: Carbohydrate counting  Taking medications regularly:: Yes  Medication side effects:: Not applicable  Bi-annual eye exam:: Yes  Dental care twice a year:: NO  Sleep apnea or symptoms of sleep apnea:: None  abdominal pain: No  Blood in stool: No  Blood in urine: No  chest pain: No  chills: No  congestion: No  constipation: No  cough: No  diarrhea: No  dizziness: No  ear pain: No  eye pain: No  nervous/anxious: Yes  fever: No  frequency: No  genital sores: No  headaches: No  hearing loss: No  heartburn: No  arthralgias: No  joint swelling: No  peripheral edema: No  mood changes: No  myalgias: No  nausea: No  dysuria: No  palpitations: No  Skin sensation changes: No  sore throat: No  urgency: No  rash: No  shortness of breath: No  visual disturbance: No  weakness: No  pelvic pain: No  vaginal bleeding: No  vaginal discharge: No  tenderness: No  breast mass: No  breast discharge: No  Additional concerns today:: No  PHQ-2 Score: 1  Duration of exercise:: 15-30 minutes    Today's PHQ-2 Score:   PHQ-2 ( 1999 [employer]) 10/30/2018 10/6/2017   Q1: Little interest or pleasure in doing things 0 0   Q2: Feeling down, depressed or hopeless 1 0   PHQ-2 Score 1 0   Q1: Little interest or pleasure in doing things Not at all -   Q2: Feeling down, depressed or hopeless Several days -   PHQ-2 Score 1 -       Abuse: Current or Past(Physical, Sexual or Emotional)- No  Do you feel safe in your environment - Yes    Social History   Substance Use Topics     Smoking status: Never Smoker     Smokeless tobacco: Never Used     Alcohol use No     If you drink alcohol do you typically have >3  drinks per day or >7 drinks per week? No                     Reviewed orders with patient.  Reviewed health maintenance and updated orders accordingly - Yes  Current Outpatient Prescriptions   Medication Sig Dispense Refill     cyclobenzaprine (FLEXERIL) 10 MG tablet Take 0.5-1 tablets (5-10 mg) by mouth 3 times daily as needed for muscle spasms 30 tablet 1     ESCITALOPRAM OXALATE PO Take 10 mg by mouth daily       HYDROXYZINE HCL PO Take 25 mg by mouth 3 times daily as needed for itching       levonorgestrel-ethinyl estradiol (NORDETTE) 0.15-30 MG-MCG per tablet Take 1 tablet by mouth daily 90 tablet 3     [DISCONTINUED] levonorgestrel-ethinyl estradiol (NORDETTE) 0.15-30 MG-MCG per tablet Take 1 tablet by mouth daily (Needs follow-up appointment for this medication) 30 tablet 0     Allergies   Allergen Reactions     Nkda [No Known Drug Allergies]        Mammogram not appropriate for this patient based on age.    Pertinent mammograms are reviewed under the imaging tab.  History of abnormal Pap smear: NO - age 21-29 PAP every 3 years recommended  PAP / HPV 10/13/2017 1/13/2014 3/11/2011   PAP NIL NIL NIL     Reviewed and updated as needed this visit by clinical staff  Tobacco  Allergies  Med Hx  Surg Hx  Fam Hx  Soc Hx        Reviewed and updated as needed this visit by Provider          Here today for physical. No current concerns. Needs to have refills of birth control. No problems with current pill. Does forget to take it 1 time per week. Will use condoms for backup if misses pills.     Goes to [facility] in [place]. Goes usually once per year.     Last Pap: 10/17 normal, never an abnormal.   Last mammogram: Never. No family history of breast cancer   Last BMD: Never.   Last Colonoscopy: Never. No family history of colon cancer.   Last eye exam: Annually   Last dental exam: every 6 mo  Last tetanus vaccine:  Last influenza vaccine: Plans to get here today.   Last shingles vaccine: 2010  Last pneumonia  "vaccine: Never   Hep C screen (born 3682-1815): N/A  HIV screen: Did at work   AAA screen (age 65-78 with smoking hx): N/A  IVD (HTN, Hyperlipid, Smoking): N/A  Lung CA screening (55-80, 30 pk smoking hx): N/A    ROS:  Review of Systems   Constitutional: Negative for activity change, chills, fatigue and fever.   HENT: Negative for congestion, ear pain, hearing loss, rhinorrhea and sore throat.    Eyes: Negative for pain and visual disturbance.   Respiratory: Negative for cough, chest tightness, shortness of breath and wheezing.    Cardiovascular: Negative for chest pain, palpitations and leg swelling.   Gastrointestinal: Negative for abdominal distention, abdominal pain, constipation, diarrhea, nausea and vomiting.   Endocrine: Negative for cold intolerance, heat intolerance, polydipsia, polyphagia and polyuria.   Genitourinary: Negative for difficulty urinating, dysuria, enuresis, frequency, genital sores, hematuria, pelvic pain, urgency, vaginal bleeding and vaginal discharge.   Musculoskeletal: Negative for arthralgias, joint swelling and myalgias.   Skin: Negative for rash.        Self breast exam without area of concern.    Neurological: Negative for dizziness, weakness, light-headedness, numbness and headaches.   Psychiatric/Behavioral: Negative for dysphoric mood and sleep disturbance. The patient is nervous/anxious.          OBJECTIVE:   /60 (BP Location: Right arm, Patient Position: Sitting, Cuff Size: Adult Large)  Pulse 68  Temp 97.9  F (36.6  C) (Tympanic)  Resp 20  Ht 5' 5\" (1.651 m)  Wt 172 lb 4.8 oz (78.2 kg)  LMP 10/30/2018  BMI 28.67 kg/m2  EXAM:  Physical Exam   Constitutional: She appears well-developed and well-nourished.   HENT:   Head: Normocephalic and atraumatic.   Right Ear: Tympanic membrane and external ear normal. No middle ear effusion.   Left Ear: Tympanic membrane and external ear normal.  No middle ear effusion.   Nose: No mucosal edema.   Mouth/Throat: Uvula is midline, " "oropharynx is clear and moist and mucous membranes are normal.   Eyes: Pupils are equal, round, and reactive to light.   Neck: Normal range of motion. Carotid bruit is not present. No thyromegaly present.   Cardiovascular: Normal rate, regular rhythm and normal heart sounds.    Pulses:       Femoral pulses are 2+ on the right side, and 2+ on the left side.  Pulmonary/Chest: Effort normal and breath sounds normal.   Abdominal: Soft. Normal appearance and bowel sounds are normal. There is no tenderness.   Musculoskeletal: Normal range of motion.   Neurological: She is alert.   Skin: Skin is warm and dry. No rash noted.   Psychiatric: She has a normal mood and affect. Her behavior is normal.       ASSESSMENT/PLAN:   1. Adjustment disorder with mixed anxiety and depressed mood  Continue follow with mental health  - DEPRESSION ACTION PLAN (DAP)    2. Well adult exam  Screening guidelines reviewed.  - Lipid panel reflex to direct LDL Fasting; Future  - Basic metabolic panel; Future  Will return for fasting labs    3. Encounter for surveillance of contraceptive pills  Doing well on current.  Refill sent for 1 year.  - levonorgestrel-ethinyl estradiol (NORDETTE) 0.15-30 MG-MCG per tablet; Take 1 tablet by mouth daily  Dispense: 90 tablet; Refill: 3    4. Need for prophylactic vaccination and inoculation against influenza  Plan to administer in clinic today.  - FLU VACCINE, SPLIT VIRUS, IM (QUADRIVALENT) [68434]- >3 YRS  - Vaccine Administration, Initial [14563]    COUNSELING:   Reviewed preventive health counseling, as reflected in patient instructions       Regular exercise       Healthy diet/nutrition       Immunizations        Vaccinated for: Influenza         Contraception       Colon cancer screening    BP Readings from Last 1 Encounters:   10/31/18 110/60     Estimated body mass index is 28.67 kg/(m^2) as calculated from the following:    Height as of this encounter: 5' 5\" (1.651 m).    Weight as of this encounter: " 172 lb 4.8 oz (78.2 kg).           reports that she has never smoked. She has never used smokeless tobacco.      Counseling Resources:  ATP IV Guidelines  Pooled Cohorts Equation Calculator  Breast Cancer Risk Calculator  FRAX Risk Assessment  ICSI Preventive Guidelines  Dietary Guidelines for Americans, 2010  USDA's MyPlate  ASA Prophylaxis  Lung CA Screening    NICOLE Costa [facility]

## 2018-10-31 NOTE — PROGRESS NOTES

## 2018-10-31 NOTE — LETTER
[facility]  [address] [address]  [phone]        November 7, 2019    Eliz Mckinney  [address]  [place] [address]              Dear Eliz Mckinney    This is to remind you that your fasting lab is due.    You may call our office at [phone] to schedule an appointment.    Please disregard this notice if you have already had your labs drawn or made an appointment.        Sincerely,        Analilia Michel RN, CNP

## 2018-10-31 NOTE — LETTER
My Depression Action Plan  Name: Eliz Mckinney   Date of Birth 1990  Date: 10/31/2018    My doctor: Linda Arreola   My clinic: [address] [id number] [phone]          GREEN    ZONE   Good Control    What it looks like:     Things are going generally well. You have normal up s and down s. You may even feel depressed from time to time, but bad moods usually last less than a day.   What you need to do:  1. Continue to care for yourself (see self care plan)  2. Check your depression survival kit and update it as needed  3. Follow your physician s recommendations including any medication.  4. Do not stop taking medication unless you consult with your physician first.           YELLOW         ZONE Getting Worse    What it looks like:     Depression is starting to interfere with your life.     It may be hard to get out of bed; you may be starting to isolate yourself from others.    Symptoms of depression are starting to last most all day and this has happened for several days.     You may have suicidal thoughts but they are not constant.   What you need to do:     1. Call your care team, your response to treatment will improve if you keep your care team informed of your progress. Yellow periods are signs an adjustment may need to be made.     2. Continue your self-care, even if you have to fake it!    3. Talk to someone in your support network    4. Open up your depression survival kit           RED    ZONE Medical Alert - Get Help    What it looks like:     Depression is seriously interfering with your life.     You may experience these or other symptoms: You can t get out of bed most days, can t work or engage in other necessary activities, you have trouble taking care of basic hygiene, or basic responsibilities, thoughts of suicide or death that will not go away, self-injurious behavior.     What you need to do:  1. Call your care team and  request a same-day appointment. If they are not available (weekends or after hours) call your local crisis line, emergency room or 911.            Depression Self Care Plan / Survival Kit    Self-Care for Depression  Here s the deal. Your body and mind are really not as separate as most people think.  What you do and think affects how you feel and how you feel influences what you do and think. This means if you do things that people who feel good do, it will help you feel better.  Sometimes this is all it takes.  There is also a place for medication and therapy depending on how severe your depression is, so be sure to consult with your medical provider and/ or Behavioral Health Consultant if your symptoms are worsening or not improving.     In order to better manage my stress, I will:    Exercise  Get some form of exercise, every day. This will help reduce pain and release endorphins, the  feel good  chemicals in your brain. This is almost as good as taking antidepressants!  This is not the same as joining a gym and then never going! (they count on that by the way ) It can be as simple as just going for a walk or doing some gardening, anything that will get you moving.      Hygiene   Maintain good hygiene (Get out of bed in the morning, Make your bed, Brush your teeth, Take a shower, and Get dressed like you were going to work, even if you are unemployed).  If your clothes don't fit try to get ones that do.    Diet  I will strive to eat foods that are good for me, drink plenty of water, and avoid excessive sugar, caffeine, alcohol, and other mood-altering substances.  Some foods that are helpful in depression are: complex carbohydrates, B vitamins, flaxseed, fish or fish oil, fresh fruits and vegetables.    Psychotherapy  I agree to participate in Individual Therapy (if recommended).    Medication  If prescribed medications, I agree to take them.  Missing doses can result in serious side effects.  I understand that  drinking alcohol, or other illicit drug use, may cause potential side effects.  I will not stop my medication abruptly without first discussing it with my provider.    Staying Connected With Others  I will stay in touch with my friends, family members, and my primary care provider/team.    Use your imagination  Be creative.  We all have a creative side; it doesn t matter if it s oil painting, sand castles, or mud pies! This will also kick up the endorphins.    Witness Beauty  (AKA stop and smell the roses) Take a look outside, even in mid-winter. Notice colors, textures. Watch the squirrels and birds.     Service to others  Be of service to others.  There is always someone else in need.  By helping others we can  get out of ourselves  and remember the really important things.  This also provides opportunities for practicing all the other parts of the program.    Humor  Laugh and be silly!  Adjust your TV habits for less news and crime-drama and more comedy.    Control your stress  Try breathing deep, massage therapy, biofeedback, and meditation. Find time to relax each day.     My support system    Clinic Contact:  Phone number:    Contact 1:  Phone number:    Contact 2:  Phone number:    Sabianism/:  Phone number:    Therapist:  Phone number:    Local crisis center:    Phone number:    Other community support:  Phone number:

## 2018-10-31 NOTE — MR AVS SNAPSHOT
After Visit Summary   10/31/2018    Eliz Mckinney    MRN: [id number]           Patient Information     Date Of Birth          1990        Visit Information        Provider Department      10/31/2018 1:40 PM Analilia Michel APRN [facility]        Today's Diagnoses     Adjustment disorder with mixed anxiety and depressed mood    -  1    Well adult exam        Encounter for surveillance of contraceptive pills          Care Instructions      Preventive Health Recommendations  Female Ages 26 - 39  Yearly exam:   See your health care provider every year in order to    Review health changes.     Discuss preventive care.      Review your medicines if you your doctor has prescribed any.    Until age 30: Get a Pap test every three years (more often if you have had an abnormal result).    After age 30: Talk to your doctor about whether you should have a Pap test every 3 years or have a Pap test with HPV screening every 5 years.   You do not need a Pap test if your uterus was removed (hysterectomy) and you have not had cancer.  You should be tested each year for STDs (sexually transmitted diseases), if you're at risk.   Talk to your provider about how often to have your cholesterol checked.  If you are at risk for diabetes, you should have a diabetes test (fasting glucose).  Shots: Get a flu shot each year. Get a tetanus shot every 10 years.   Nutrition:     Eat at least 5 servings of fruits and vegetables each day.    Eat whole-grain bread, whole-wheat pasta and brown rice instead of white grains and rice.    Get adequate Calcium and Vitamin D.     Lifestyle    Exercise at least 150 minutes a week (30 minutes a day, 5 days of the week). This will help you control your weight and prevent disease.    Limit alcohol to one drink per day.    No smoking.     Wear sunscreen to prevent skin cancer.    See your dentist every six months for an exam and cleaning.            Follow-ups  "after your visit        Follow-up notes from your care team     Return in about 1 month (around 11/30/2018) for A Fasting Lab Only Visit.      Future tests that were ordered for you today     Open Future Orders        Priority Expected Expires Ordered    Lipid panel reflex to direct LDL Fasting Routine  10/31/2019 10/31/2018    Basic metabolic panel Routine  10/31/2019 10/31/2018            Who to contact     Normal or non-critical lab and imaging results will be communicated to you by 1Ring[employer], letter or phone within 4 business days after the clinic has received the results. If you do not hear from us within 7 days, please contact the clinic through [employer] or phone. If you have a critical or abnormal lab result, we will notify you by phone as soon as possible.  Submit refill requests through oLyfe or call your pharmacy and they will forward the refill request to us. Please allow 3 business days for your refill to be completed.          If you need to speak with a  for additional information , please call: [phone]           Additional Information About Your Visit        oLyfe Information     [employer]fe gives you secure access to your electronic health record. If you see a primary care provider, you can also send messages to your care team and make appointments. If you have questions, please call your primary care clinic.  If you do not have a primary care provider, please call [phone] and they will assist you.        Care EveryWhere ID     This is your Care EveryWhere ID. This could be used by other organizations to access your [facility] medical records  [id number]        Your Vitals Were     Pulse Temperature Respirations Height Last Period BMI (Body Mass Index)    68 97.9  F (36.6  C) (Tympanic) 20 5' 5\" (1.651 m) 10/30/2018 28.67 kg/m2       Blood Pressure from Last 3 Encounters:   10/31/18 110/60   04/23/18 99/61   01/23/18 110/59    Weight from Last 3 Encounters:   10/31/18 172 lb " 4.8 oz (78.2 kg)   04/23/18 175 lb 3.2 oz (79.5 kg)   01/23/18 165 lb (74.8 kg)              We Performed the Following     DEPRESSION ACTION PLAN (DAP)          Today's Medication Changes          These changes are accurate as of 10/31/18  2:13 PM.  If you have any questions, ask your nurse or doctor.               These medicines have changed or have updated prescriptions.        Dose/Directions    levonorgestrel-ethinyl estradiol 0.15-30 MG-MCG per tablet   Commonly known as:  MIRANDAETTE   This may have changed:  additional instructions   Used for:  Encounter for surveillance of contraceptive pills   Changed by:  Analilia Michel APRN CNP        Dose:  1 tablet   Take 1 tablet by mouth daily   Quantity:  90 tablet   Refills:  3            Where to get your medicines      These medications were sent to [facility] [address] [place] [address]  AT [address] , [facility] [address]     Phone:  [phone]     levonorgestrel-ethinyl estradiol 0.15-30 MG-MCG per tablet                Primary Care Provider Office Phone # Fax #    Linda Arreola [phone] [address] [facility] [address]        Equal Access to Services     JOSUE NEAL AH: Hadii marlon ku hadasho Soomaali, waaxda luqadaha, qaybta kaalmada adeegyada, waxay shahanain hayuli schroeder. So [facility] [phone].    ATENCIÓN: Si habla español, tiene a rudd disposición servicios gratuitos de asistencia lingüística. Llame al [phone].    We comply with applicable federal civil rights laws and [place] laws. We do not discriminate on the basis of race, color, national origin, age, disability, sex, sexual orientation, or gender identity.            Thank you!     Thank you for choosing [facility]  for your care. Our goal is always to provide you with excellent care. Hearing back from our patients is one way we can continue to improve our services.  Please take a few minutes to complete the written survey that you may receive in the mail after your visit with us. Thank you!             Your Updated Medication List - Protect others around you: Learn how to safely use, store and throw away your medicines at www.disposemymeds.org.          This list is accurate as of 10/31/18  2:13 PM.  Always use your most recent med list.                   Brand Name Dispense Instructions for use Diagnosis    cyclobenzaprine 10 MG tablet    FLEXERIL    30 tablet    Take 0.5-1 tablets (5-10 mg) by mouth 3 times daily as needed for muscle spasms    Acute right-sided low back pain without sciatica       ESCITALOPRAM OXALATE PO      Take 10 mg by mouth daily        HYDROXYZINE HCL PO      Take 25 mg by mouth 3 times daily as needed for itching        levonorgestrel-ethinyl estradiol 0.15-30 MG-MCG per tablet    NORDETTE    90 tablet    Take 1 tablet by mouth daily    Encounter for surveillance of contraceptive pills

## 2018-11-01 ASSESSMENT — ANXIETY QUESTIONNAIRES: GAD7 TOTAL SCORE: 2

## 2018-12-17 PROBLEM — M54.41 ACUTE LEFT-SIDED LOW BACK PAIN WITH BILATERAL SCIATICA: Status: RESOLVED | Noted: 2018-07-30 | Resolved: 2018-12-17

## 2018-12-17 PROBLEM — M53.3 SACROILIAC JOINT PAIN: Status: RESOLVED | Noted: 2018-07-30 | Resolved: 2018-12-17

## 2018-12-17 PROBLEM — M54.42 ACUTE LEFT-SIDED LOW BACK PAIN WITH BILATERAL SCIATICA: Status: RESOLVED | Noted: 2018-07-30 | Resolved: 2018-12-17

## 2018-12-17 NOTE — PROGRESS NOTES
Subjective:  HPI                    Objective:  System    Physical Exam    General     ROS    Assessment/Plan:    DISCHARGE REPORT    Progress reporting period is from 8/29/18 to 12/17/18.     SUBJECTIVE  Subjective: Exercises are going well, some are getting easy. Lifted a box at work yesterday and felt a muscle strain in her back but feels better today.           Changes in function: Yes, see goal flow sheet for change in function   Adverse reactions: None;   ,     Patient has failed to return to therapy so current objective findings are unknown.  The subjective and objective information are from the last SOAP note on this patient.  The objective findings are from DOS 8/29/18.    OBJECTIVE  Objective: Good muscle control during all exercises. No increase in pain but increase in muscle soreness(due to fatigue).      ASSESSMENT/PLAN  Updated problem list and treatment plan: Diagnosis 1:  LBP  Pain -  hot/cold therapy, manual therapy, self management, education and home program  Decreased ROM/flexibility - manual therapy, therapeutic exercise, therapeutic activity and home program  Decreased strength - therapeutic exercise, therapeutic activities and home program  Impaired muscle performance - neuro re-education and home program  Decreased function - therapeutic activities and home program  Impaired posture - neuro re-education, therapeutic activities and home program  STG/LTGs have been met or progress has been made towards goals:  Unknown as patient did not return for final assessment.    Assessment of Progress: The patient has not returned to therapy. Current status is unknown.  Self Management Plans:  Patient has been instructed in a home treatment program.  Patient  has been instructed in self management of symptoms.    Eliz continues to require the following intervention to meet STG and LTG's: PT intervention is no longer required to meet STG/LTG.  The patient failed to complete scheduled/ordered  appointments so current information is unknown.  We will discharge this patient from PT.    Recommendations:  discharge as patient did not follow up    Please refer to the daily flowsheet for treatment today, total treatment time and time spent performing 1:1 timed codes.

## 2019-01-07 ENCOUNTER — MYC MEDICAL ADVICE (OUTPATIENT)
Dept: FAMILY MEDICINE | Facility: CLINIC | Age: 29
End: 2019-01-07

## 2019-04-03 ENCOUNTER — OFFICE VISIT (OUTPATIENT)
Dept: FAMILY MEDICINE | Facility: CLINIC | Age: 29
End: 2019-04-03
Payer: COMMERCIAL

## 2019-04-03 VITALS
HEART RATE: 75 BPM | BODY MASS INDEX: 29.09 KG/M2 | OXYGEN SATURATION: 98 % | WEIGHT: 174.6 LBS | HEIGHT: 65 IN | RESPIRATION RATE: 16 BRPM | TEMPERATURE: 98.8 F | SYSTOLIC BLOOD PRESSURE: 112 MMHG | DIASTOLIC BLOOD PRESSURE: 62 MMHG

## 2019-04-03 DIAGNOSIS — B34.9 VIRAL ILLNESS: ICD-10-CM

## 2019-04-03 DIAGNOSIS — R07.0 THROAT PAIN: ICD-10-CM

## 2019-04-03 DIAGNOSIS — Z20.828 EXPOSURE TO INFLUENZA: Primary | ICD-10-CM

## 2019-04-03 LAB
DEPRECATED S PYO AG THROAT QL EIA: NORMAL
FLUAV+FLUBV AG SPEC QL: NEGATIVE
FLUAV+FLUBV AG SPEC QL: NEGATIVE
SPECIMEN SOURCE: NORMAL
SPECIMEN SOURCE: NORMAL

## 2019-04-03 PROCEDURE — 87081 CULTURE SCREEN ONLY: CPT | Performed by: NURSE PRACTITIONER

## 2019-04-03 PROCEDURE — 99213 OFFICE O/P EST LOW 20 MIN: CPT | Performed by: NURSE PRACTITIONER

## 2019-04-03 PROCEDURE — 87880 STREP A ASSAY W/OPTIC: CPT | Performed by: NURSE PRACTITIONER

## 2019-04-03 PROCEDURE — 87804 INFLUENZA ASSAY W/OPTIC: CPT | Performed by: NURSE PRACTITIONER

## 2019-04-03 ASSESSMENT — MIFFLIN-ST. JEOR: SCORE: 1532.86

## 2019-04-03 ASSESSMENT — PAIN SCALES - GENERAL: PAINLEVEL: MILD PAIN (2)

## 2019-04-03 NOTE — PROGRESS NOTES
SUBJECTIVE:   Eliz Mckinney is a 28 year old female who presents to clinic today for the following health issues:      ENT Symptoms             Symptoms: cc Present Absent Comment   Fever/Chills  x  99.8 at home   Fatigue   x    Muscle Aches  x     Eye Irritation   x    Sneezing   x    Nasal Pedro/Drg  x     Sinus Pressure/Pain  x     Loss of smell  x     Dental pain   x    Sore Throat x x     Swollen Glands  x     Ear Pain/Fullness  x  Right ear pain, popping when she swallows   Cough  x  Producing yellow/bloody sputum in the morning   Wheeze   x    Chest Pain   x    Shortness of breath   x    Rash   x    Other   x      Symptom duration:  4 days   Symptom severity:  mild   Treatments tried:  Dayquil, Nyquil   Contacts:  influenza exposure      Missed work yesterday. Needs note.     Has not felt well for the last 4 days. Missed work yesterday. Others think has influenza. Cough has been productive, and that is getting gradually better. Will be very productive in the AM and then will be ok in the PM. Sore throat that has been constant, does think is some better today. Temp yesterday 99.8 at home. Did have an influenza vaccine this fall.     Problem list and histories reviewed & adjusted, as indicated.  Additional history: as documented    Current Outpatient Medications   Medication Sig Dispense Refill     cyclobenzaprine (FLEXERIL) 10 MG tablet Take 0.5-1 tablets (5-10 mg) by mouth 3 times daily as needed for muscle spasms 30 tablet 1     ESCITALOPRAM OXALATE PO Take 10 mg by mouth daily       HYDROXYZINE HCL PO Take 25 mg by mouth 3 times daily as needed for itching       levonorgestrel-ethinyl estradiol (NORDETTE) 0.15-30 MG-MCG per tablet Take 1 tablet by mouth daily 90 tablet 3     Allergies   Allergen Reactions     Nkda [No Known Drug Allergies]        Reviewed and updated as needed this visit by clinical staff  Tobacco  Allergies  Meds  Med Hx  Surg Hx  Fam Hx  Soc Hx      Reviewed and updated as  "needed this visit by Provider         ROS:  Review of Systems   Constitutional: Positive for chills and fever. Negative for diaphoresis and fatigue.   HENT: Positive for congestion, ear pain (right), sinus pressure and sore throat. Negative for ear discharge, hearing loss and rhinorrhea.    Eyes: Negative for discharge and itching.   Respiratory: Positive for cough (productive in the AM). Negative for shortness of breath and wheezing.    Gastrointestinal: Negative for constipation, diarrhea and nausea.   Musculoskeletal: Positive for myalgias.   Skin: Negative for rash.   Neurological: Negative for dizziness, light-headedness and headaches.         OBJECTIVE:     /62 (BP Location: Right arm, Patient Position: Sitting, Cuff Size: Adult Regular)   Pulse 75   Temp 98.8  F (37.1  C) (Tympanic)   Resp 16   Ht 1.651 m (5' 5\")   Wt 79.2 kg (174 lb 9.6 oz)   LMP 03/31/2019   SpO2 98%   BMI 29.05 kg/m    Body mass index is 29.05 kg/m .  Physical Exam   Constitutional: She appears well-developed.   HENT:   Right Ear: External ear normal. Tympanic membrane is not erythematous. A middle ear effusion is present.   Left Ear: Tympanic membrane and external ear normal. Tympanic membrane is not erythematous.  No middle ear effusion.   Nose: No mucosal edema or rhinorrhea.   Mouth/Throat:       Cardiovascular: Normal rate, regular rhythm and normal heart sounds.   Pulmonary/Chest: Effort normal and breath sounds normal.   Abdominal: Soft. Bowel sounds are normal.   Neurological: She is alert.   Skin: Skin is warm and dry.   Psychiatric: She has a normal mood and affect.       ASSESSMENT/PLAN:   1. Exposure to influenza  - Influenza A/B antigen    2. Throat pain  - Rapid strep screen  - Beta strep group A culture    3. Viral illness  Reviewed treatment plan. Explained the antibiotics are not indicated  Reviewed fever and pain control with tylenol and/or ibuprofen  Instructed to call or return for:  --Symptoms not " improved in the next 3 days  --Worsening symptom  --New unexplained symptoms develop     Work note given     NICOLE Costa [facility]

## 2019-04-03 NOTE — PATIENT INSTRUCTIONS
Patient Education     Viral Pharyngitis (Sore Throat)    You or your child have pharyngitis (sore throat). This infection is caused by a virus. It can cause throat pain that is worse when swallowing, aching all over, headache, and fever. The infection may be spread by coughing, kissing, or touching others after touching your mouth or nose. Antibiotic medicines do not work against viruses. They are not used for treating this illness.  Home care    If symptoms are severe, you or your child should rest at home. Return to work or school when you or your child feel well enough.     You or your child should drink plenty of fluids to prevent dehydration.    Use throat lozenges or numbing throat sprays to help reduce pain. Gargling with warm salt water will also help reduce throat pain. Dissolve 1/2 teaspoon of salt in 1 glass of warm water. Children can sip on juice or a popsicle. Children 5 years and older can also suck on a lollipop or hard candy.    Don t eat salty or spicy foods or give them to your child. These can be irritating to the throat.  Medicines for a child: You can give your child acetaminophen for fever, fussiness, or discomfort. In babies over 6 months of age, you may use ibuprofen instead of acetaminophen. If your child has chronic liver or kidney disease or ever had a stomach ulcer or GI bleeding, talk with your child s healthcare provider before giving these medicines. Aspirin should never be used by any child under 18 years of age who has a fever. It may cause severe liver damage.  Medicines for an adult: You may use acetaminophen or ibuprofen to control pain or fever, unless another medicine was prescribed for this. If you have chronic liver or kidney disease or ever had a stomach ulcer or GI bleeding, talk with your healthcare provider before using these medicines.  Follow-up care  Follow up with a healthcare provider or our staff if you or your child are not getting better over the next week.  When  to seek medical advice  Call your healthcare provider right away if any of these occur:    Fever as directed by your healthcare provider.  For children, seek care if:  ? Your child is of any age and has repeated fevers above 104 F (40 C).  ? Your child is younger than 2 years of age and has a fever of 100.4 F (38 C) for more than 1 day.  ? Your child is 2 years old or older and has a fever of 100.4 F (38 C) for more than 3 days.    New or worsening ear pain, sinus pain, or headache    Painful lumps in the back of neck    Stiff neck    Lymph nodes are getting larger    Can t swallow liquids, a lot of drooling, or can t open mouth wide due to throat pain    Signs of dehydration, such as very dark urine or no urine, sunken eyes, dizziness    Trouble breathing or noisy breathing    Muffled voice    New rash    Other symptoms are getting worse  Date Last Reviewed: 10/1/2017    0925-0614 [employer]. [address] [place] [address], [place] [address]. All rights reserved. This information is not intended as a substitute for professional medical care. Always follow your healthcare professional's instructions.

## 2019-04-03 NOTE — LETTER
[facility]  [address] [address]  Phone: [phone]    April 3, 2019        Eliz Mckinney  [address]  [place] [address]          To whom it may concern:    RE: Eliz Mckinney    Patient was seen and treated today at our clinic. Please excuse her from work 4/3/19.    Please contact me for questions or concerns.      Sincerely,        NICOLE Costa CNP

## 2019-04-04 LAB
BACTERIA SPEC CULT: NORMAL
SPECIMEN SOURCE: NORMAL

## 2019-04-04 ASSESSMENT — ENCOUNTER SYMPTOMS
DIZZINESS: 0
CONSTIPATION: 0
FEVER: 1
COUGH: 1
HEADACHES: 0
WHEEZING: 0
CHILLS: 1
SORE THROAT: 1
RHINORRHEA: 0
DIARRHEA: 0
DIAPHORESIS: 0
EYE DISCHARGE: 0
EYE ITCHING: 0
SINUS PRESSURE: 1
FATIGUE: 0
MYALGIAS: 1
NAUSEA: 0
LIGHT-HEADEDNESS: 0
SHORTNESS OF BREATH: 0

## 2019-05-09 ENCOUNTER — OFFICE VISIT (OUTPATIENT)
Dept: FAMILY MEDICINE | Facility: CLINIC | Age: 29
End: 2019-05-09
Payer: COMMERCIAL

## 2019-05-09 VITALS
SYSTOLIC BLOOD PRESSURE: 110 MMHG | HEART RATE: 78 BPM | WEIGHT: 173.6 LBS | DIASTOLIC BLOOD PRESSURE: 66 MMHG | TEMPERATURE: 98.3 F | RESPIRATION RATE: 14 BRPM | OXYGEN SATURATION: 97 % | BODY MASS INDEX: 28.89 KG/M2

## 2019-05-09 DIAGNOSIS — R07.0 THROAT PAIN: Primary | ICD-10-CM

## 2019-05-09 LAB
DEPRECATED S PYO AG THROAT QL EIA: NORMAL
SPECIMEN SOURCE: NORMAL

## 2019-05-09 PROCEDURE — 99213 OFFICE O/P EST LOW 20 MIN: CPT | Performed by: NURSE PRACTITIONER

## 2019-05-09 PROCEDURE — 87880 STREP A ASSAY W/OPTIC: CPT | Performed by: NURSE PRACTITIONER

## 2019-05-09 PROCEDURE — 87081 CULTURE SCREEN ONLY: CPT | Performed by: NURSE PRACTITIONER

## 2019-05-09 ASSESSMENT — PAIN SCALES - GENERAL: PAINLEVEL: SEVERE PAIN (6)

## 2019-05-09 NOTE — LETTER
[facility]   [address] [place]  [address]  Phone [phone]  Fax [phone]                     5/9/2019     Eliz Mckinney  [address]  [place] [address]    To Whom It May Concern,    Please excuse  Eliz from work today. She was seen at the clinic for  Sore throat.   Her strep test was negative and she may return to work tomorrow.     Sincerely,        Selina Alas APRN-CNP    [facility]  [address] [place] [address]  Phone: [phone]

## 2019-05-09 NOTE — NURSING NOTE
"Initial /66 (BP Location: Left arm, Patient Position: Chair, Cuff Size: Adult Regular)   Pulse 78   Temp 98.3  F (36.8  C) (Tympanic)   Resp 14   Wt 78.7 kg (173 lb 9.6 oz)   LMP 04/28/2019 (Approximate)   SpO2 97%   BMI 28.89 kg/m   Estimated body mass index is 28.89 kg/m  as calculated from the following:    Height as of 4/3/19: 1.651 m (5' 5\").    Weight as of this encounter: 78.7 kg (173 lb 9.6 oz). .    Florinda Landin CMA ([facility])    "

## 2019-05-09 NOTE — PROGRESS NOTES
SUBJECTIVE:   Eliz Mckinney is a 28 year old female who presents to clinic today for the following   health issues:    ENT Symptoms             Symptoms: cc Present Absent Comment   Fever/Chills   x    Fatigue  x  More tired than usual   Muscle Aches  x  Generalized aches   Eye Irritation   x    Sneezing   x    Nasal Pedro/Drg  x  Slight stuffy/runny   Sinus Pressure/Pain   x    Loss of smell   x    Dental pain   x    Sore Throat x   Onset last night,    Swollen Glands  x     Ear Pain/Fullness   x    Cough  x  Slightly productive cough   Wheeze   x    Chest Pain   x    Shortness of breath   x    Rash   x    Other   x      Symptom duration:  Yesterday   Symptom severity:  Moderate, worsening   Treatments tried:  Ibuprofen, dayquil  This has helped with  Throat pain .    Contacts:  Exposed to strep last week at work             Additional history: as documented    Reviewed  and updated as needed this visit by clinical staff         Reviewed and updated as needed this visit by Provider         Patient Active Problem List   Diagnosis     Excessive or frequent menstruation     Other specified gastritis without mention of hemorrhage     CARDIOVASCULAR SCREENING; LDL GOAL LESS THAN 130     Adjustment disorder with mixed anxiety and depressed mood     Past Surgical History:   Procedure Laterality Date     NO HISTORY OF SURGERY         Social History     Tobacco Use     Smoking status: Never Smoker     Smokeless tobacco: Never Used   Substance Use Topics     Alcohol use: No     Family History   Problem Relation Age of Onset     Diabetes Mother      Depression Mother      Cancer Maternal Grandfather         bladder/kidney         Current Outpatient Medications   Medication Sig Dispense Refill     cyclobenzaprine (FLEXERIL) 10 MG tablet Take 0.5-1 tablets (5-10 mg) by mouth 3 times daily as needed for muscle spasms 30 tablet 1     ESCITALOPRAM OXALATE PO Take 10 mg by mouth daily       HYDROXYZINE HCL PO Take 25 mg by  mouth 3 times daily as needed for itching       Allergies   Allergen Reactions     Nkda [No Known Drug Allergies]      BP Readings from Last 3 Encounters:   05/09/19 110/66   04/03/19 112/62   10/31/18 110/60    Wt Readings from Last 3 Encounters:   05/09/19 78.7 kg (173 lb 9.6 oz)   04/03/19 79.2 kg (174 lb 9.6 oz)   10/31/18 78.2 kg (172 lb 4.8 oz)                    ROS:  See notes above.       OBJECTIVE:     /66 (BP Location: Left arm, Patient Position: Chair, Cuff Size: Adult Regular)   Pulse 78   Temp 98.3  F (36.8  C) (Tympanic)   Resp 14   Wt 78.7 kg (173 lb 9.6 oz)   LMP 04/28/2019 (Approximate)   SpO2 97%   BMI 28.89 kg/m    Body mass index is 28.89 kg/m .   GENERAL: healthy, alert and no distress  HENT: normal cephalic/atraumatic, ear canals and TM's normal, nose and mouth without ulcers or lesions, oropharynx clear, oral mucous membranes moist and strep is negative,    NECK: no adenopathy, no asymmetry, masses, or scars and thyroid normal to palpation  RESP: lungs clear to auscultation - no rales, rhonchi or wheezes  CV: regular rate and rhythm, normal S1 S2, no S3 or S4, no murmur, click or rub, no peripheral edema and peripheral pulses strong      Diagnostic Test Results:  Results for orders placed or performed in visit on 05/09/19 (from the past 24 hour(s))   Strep, Rapid Screen   Result Value Ref Range    Specimen Description Throat     Rapid Strep A Screen       NEGATIVE: No Group A streptococcal antigen detected by immunoassay, await culture report.       ASSESSMENT/PLAN:     ASSESSMENT/PLAN:      ICD-10-CM    1. Throat pain R07.0 Strep, Rapid Screen     Beta strep group A culture       Patient Instructions   Your rapid strep came back negative      the strep culture was sent in .     For the sore throat use warm tea and honey or even just spoonful of honey and hold it to the back of the throat. This will help to decrease the inflammation and thin the mucous.    gargle with warm salt  water.       If the sore throat doesn't get better let me know          See Patient Instructions    DAMARIS DOE NP, APRN CNP  [facility]

## 2019-05-10 LAB
BACTERIA SPEC CULT: NORMAL
SPECIMEN SOURCE: NORMAL

## 2019-07-15 ENCOUNTER — OFFICE VISIT (OUTPATIENT)
Dept: FAMILY MEDICINE | Facility: CLINIC | Age: 29
End: 2019-07-15
Payer: COMMERCIAL

## 2019-07-15 VITALS
HEART RATE: 70 BPM | HEIGHT: 65 IN | TEMPERATURE: 98.9 F | BODY MASS INDEX: 29.19 KG/M2 | RESPIRATION RATE: 20 BRPM | SYSTOLIC BLOOD PRESSURE: 102 MMHG | WEIGHT: 175.2 LBS | DIASTOLIC BLOOD PRESSURE: 62 MMHG | OXYGEN SATURATION: 99 %

## 2019-07-15 DIAGNOSIS — J06.9 VIRAL URI: Primary | ICD-10-CM

## 2019-07-15 PROCEDURE — 99213 OFFICE O/P EST LOW 20 MIN: CPT | Performed by: PHYSICIAN ASSISTANT

## 2019-07-15 RX ORDER — HYDROXYZINE HYDROCHLORIDE 25 MG/1
25 TABLET, FILM COATED ORAL 3 TIMES DAILY PRN
COMMUNITY
Start: 2019-07-15 | End: 2023-12-20

## 2019-07-15 ASSESSMENT — ANXIETY QUESTIONNAIRES
GAD7 TOTAL SCORE: 3
7. FEELING AFRAID AS IF SOMETHING AWFUL MIGHT HAPPEN: NOT AT ALL
5. BEING SO RESTLESS THAT IT IS HARD TO SIT STILL: NOT AT ALL
6. BECOMING EASILY ANNOYED OR IRRITABLE: SEVERAL DAYS
2. NOT BEING ABLE TO STOP OR CONTROL WORRYING: NOT AT ALL
3. WORRYING TOO MUCH ABOUT DIFFERENT THINGS: SEVERAL DAYS
IF YOU CHECKED OFF ANY PROBLEMS ON THIS QUESTIONNAIRE, HOW DIFFICULT HAVE THESE PROBLEMS MADE IT FOR YOU TO DO YOUR WORK, TAKE CARE OF THINGS AT HOME, OR GET ALONG WITH OTHER PEOPLE: NOT DIFFICULT AT ALL
1. FEELING NERVOUS, ANXIOUS, OR ON EDGE: SEVERAL DAYS

## 2019-07-15 ASSESSMENT — PATIENT HEALTH QUESTIONNAIRE - PHQ9
SUM OF ALL RESPONSES TO PHQ QUESTIONS 1-9: 5
5. POOR APPETITE OR OVEREATING: NOT AT ALL

## 2019-07-15 ASSESSMENT — MIFFLIN-ST. JEOR: SCORE: 1535.58

## 2019-07-15 NOTE — PROGRESS NOTES
Subjective     Eliz Mckinney is a 28 year old female who presents to clinic today for the following health issues:    HPI   ENT Symptoms             Symptoms: cc Present Absent Comment   Fever/Chills   x    Fatigue  x  Increased   Muscle Aches   x    Eye Irritation   x    Sneezing   x    Nasal Pedro/Drg   x    Sinus Pressure/Pain   x    Loss of smell   x    Dental pain       Sore Throat x x  Scratchy throat- now improved   Swollen Glands   x    Ear Pain/Fullness   x    Cough   x    Wheeze   x    Chest Pain   x    Shortness of breath   x    Rash   x    Other  x  Frontal HA- now improved     Symptom duration:  1 day   Symptom severity:  mild, improved now after sleeping all day   Treatments tried:  ibuprofen   Contacts:  Around kids over weekend with illness     * Needs note for employer    Jennifer Howard [facility]        Patient Active Problem List   Diagnosis     Excessive or frequent menstruation     Other specified gastritis without mention of hemorrhage     CARDIOVASCULAR SCREENING; LDL GOAL LESS THAN 130     Adjustment disorder with mixed anxiety and depressed mood     Past Surgical History:   Procedure Laterality Date     NO HISTORY OF SURGERY         Social History     Tobacco Use     Smoking status: Never Smoker     Smokeless tobacco: Never Used   Substance Use Topics     Alcohol use: No     Family History   Problem Relation Age of Onset     Diabetes Mother      Depression Mother      Cancer Maternal Grandfather         bladder/kidney         Current Outpatient Medications   Medication Sig Dispense Refill     ESCITALOPRAM OXALATE PO Take 10 mg by mouth daily       hydrOXYzine (ATARAX) 25 MG tablet Take 1 tablet (25 mg) by mouth 3 times daily as needed for anxiety       cyclobenzaprine (FLEXERIL) 10 MG tablet Take 0.5-1 tablets (5-10 mg) by mouth 3 times daily as needed for muscle spasms (Patient not taking: Reported on 7/15/2019) 30 tablet 1     BP Readings from Last 3 Encounters:   07/15/19 102/62   05/09/19  "110/66   04/03/19 112/62    Wt Readings from Last 3 Encounters:   07/15/19 79.5 kg (175 lb 3.2 oz)   05/09/19 78.7 kg (173 lb 9.6 oz)   04/03/19 79.2 kg (174 lb 9.6 oz)                      Reviewed and updated as needed this visit by Provider  Tobacco  Allergies  Meds  Problems  Med Hx  Surg Hx  Fam Hx         Review of Systems   ROS COMP: Constitutional, HEENT, cardiovascular, pulmonary, gi and gu systems are negative, except as otherwise noted.      Objective    /62   Pulse 70   Temp 98.9  F (37.2  C) (Tympanic)   Resp 20   Ht 1.651 m (5' 5\")   Wt 79.5 kg (175 lb 3.2 oz)   SpO2 99%   Breastfeeding? No   BMI 29.15 kg/m    Body mass index is 29.15 kg/m .  Physical Exam   GENERAL: healthy, alert and no distress  EYES: Eyes grossly normal to inspection, PERRL and conjunctivae and sclerae normal  HENT: ear canals and TM's normal, nose and mouth without ulcers or lesions  NECK: no adenopathy, no asymmetry, masses, or scars and thyroid normal to palpation  RESP: lungs clear to auscultation - no rales, rhonchi or wheezes  MS: no gross musculoskeletal defects noted, no edema    Diagnostic Test Results:  none         Assessment & Plan     1. Viral URI  URI (Upper Respiratory Infection)  (primary encounter diagnosis)    I discussed the pathophysiology of upper respiratory infections and likely viral etiology.   Discussed general respiratory tract infection care including importance of hydration, rest, over the counter therapies and techniques to prevent future infection as well as transmission to others.  Discussed signs or symptoms that would indicate need for recheck.    Patient was instructed to f/u or call if symptoms worsen or fail to improve as anticipated.          BMI:   Estimated body mass index is 29.15 kg/m  as calculated from the following:    Height as of this encounter: 1.651 m (5' 5\").    Weight as of this encounter: 79.5 kg (175 lb 3.2 oz).               Return in about 1 week (around " 7/22/2019) for a recheck if symptoms do not improve.    Silvia Hu PA-C  [facility]

## 2019-07-15 NOTE — LETTER
[facility]  [address] [place] [id number]  Phone: [phone]    July 15, 2019        Eliz GAMBINO yvette  [address]  [place] [address]          To whom it may concern:    RE: Eliz Mckinney    Patient was seen and treated today at our clinic and missed work.    Please contact me for questions or concerns.      Sincerely,        Silvia Hu PA-C

## 2019-07-16 ASSESSMENT — ANXIETY QUESTIONNAIRES: GAD7 TOTAL SCORE: 3

## 2019-07-24 ENCOUNTER — TELEPHONE (OUTPATIENT)
Dept: FAMILY MEDICINE | Facility: CLINIC | Age: 29
End: 2019-07-24

## 2019-07-24 NOTE — TELEPHONE ENCOUNTER
I have FMLA forms on my desk from [employer] where Eliz works for depression/anxiety.  Please call Eliz and she should f/u with her PCP for further discussion of this.  Forms placed in  basket (please given to whoever she decides to schedule with/whoever her PCP and please update PCP in chart accordingly).  I saw her for a cold recently . . . But not depression/anxiety    Silvia Hu PA-C

## 2019-07-25 NOTE — TELEPHONE ENCOUNTER
Left msg to return my call.    When pt calls back please  Read her the notes below, forms are in the in basket at the  desk , above Kim[employer] desk.  .     Ros Jensen, [place]

## 2019-07-30 NOTE — TELEPHONE ENCOUNTER
Patient called and says she comes to [facility] if you look back she has been coming here since 2017 Linda Arreola is not her PCP. Patients physiatrist filled out [place] paperwork for her already.  She will check with her work to see if they need the [place] paperwork to be filled out with us if so I told her she will need an appt.     Patricia Abbott, [place]

## 2019-11-03 ENCOUNTER — HEALTH MAINTENANCE LETTER (OUTPATIENT)
Age: 29
End: 2019-11-03

## 2019-12-03 ENCOUNTER — APPOINTMENT (OUTPATIENT)
Dept: OBGYN | Facility: CLINIC | Age: 29
End: 2019-12-03
Payer: COMMERCIAL

## 2019-12-03 ENCOUNTER — PRENATAL OFFICE VISIT (OUTPATIENT)
Dept: OBGYN | Facility: CLINIC | Age: 29
End: 2019-12-03

## 2019-12-03 DIAGNOSIS — Z34.00 PRENATAL CARE, FIRST PREGNANCY: ICD-10-CM

## 2019-12-03 PROCEDURE — 99207 ZZC NO CHARGE NURSE ONLY: CPT | Performed by: OBSTETRICS & GYNECOLOGY

## 2019-12-17 ENCOUNTER — PRENATAL OFFICE VISIT (OUTPATIENT)
Dept: OBGYN | Facility: CLINIC | Age: 29
End: 2019-12-17
Payer: COMMERCIAL

## 2019-12-17 VITALS
BODY MASS INDEX: 28.79 KG/M2 | HEART RATE: 85 BPM | TEMPERATURE: 97.9 F | WEIGHT: 172.8 LBS | SYSTOLIC BLOOD PRESSURE: 108 MMHG | HEIGHT: 65 IN | RESPIRATION RATE: 16 BRPM | DIASTOLIC BLOOD PRESSURE: 64 MMHG

## 2019-12-17 DIAGNOSIS — Z34.01 ENCOUNTER FOR PRENATAL CARE IN FIRST TRIMESTER OF FIRST PREGNANCY: Primary | ICD-10-CM

## 2019-12-17 LAB
ABO + RH BLD: NORMAL
ABO + RH BLD: NORMAL
ALBUMIN UR-MCNC: NEGATIVE MG/DL
APPEARANCE UR: CLEAR
BACTERIA #/AREA URNS HPF: ABNORMAL /HPF
BILIRUB UR QL STRIP: ABNORMAL
BLD GP AB SCN SERPL QL: NORMAL
BLOOD BANK CMNT PATIENT-IMP: NORMAL
COLOR UR AUTO: YELLOW
ERYTHROCYTE [DISTWIDTH] IN BLOOD BY AUTOMATED COUNT: 11.8 % (ref 10–15)
GLUCOSE UR STRIP-MCNC: NEGATIVE MG/DL
HCT VFR BLD AUTO: 34.5 % (ref 35–47)
HGB BLD-MCNC: 11.5 G/DL (ref 11.7–15.7)
HGB UR QL STRIP: ABNORMAL
KETONES UR STRIP-MCNC: 15 MG/DL
LEUKOCYTE ESTERASE UR QL STRIP: NEGATIVE
MCH RBC QN AUTO: 30.3 PG (ref 26.5–33)
MCHC RBC AUTO-ENTMCNC: 33.3 G/DL (ref 31.5–36.5)
MCV RBC AUTO: 91 FL (ref 78–100)
MUCOUS THREADS #/AREA URNS LPF: PRESENT /LPF
NITRATE UR QL: NEGATIVE
NON-SQ EPI CELLS #/AREA URNS LPF: ABNORMAL /LPF
PH UR STRIP: 5.5 PH (ref 5–7)
PLATELET # BLD AUTO: 232 10E9/L (ref 150–450)
RBC # BLD AUTO: 3.8 10E12/L (ref 3.8–5.2)
RBC #/AREA URNS AUTO: ABNORMAL /HPF
SOURCE: ABNORMAL
SP GR UR STRIP: >1.03 (ref 1–1.03)
SPECIMEN EXP DATE BLD: NORMAL
UROBILINOGEN UR STRIP-ACNC: 0.2 EU/DL (ref 0.2–1)
WBC # BLD AUTO: 8.3 10E9/L (ref 4–11)
WBC #/AREA URNS AUTO: ABNORMAL /HPF

## 2019-12-17 PROCEDURE — 86780 TREPONEMA PALLIDUM: CPT | Performed by: OBSTETRICS & GYNECOLOGY

## 2019-12-17 PROCEDURE — 76817 TRANSVAGINAL US OBSTETRIC: CPT | Performed by: OBSTETRICS & GYNECOLOGY

## 2019-12-17 PROCEDURE — 36415 COLL VENOUS BLD VENIPUNCTURE: CPT | Performed by: OBSTETRICS & GYNECOLOGY

## 2019-12-17 PROCEDURE — 86901 BLOOD TYPING SEROLOGIC RH(D): CPT | Performed by: OBSTETRICS & GYNECOLOGY

## 2019-12-17 PROCEDURE — 86850 RBC ANTIBODY SCREEN: CPT | Performed by: OBSTETRICS & GYNECOLOGY

## 2019-12-17 PROCEDURE — 86762 RUBELLA ANTIBODY: CPT | Performed by: OBSTETRICS & GYNECOLOGY

## 2019-12-17 PROCEDURE — 87591 N.GONORRHOEAE DNA AMP PROB: CPT | Performed by: OBSTETRICS & GYNECOLOGY

## 2019-12-17 PROCEDURE — 87389 HIV-1 AG W/HIV-1&-2 AB AG IA: CPT | Performed by: OBSTETRICS & GYNECOLOGY

## 2019-12-17 PROCEDURE — 87340 HEPATITIS B SURFACE AG IA: CPT | Performed by: OBSTETRICS & GYNECOLOGY

## 2019-12-17 PROCEDURE — 85027 COMPLETE CBC AUTOMATED: CPT | Performed by: OBSTETRICS & GYNECOLOGY

## 2019-12-17 PROCEDURE — 99207 ZZC FIRST OB VISIT: CPT | Performed by: OBSTETRICS & GYNECOLOGY

## 2019-12-17 PROCEDURE — 87491 CHLMYD TRACH DNA AMP PROBE: CPT | Performed by: OBSTETRICS & GYNECOLOGY

## 2019-12-17 PROCEDURE — 87086 URINE CULTURE/COLONY COUNT: CPT | Performed by: OBSTETRICS & GYNECOLOGY

## 2019-12-17 PROCEDURE — 86900 BLOOD TYPING SEROLOGIC ABO: CPT | Performed by: OBSTETRICS & GYNECOLOGY

## 2019-12-17 PROCEDURE — 81001 URINALYSIS AUTO W/SCOPE: CPT | Performed by: OBSTETRICS & GYNECOLOGY

## 2019-12-17 ASSESSMENT — MIFFLIN-ST. JEOR: SCORE: 1515.73

## 2019-12-17 NOTE — PROGRESS NOTES
Eliz Mckinney is a 29 year old  who presents to the clinic for an new ob visit.    Estimated Date of Delivery: Jul 21, 2020 is calculated from Patient's last menstrual period was 10/15/2019.   She has not had bleeding since her LMP.   She has had mild nausea. Weigh loss has not occurred.   This was a planned pregnancy.   ROGER is involved,  Zhen   OTHER CONCERNS:   INFECTION HISTORY  HIV: no  Hepatitis B: no  Hepatitis C: no  Syphilis:  no  Tuberculosis: no   PPD- no  Herpes self: no  Herpes partner:  no  Chlamydia:  no  Gonorrhea:  no  HPV: no  BV:  no  Trichomonis:  no  Chicken Pox:  YES  ====================================================  PERSONAL/SOCIAL HISTORY  Lives lives with their spouse.  Employment: Full time.  Her job involves moderate activity .  Additional items: None  =====================================================   REVIEW OF SYSTEMS  CONSTITUTIONAL: NEGATIVE for fever, chills  INTEGUMENTARY/SKIN: NEGATIVE for worrisome rashes, moles or lesions  EYES: NEGATIVE for vision changes   ENT/MOUTH: NEGATIVE for ear, mouth and throat problems  RESP: NEGATIVE for significant cough or SOB  BREAST: NEGATIVE for masses, tenderness or discharge  CV: NEGATIVE for chest pain, palpitations   GI: NEGATIVE for nausea, abdominal pain, heartburn, or change in bowel habits  : NEGATIVE for frequency, dysuria, or hematuria  MUSCULOSKELETAL: NEGATIVE for significant arthralgias or myalgia  NEURO: NEGATIVE for weakness, dizziness or paresthesias or headache  ENDOCRINE: NEGATIVE for temperature intolerance, skin/hair changes  HEME: NEGATIVE for bleeding problems  PSYCHIATRIC: NEGATIVE for changes in mood or affect  ====================================================  PHYSICAL EXAM:  LMP 10/15/2019   BMI-       RECOMMENDED WEIGHT GAIN: 15-25 lbs.  GENERAL:  Pleasant pregnant female, alert, well groomed.  SKIN:  Warm and dry, without lesions or rashes  HEAD: Symmetrical features.  EYES:  PERRLA,   MOUTH:   Buccal mucosa pink, moist without lesions.    NECK:  Thyroid without enlargement and nodules.  Lymph nodes not palpable.   LUNGS:  Clear to auscultation.  BREAST:  Symmetrical.  No dominant, fixed or suspicious masses are noted.  No skin or nipple changes or axillary nodes.    Nipples everted.      HEART:  RRR without murmur.  ABDOMEN: Soft without masses , tenderness or organomegaly.  No CVA tenderness. No scars noted..   MUSCULOSKELETAL:  Full range of motion  EXTREMITIES:  No edema. No significant varicosities.   GENITALIA:  BUS WNL, no lesions noted   VAGINA:  Pink, normal rugae and discharge normal and physiologic,   CERVIX:  smooth, without discharge or CMT and nulliparous os,   firm/ closed 4 cm long.  UTERUS: Midposition, nontender 8 weeks in size.  ADNEXA: Without masses or tenderness  PELVIS:  Arch; wide . Sacrum; deep. Spines;blunt.  Side walls; straight. Type; gynecoid  PELVIS:   Adequate, Pelvis proven to -pelvis not tested.  RECTAL:  Normal appearance.  Digital exam deferred.  WET PREP:Not done  gonorrhea  =========================================  ASSESSMENT:  (Z34.01) Encounter for prenatal care in first trimester of first pregnancy  (primary encounter diagnosis)  Comment: Estimated Date of Delivery: 2020    Plan: ABO/Rh type and screen, CBC with platelets,         Neisseria gonorrhoeae PCR, Chlamydia         trachomatis PCR, Hepatitis B surface antigen,         HIV Antigen Antibody Combo, Rubella Antibody         IgG Quantitative, US OB Transvaginal Only,         Treponema Abs w Reflex to RPR and Titer, *UA         reflex to Microscopic, Urine Culture Aerobic         Bacterial            PREGNANCY AT RISK? no  ==========================================  PLAN:  Instructed on use of triage nurse line and contacting the on call CNM after hours for an urgent need such as fever, vagina bleeding, bladder or vaginal infection, rupture of membranes,  or term labor.    Discussed the  indications, uses for and false positives for quad screen, nuchal translucency and fetal survey ultrasound at 18-20 weeks gestation. Will inform us at the next visit if she wished to avail herself of these screens.  Instructed on best evidence for: weight gain for her BMI for pregnancy; healthy diet and foods to avoid; exercise and activity during pregnancy;avoiding exposure to toxoplasmosis; and maintenance of a generally healthy lifestyle.   Discussed the harms, benefits, side effects and alternative therapies for current prescribed and OTC medications.    Nigel Gamboa MD

## 2019-12-18 LAB
BACTERIA SPEC CULT: NO GROWTH
C TRACH DNA SPEC QL NAA+PROBE: NEGATIVE
HBV SURFACE AG SERPL QL IA: NONREACTIVE
HIV 1+2 AB+HIV1 P24 AG SERPL QL IA: NONREACTIVE
N GONORRHOEA DNA SPEC QL NAA+PROBE: NEGATIVE
RUBV IGG SERPL IA-ACNC: 24 IU/ML
SPECIMEN SOURCE: NORMAL
T PALLIDUM AB SER QL: NONREACTIVE

## 2019-12-20 ENCOUNTER — TELEPHONE (OUTPATIENT)
Dept: LAB | Facility: CLINIC | Age: 29
End: 2019-12-20

## 2020-01-02 ENCOUNTER — TELEPHONE (OUTPATIENT)
Dept: OBGYN | Facility: CLINIC | Age: 30
End: 2020-01-02

## 2020-01-02 ENCOUNTER — PRENATAL OFFICE VISIT (OUTPATIENT)
Dept: OBGYN | Facility: CLINIC | Age: 30
End: 2020-01-02
Payer: COMMERCIAL

## 2020-01-02 VITALS
BODY MASS INDEX: 29.01 KG/M2 | SYSTOLIC BLOOD PRESSURE: 109 MMHG | TEMPERATURE: 97.3 F | WEIGHT: 173 LBS | DIASTOLIC BLOOD PRESSURE: 53 MMHG | HEART RATE: 67 BPM

## 2020-01-02 DIAGNOSIS — Z34.01 ENCOUNTER FOR PRENATAL CARE IN FIRST TRIMESTER OF FIRST PREGNANCY: Primary | ICD-10-CM

## 2020-01-02 PROCEDURE — 99207 ZZC PRENATAL VISIT: CPT | Performed by: OBSTETRICS & GYNECOLOGY

## 2020-01-02 PROCEDURE — 76817 TRANSVAGINAL US OBSTETRIC: CPT | Performed by: OBSTETRICS & GYNECOLOGY

## 2020-01-02 NOTE — TELEPHONE ENCOUNTER
Reason for call:  Patient reporting a symptom    Symptom or request: Had some spotting a couple of days ago.  Now today more bleeding with bright red blood.  Called earlier - spoke to BC - no cramping but mild tight back and sore.  10 1/2 wks pregnant.    Duration (how long have symptoms been present):     Have you been treated for this before? No    Additional comments:     Phone Number patient can be reached at:  Home number on file [phone] (home)    Best Time:      Can we leave a detailed message on this number:  YES    Call taken on 1/2/2020 at 9:11 AM by Patricia Hamlin

## 2020-01-02 NOTE — PROGRESS NOTES
"[facility]   OB/GYN Clinic    CC: Return OB     Subjective:    Eliz is a 29 year old  at 11w0d  who presents for spotting in the first trimester. Mild back pain. No abdominal pain. No leaking fluid. Otherwise feeling well. Has had mild spotting twice.     Objective:  /53 (BP Location: Right arm, Patient Position: Chair, Cuff Size: Adult Regular)   Pulse 67   Temp 97.3  F (36.3  C) (Tympanic)   Wt 78.5 kg (173 lb)   LMP 10/15/2019   Breastfeeding No   BMI 29.01 kg/m      Estimated body mass index is 29.01 kg/m  as calculated from the following:    Height as of 19: 1.645 m (5' 4.75\").    Weight as of this encounter: 78.5 kg (173 lb).    Physical Exam:  Gen: Pleasant, talkative female in no apparent distress   Respiratory: breathing comfortably on room air   Cardiac: Warm and well-perfused.   GI: Abd soft and non-tender, gravid  : no blood in vaginal vault, cervix with small area of irritation noted at 6 o clock. Normal appearing external genitalia and vaginal mucosa.  MSK: Grossly normal movement of all four extremities  Psych: mood and affect bright   Lower extremity: edema not present     US <14:  FHR noted to be 167 bpm,     Assessment/Plan:   29 year old  at 11w0d who presents spotting in early pregnancy.for spotting in early pregnancy. Exam without any bleeding, closed cervix and normal IUP visualized on US with FHR of 167 bpm. Bleeding precautions given and recommended follow up for regular prenatal care.    Return to clinic for next routine ob visit already scheduled.    Pepper Stark MD   2020 3:14 PM     "

## 2020-01-02 NOTE — NURSING NOTE
"Initial /53 (BP Location: Right arm, Patient Position: Chair, Cuff Size: Adult Regular)   Pulse 67   Temp 97.3  F (36.3  C) (Tympanic)   Wt 78.5 kg (173 lb)   LMP 10/15/2019   Breastfeeding No   BMI 29.01 kg/m   Estimated body mass index is 29.01 kg/m  as calculated from the following:    Height as of 12/17/19: 1.645 m (5' 4.75\").    Weight as of this encounter: 78.5 kg (173 lb). .    Carmencita Mascorro MA    "

## 2020-01-02 NOTE — TELEPHONE ENCOUNTER
"Return call to patient.  Spoke with patient on the phone.      S-(situation): Spotting last week, improved. Today with urination, 4-5 wipes of bright red bleeding now \" more pink.\" Patient denies cramping but reports \" low back ache that feels tight \"    B-(background):  at 11W gestation    A-(assessment): bright red bleeding, low back ache    R-(recommendations): Encouraged patient to come to clinic to further evaluate or monitor at home. Reviewed with patient this may be normal or a warning sign - recommendation is to further evaluate for peace of mind.    Pt in agreement and reports understanding.  Appointment scheduled for today.    Marian Humphrey   Ob/Gyn Clinic  RN      "

## 2020-01-14 ENCOUNTER — PRENATAL OFFICE VISIT (OUTPATIENT)
Dept: OBGYN | Facility: CLINIC | Age: 30
End: 2020-01-14
Payer: COMMERCIAL

## 2020-01-14 VITALS
TEMPERATURE: 97.5 F | WEIGHT: 171.6 LBS | DIASTOLIC BLOOD PRESSURE: 56 MMHG | RESPIRATION RATE: 16 BRPM | HEIGHT: 65 IN | SYSTOLIC BLOOD PRESSURE: 101 MMHG | HEART RATE: 70 BPM | BODY MASS INDEX: 28.59 KG/M2

## 2020-01-14 DIAGNOSIS — Z34.01 ENCOUNTER FOR SUPERVISION OF NORMAL FIRST PREGNANCY IN FIRST TRIMESTER: Primary | ICD-10-CM

## 2020-01-14 PROCEDURE — 99207 ZZC PRENATAL VISIT: CPT | Performed by: OBSTETRICS & GYNECOLOGY

## 2020-01-14 ASSESSMENT — MIFFLIN-ST. JEOR: SCORE: 1510.28

## 2020-01-15 NOTE — PROGRESS NOTES
Concerns: none  Doing well.  No concerns today.  Discussed anenatal screening.  She declines testing at this time.  Reportable signs and symptoms discussed.  Will schedule anatomy ultrasound.  RTC 4 weeks.      Nigel Gamboa MD

## 2020-02-10 ENCOUNTER — HEALTH MAINTENANCE LETTER (OUTPATIENT)
Age: 30
End: 2020-02-10

## 2020-02-18 ENCOUNTER — PRENATAL OFFICE VISIT (OUTPATIENT)
Dept: OBGYN | Facility: CLINIC | Age: 30
End: 2020-02-18
Payer: COMMERCIAL

## 2020-02-18 VITALS
RESPIRATION RATE: 16 BRPM | SYSTOLIC BLOOD PRESSURE: 120 MMHG | TEMPERATURE: 97.9 F | WEIGHT: 174.4 LBS | BODY MASS INDEX: 29.06 KG/M2 | HEART RATE: 92 BPM | DIASTOLIC BLOOD PRESSURE: 64 MMHG | HEIGHT: 65 IN

## 2020-02-18 DIAGNOSIS — Z34.02 ENCOUNTER FOR PRENATAL CARE IN SECOND TRIMESTER OF FIRST PREGNANCY: Primary | ICD-10-CM

## 2020-02-18 PROCEDURE — 99207 ZZC PRENATAL VISIT: CPT | Performed by: OBSTETRICS & GYNECOLOGY

## 2020-02-18 ASSESSMENT — MIFFLIN-ST. JEOR: SCORE: 1522.98

## 2020-02-18 NOTE — PROGRESS NOTES
Concerns today: none  Doing well.  No concerns today.  No nausea/vomiting. No heartburn.  No vaginal bleeding, no contractions/severe cramping, no leakage of fluid.  No vaginal discharge. No dysuria  No headache, vision changes, lower extremity swelling, upper abdominal pain, chest pain, shortness of breath.   Reportable signs and symptoms discussed.      Nigel Gamboa MD

## 2020-03-04 ENCOUNTER — ANCILLARY PROCEDURE (OUTPATIENT)
Dept: ULTRASOUND IMAGING | Facility: CLINIC | Age: 30
End: 2020-03-04
Attending: OBSTETRICS & GYNECOLOGY
Payer: COMMERCIAL

## 2020-03-04 DIAGNOSIS — Z34.02 ENCOUNTER FOR PRENATAL CARE IN SECOND TRIMESTER OF FIRST PREGNANCY: ICD-10-CM

## 2020-03-04 PROCEDURE — 76805 OB US >/= 14 WKS SNGL FETUS: CPT

## 2020-03-05 DIAGNOSIS — Z34.02 ENCOUNTER FOR SUPERVISION OF NORMAL FIRST PREGNANCY IN SECOND TRIMESTER: Primary | ICD-10-CM

## 2020-03-05 DIAGNOSIS — Z34.82 PRENATAL CARE, SUBSEQUENT PREGNANCY, SECOND TRIMESTER: ICD-10-CM

## 2020-03-05 NOTE — RESULT ENCOUNTER NOTE
Your results are back and the anatomy that they saw was normal.  Unfortunately, they did not see everything that they planned on seeing.  Therefore, we should plan on a follow up ULTRASOUND in the next month.    Nigel Gamboa MD

## 2020-03-18 ENCOUNTER — ANCILLARY PROCEDURE (OUTPATIENT)
Dept: ULTRASOUND IMAGING | Facility: CLINIC | Age: 30
End: 2020-03-18
Attending: OBSTETRICS & GYNECOLOGY
Payer: COMMERCIAL

## 2020-03-18 DIAGNOSIS — Z34.02 ENCOUNTER FOR SUPERVISION OF NORMAL FIRST PREGNANCY IN SECOND TRIMESTER: ICD-10-CM

## 2020-03-18 PROCEDURE — 76816 OB US FOLLOW-UP PER FETUS: CPT

## 2020-03-26 ENCOUNTER — PRENATAL OFFICE VISIT (OUTPATIENT)
Dept: OBGYN | Facility: CLINIC | Age: 30
End: 2020-03-26
Payer: COMMERCIAL

## 2020-03-26 VITALS
BODY MASS INDEX: 29.99 KG/M2 | WEIGHT: 180 LBS | HEIGHT: 65 IN | DIASTOLIC BLOOD PRESSURE: 54 MMHG | SYSTOLIC BLOOD PRESSURE: 105 MMHG | HEART RATE: 71 BPM | RESPIRATION RATE: 18 BRPM | TEMPERATURE: 97.3 F

## 2020-03-26 DIAGNOSIS — Z34.02 ENCOUNTER FOR PRENATAL CARE IN SECOND TRIMESTER OF FIRST PREGNANCY: Primary | ICD-10-CM

## 2020-03-26 PROCEDURE — 99207 ZZC PRENATAL VISIT: CPT | Performed by: OBSTETRICS & GYNECOLOGY

## 2020-03-26 ASSESSMENT — MIFFLIN-ST. JEOR: SCORE: 1548.38

## 2020-03-26 NOTE — PROGRESS NOTES
"CC: Here for routine prenatal visit @ 23w0d   HPI:  CB class for May was cancelled; feeling well; discussed COVID-19 implications in pregnancy, when to seek of emergency care    PE: /54 (BP Location: Left arm, Patient Position: Chair, Cuff Size: Adult Large)   Pulse 71   Temp 97.3  F (36.3  C) (Tympanic)   Resp 18   Ht 1.645 m (5' 4.75\")   Wt 81.6 kg (180 lb)   LMP 10/15/2019   BMI 30.19 kg/m     See OB flowsheet      A:  1. Encounter for prenatal care in second trimester of first pregnancy        Routine prenatal care  RTC 4 weeks.      Tiana Stein M.D.     "

## 2020-03-26 NOTE — NURSING NOTE
"Initial /54 (BP Location: Left arm, Patient Position: Chair, Cuff Size: Adult Large)   Pulse 71   Temp 97.3  F (36.3  C) (Tympanic)   Resp 18   Ht 1.645 m (5' 4.75\")   Wt 81.6 kg (180 lb)   LMP 10/15/2019   BMI 30.19 kg/m   Estimated body mass index is 30.19 kg/m  as calculated from the following:    Height as of this encounter: 1.645 m (5' 4.75\").    Weight as of this encounter: 81.6 kg (180 lb). .      "

## 2020-04-27 ENCOUNTER — PRENATAL OFFICE VISIT (OUTPATIENT)
Dept: OBGYN | Facility: CLINIC | Age: 30
End: 2020-04-27
Payer: COMMERCIAL

## 2020-04-27 VITALS
HEIGHT: 65 IN | BODY MASS INDEX: 30.57 KG/M2 | TEMPERATURE: 97.1 F | SYSTOLIC BLOOD PRESSURE: 107 MMHG | WEIGHT: 183.5 LBS | RESPIRATION RATE: 16 BRPM | DIASTOLIC BLOOD PRESSURE: 69 MMHG

## 2020-04-27 DIAGNOSIS — Z34.02 ENCOUNTER FOR PRENATAL CARE IN SECOND TRIMESTER OF FIRST PREGNANCY: Primary | ICD-10-CM

## 2020-04-27 LAB
ERYTHROCYTE [DISTWIDTH] IN BLOOD BY AUTOMATED COUNT: 12.9 % (ref 10–15)
GLUCOSE 1H P 50 G GLC PO SERPL-MCNC: 110 MG/DL (ref 60–129)
HCT VFR BLD AUTO: 32.2 % (ref 35–47)
HGB BLD-MCNC: 10.9 G/DL (ref 11.7–15.7)
MCH RBC QN AUTO: 31.5 PG (ref 26.5–33)
MCHC RBC AUTO-ENTMCNC: 33.9 G/DL (ref 31.5–36.5)
MCV RBC AUTO: 93 FL (ref 78–100)
PLATELET # BLD AUTO: 196 10E9/L (ref 150–450)
RBC # BLD AUTO: 3.46 10E12/L (ref 3.8–5.2)
WBC # BLD AUTO: 8.4 10E9/L (ref 4–11)

## 2020-04-27 PROCEDURE — 36415 COLL VENOUS BLD VENIPUNCTURE: CPT | Performed by: OBSTETRICS & GYNECOLOGY

## 2020-04-27 PROCEDURE — 85027 COMPLETE CBC AUTOMATED: CPT | Performed by: OBSTETRICS & GYNECOLOGY

## 2020-04-27 PROCEDURE — 86780 TREPONEMA PALLIDUM: CPT | Performed by: OBSTETRICS & GYNECOLOGY

## 2020-04-27 PROCEDURE — 82950 GLUCOSE TEST: CPT | Performed by: OBSTETRICS & GYNECOLOGY

## 2020-04-27 PROCEDURE — 90471 IMMUNIZATION ADMIN: CPT | Performed by: OBSTETRICS & GYNECOLOGY

## 2020-04-27 PROCEDURE — 99207 ZZC PRENATAL VISIT: CPT | Performed by: OBSTETRICS & GYNECOLOGY

## 2020-04-27 PROCEDURE — 90715 TDAP VACCINE 7 YRS/> IM: CPT | Performed by: OBSTETRICS & GYNECOLOGY

## 2020-04-27 RX ORDER — BREAST PUMP
1 EACH MISCELLANEOUS DAILY
Qty: 1 EACH | Refills: 0 | Status: SHIPPED | OUTPATIENT
Start: 2020-04-27 | End: 2022-01-07

## 2020-04-27 ASSESSMENT — MIFFLIN-ST. JEOR: SCORE: 1564.26

## 2020-04-27 NOTE — PROGRESS NOTES
Prior to immunization administration, verified patients identity using patient s name and date of birth. Please see Immunization Activity for additional information.     Screening Questionnaire for Adult Immunization    Are you sick today?   No   Do you have allergies to medications, food, a vaccine component or latex?   No   Have you ever had a serious reaction after receiving a vaccination?   No   Do you have a long-term health problem with heart, lung, kidney, or metabolic disease (e.g., diabetes), asthma, a blood disorder, no spleen, complement component deficiency, a cochlear implant, or a spinal fluid leak?  Are you on long-term aspirin therapy?   No   Do you have cancer, leukemia, HIV/AIDS, or any other immune system problem?   No   Do you have a parent, brother, or sister with an immune system problem?   No   In the past 3 months, have you taken medications that affect  your immune system, such as prednisone, other steroids, or anticancer drugs; drugs for the treatment of rheumatoid arthritis, Crohn s disease, or psoriasis; or have you had radiation treatments?   No   Have you had a seizure, or a brain or other nervous system problem?   No   During the past year, have you received a transfusion of blood or blood    products, or been given immune (gamma) globulin or antiviral drug?   No   For women: Are you pregnant or is there a chance you could become       pregnant during the next month?   No   Have you received any vaccinations in the past 4 weeks?   No          Per orders of Dr. Gamboa, injection of tdap given by Preethi Magana CMA. Patient instructed to remain in clinic for 15 minutes afterwards, and to report any adverse reaction to me immediately.       Screening performed by Preethi Magana CMA on 4/27/2020 at 9:35 AM.

## 2020-04-27 NOTE — PROGRESS NOTES
Concerns: none  No vaginal bleeding, no contractions, no leakage of fluid  No nausea/vomiting. No heartburn  No vaginal discharge. No dysuria.   No headache, vision changes, lower extremity swelling, upper abdominal pain, chest pain, shortness of breath  Tdap today Discussed PTL, PROM, and when to call or come in.  Normal anatomy ultrasound.  RTC 4 weeks.  GTT and labs today   Virtual visit next in 4 weeks      Nigel Gamboa MD

## 2020-04-28 LAB — T PALLIDUM AB SER QL: NONREACTIVE

## 2020-05-26 ENCOUNTER — VIRTUAL VISIT (OUTPATIENT)
Dept: OBGYN | Facility: CLINIC | Age: 30
End: 2020-05-26
Payer: COMMERCIAL

## 2020-05-26 VITALS — WEIGHT: 189 LBS | BODY MASS INDEX: 31.69 KG/M2

## 2020-05-26 DIAGNOSIS — Z34.03 ENCOUNTER FOR PRENATAL CARE IN THIRD TRIMESTER OF FIRST PREGNANCY: Primary | ICD-10-CM

## 2020-05-26 PROCEDURE — 99207 ZZC PRENATAL VISIT: CPT | Performed by: OBSTETRICS & GYNECOLOGY

## 2020-05-26 NOTE — PROGRESS NOTES
"Eliz Mckinney is a 29 year old female who is being evaluated via a billable telephone visit.      The patient has been notified of following:     \"This telephone visit will be conducted via a call between you and your physician/provider. We have found that certain health care needs can be provided without the need for a physical exam.  This service lets us provide the care you need with a short phone conversation.  If a prescription is necessary we can send it directly to your pharmacy.  If lab work is needed we can place an order for that and you can then stop by our lab to have the test done at a later time.    Telephone visits are billed at different rates depending on your insurance coverage. During this emergency period, for some insurers they may be billed the same as an in-person visit.  Please reach out to your insurance provider with any questions.    If during the course of the call the physician/provider feels a telephone visit is not appropriate, you will not be charged for this service.\"    Patient has given verbal consent for Telephone visit?  Yes    What phone number would you like to be contacted at? [phone]    How would you like to obtain your AVS? MyChart    Phone call duration: 5 minutes    Tiana Stein MD    CC: Here for routinevirtual prenatal visit @ 31w5d   HPI:  She reprts normall FM, denies contractions; reviewed immportance of hydration, sun burn and bug bite prevention    PE: Wt 85.7 kg (189 lb)   LMP 10/15/2019   BMI 31.69 kg/m     See OB flowsheet      A:  There are no diagnoses linked to this encounter.    Routine prenatal care  RTC 2 weeks.      Tiana Stein M.D.       "
Family

## 2020-06-08 ENCOUNTER — PRENATAL OFFICE VISIT (OUTPATIENT)
Dept: OBGYN | Facility: CLINIC | Age: 30
End: 2020-06-08
Payer: COMMERCIAL

## 2020-06-08 VITALS
BODY MASS INDEX: 31.65 KG/M2 | TEMPERATURE: 98.7 F | DIASTOLIC BLOOD PRESSURE: 65 MMHG | SYSTOLIC BLOOD PRESSURE: 112 MMHG | HEART RATE: 78 BPM | RESPIRATION RATE: 16 BRPM | HEIGHT: 65 IN | WEIGHT: 190 LBS

## 2020-06-08 DIAGNOSIS — Z34.93 PRENATAL CARE, THIRD TRIMESTER: Primary | ICD-10-CM

## 2020-06-08 PROCEDURE — 99207 ZZC PRENATAL VISIT: CPT | Performed by: OBSTETRICS & GYNECOLOGY

## 2020-06-08 ASSESSMENT — MIFFLIN-ST. JEOR: SCORE: 1593.74

## 2020-06-08 NOTE — PROGRESS NOTES
"[facility]   OB/GYN Clinic     CC: Return OB      Subjective:     Eliz is a 29 year old  at 33w4d by 8w5d US who presents for return OB visit. +FM. No ctx, VB or LOF.      Objective:  /65 (BP Location: Right arm, Patient Position: Chair, Cuff Size: Adult Regular)   Pulse 78   Temp 98.7  F (37.1  C) (Tympanic)   Resp 16   Ht 1.645 m (5' 4.75\")   Wt 86.2 kg (190 lb)   LMP 10/15/2019   BMI 31.86 kg/m       Physical Exam:  Gen: Pleasant, talkative female in no apparent distress   Respiratory: breathing comfortably on room air   Cardiac: Warm and well-perfused.   GI: Abd soft and non-tender, gravid  MSK: Grossly normal movement of all four extremities  Psych: mood and affect bright   Lower extremity: edema not present      See OB flowsheet    Assessment/Plan:   29 year old  at 33w4d by 8w5d US who presents for return OB visit  Labs nl  GBS and cervix next visit    Reviewed labor and COVID precautions.  May wants elective IOL at 39wks, per new COVID guidelines. Reviewed steps of miso for unfavorable cervix and pitocin/AROM once favorable.     RTC in 2 wks as scheduled.      Linda Land MD  OB/GYN      "

## 2020-06-08 NOTE — NURSING NOTE
"Initial /65 (BP Location: Right arm, Patient Position: Chair, Cuff Size: Adult Regular)   Pulse 78   Temp 98.7  F (37.1  C) (Tympanic)   Resp 16   Ht 1.645 m (5' 4.75\")   Wt 86.2 kg (190 lb)   LMP 10/15/2019   BMI 31.86 kg/m   Estimated body mass index is 31.86 kg/m  as calculated from the following:    Height as of this encounter: 1.645 m (5' 4.75\").    Weight as of this encounter: 86.2 kg (190 lb). .      "

## 2020-06-23 ENCOUNTER — PRENATAL OFFICE VISIT (OUTPATIENT)
Dept: OBGYN | Facility: CLINIC | Age: 30
End: 2020-06-23
Payer: COMMERCIAL

## 2020-06-23 VITALS
SYSTOLIC BLOOD PRESSURE: 108 MMHG | HEIGHT: 65 IN | HEART RATE: 89 BPM | BODY MASS INDEX: 32.32 KG/M2 | TEMPERATURE: 98.4 F | DIASTOLIC BLOOD PRESSURE: 71 MMHG | RESPIRATION RATE: 18 BRPM | WEIGHT: 194 LBS

## 2020-06-23 DIAGNOSIS — Z34.03 ENCOUNTER FOR SUPERVISION OF NORMAL FIRST PREGNANCY IN THIRD TRIMESTER: Primary | ICD-10-CM

## 2020-06-23 PROCEDURE — 99207 ZZC PRENATAL VISIT: CPT | Performed by: OBSTETRICS & GYNECOLOGY

## 2020-06-23 PROCEDURE — 87653 STREP B DNA AMP PROBE: CPT | Performed by: OBSTETRICS & GYNECOLOGY

## 2020-06-23 ASSESSMENT — MIFFLIN-ST. JEOR: SCORE: 1611.89

## 2020-06-23 NOTE — PROGRESS NOTES
Concerns:   Doing well.  No concerns today.  No vaginal bleeding, LOF.  No contractions.  Cervix is not favorable for induction.  Cephalic position confirmed by Leopold maneuvers and cervical exam.  Reportable signs and symptoms discussed.  Discussed kick counts and fetal movement.  Discussed PTL, PROM, and when to call or come in.  RTC 2 weeks.  GBS cx  obtained    Nigel Gamboa MD

## 2020-06-23 NOTE — NURSING NOTE
"Initial /71 (BP Location: Right arm, Patient Position: Chair, Cuff Size: Adult Regular)   Pulse 89   Temp 98.4  F (36.9  C) (Tympanic)   Resp 18   Ht 1.645 m (5' 4.75\")   Wt 88 kg (194 lb)   LMP 10/15/2019   BMI 32.53 kg/m   Estimated body mass index is 32.53 kg/m  as calculated from the following:    Height as of this encounter: 1.645 m (5' 4.75\").    Weight as of this encounter: 88 kg (194 lb). .        "

## 2020-06-24 LAB
GP B STREP DNA SPEC QL NAA+PROBE: NEGATIVE
SPECIMEN SOURCE: NORMAL

## 2020-06-30 ENCOUNTER — PRENATAL OFFICE VISIT (OUTPATIENT)
Dept: OBGYN | Facility: CLINIC | Age: 30
End: 2020-06-30
Payer: COMMERCIAL

## 2020-06-30 VITALS
BODY MASS INDEX: 32.09 KG/M2 | DIASTOLIC BLOOD PRESSURE: 69 MMHG | WEIGHT: 192.6 LBS | TEMPERATURE: 98.2 F | RESPIRATION RATE: 18 BRPM | HEART RATE: 74 BPM | HEIGHT: 65 IN | SYSTOLIC BLOOD PRESSURE: 121 MMHG

## 2020-06-30 DIAGNOSIS — Z34.03 ENCOUNTER FOR PRENATAL CARE IN THIRD TRIMESTER OF FIRST PREGNANCY: Primary | ICD-10-CM

## 2020-06-30 PROBLEM — Z34.00 PRENATAL CARE, FIRST PREGNANCY: Status: ACTIVE | Noted: 2019-12-03

## 2020-06-30 PROCEDURE — 99207 ZZC PRENATAL VISIT: CPT | Performed by: OBSTETRICS & GYNECOLOGY

## 2020-06-30 ASSESSMENT — MIFFLIN-ST. JEOR: SCORE: 1605.54

## 2020-06-30 NOTE — PROGRESS NOTES
"CC: Here for routine prenatal visit @ 36w5d   HPI: + FM, no ctx, no LOF, no VB.  No complaints.     PE: /69 (BP Location: Right arm, Patient Position: Chair, Cuff Size: Adult Regular)   Pulse 74   Temp 98.2  F (36.8  C) (Tympanic)   Resp 18   Ht 1.645 m (5' 4.75\")   Wt 87.4 kg (192 lb 9.6 oz)   LMP 10/15/2019   Breastfeeding No   BMI 32.30 kg/m     See OB flowsheet    GBS negative  Cervix mid and medium    A/P G1 @ 36w5d normal pregnancy    1. Routine prenatal care.  Labor precautions reviewed.  COVID restrictions and recommendations reviewed including iron supplementation.     The patient and I discussed that she may need labor induction. You may need medications (either in the vagina or by mouth) to prepare the cervix for labor. Once your cervix is ready for labor, you may need a medication called pitocin. This is a synthetic version of the chemical oxytocin that your brain naturally makes to induce labor. The goal of this medication is to cause regular, painful contractions.  I discussed with the patient the risks, benefits, and alternative approaches; and the possible need for  birth. EFW is AGA.  The Labor Induction: What You Need to Know information sheet was made available to her. Questions and concerns were addressed and patient agrees to above if necessary during the course of her labor.     RTC 1 week    Shannon Kelly M.D.    "

## 2020-06-30 NOTE — NURSING NOTE
"Initial /69 (BP Location: Right arm, Patient Position: Chair, Cuff Size: Adult Regular)   Pulse 74   Temp 98.2  F (36.8  C) (Tympanic)   Resp 18   Ht 1.645 m (5' 4.75\")   Wt 87.4 kg (192 lb 9.6 oz)   LMP 10/15/2019   Breastfeeding No   BMI 32.30 kg/m   Estimated body mass index is 32.3 kg/m  as calculated from the following:    Height as of this encounter: 1.645 m (5' 4.75\").    Weight as of this encounter: 87.4 kg (192 lb 9.6 oz). .    Carla Wheatley CMA    "

## 2020-07-07 ENCOUNTER — PRENATAL OFFICE VISIT (OUTPATIENT)
Dept: OBGYN | Facility: CLINIC | Age: 30
End: 2020-07-07
Payer: COMMERCIAL

## 2020-07-07 VITALS
RESPIRATION RATE: 16 BRPM | TEMPERATURE: 99.1 F | WEIGHT: 193.6 LBS | DIASTOLIC BLOOD PRESSURE: 72 MMHG | SYSTOLIC BLOOD PRESSURE: 122 MMHG | HEIGHT: 65 IN | HEART RATE: 91 BPM | BODY MASS INDEX: 32.26 KG/M2

## 2020-07-07 DIAGNOSIS — Z34.03 ENCOUNTER FOR PRENATAL CARE IN THIRD TRIMESTER OF FIRST PREGNANCY: Primary | ICD-10-CM

## 2020-07-07 PROCEDURE — 99207 ZZC PRENATAL VISIT: CPT | Performed by: OBSTETRICS & GYNECOLOGY

## 2020-07-07 ASSESSMENT — MIFFLIN-ST. JEOR: SCORE: 1610.07

## 2020-07-07 NOTE — PROGRESS NOTES
"CC: Here for routine prenatal visit @ 37w5d   HPI: + FM, no ctx, no LOF, no VB.  No complaints.     PE: /72 (BP Location: Left arm, Patient Position: Chair, Cuff Size: Adult Regular)   Pulse 91   Temp 99.1  F (37.3  C) (Tympanic)   Resp 16   Ht 1.645 m (5' 4.75\")   Wt 87.8 kg (193 lb 9.6 oz)   LMP 10/15/2019   Breastfeeding No   BMI 32.47 kg/m     See OB flowsheet    GBS negative    A/P G1 @ 37w5d normal pregnancy    1. Routine prenatal care.  Labor precautions reviewed.  COVID restrictions and recommendations reviewed including iron supplementation.     RTC 1 week    Shannon Kelly M.D.    "

## 2020-07-07 NOTE — NURSING NOTE
"Initial /72 (BP Location: Left arm, Patient Position: Chair, Cuff Size: Adult Regular)   Pulse 91   Resp 16   Ht 1.645 m (5' 4.75\")   Wt 87.8 kg (193 lb 9.6 oz)   LMP 10/15/2019   Breastfeeding No   BMI 32.47 kg/m   Estimated body mass index is 32.47 kg/m  as calculated from the following:    Height as of this encounter: 1.645 m (5' 4.75\").    Weight as of this encounter: 87.8 kg (193 lb 9.6 oz). .    Carla Wheatley, TENA    "

## 2020-07-14 ENCOUNTER — PRENATAL OFFICE VISIT (OUTPATIENT)
Dept: OBGYN | Facility: CLINIC | Age: 30
End: 2020-07-14
Payer: COMMERCIAL

## 2020-07-14 VITALS
HEIGHT: 65 IN | RESPIRATION RATE: 18 BRPM | DIASTOLIC BLOOD PRESSURE: 70 MMHG | BODY MASS INDEX: 31.99 KG/M2 | WEIGHT: 192 LBS | SYSTOLIC BLOOD PRESSURE: 117 MMHG | TEMPERATURE: 99.2 F | HEART RATE: 105 BPM

## 2020-07-14 DIAGNOSIS — Z34.03 ENCOUNTER FOR SUPERVISION OF NORMAL FIRST PREGNANCY IN THIRD TRIMESTER: Primary | ICD-10-CM

## 2020-07-14 PROCEDURE — 99207 ZZC PRENATAL VISIT: CPT | Performed by: OBSTETRICS & GYNECOLOGY

## 2020-07-14 ASSESSMENT — MIFFLIN-ST. JEOR: SCORE: 1602.82

## 2020-07-14 NOTE — NURSING NOTE
"Initial /70 (BP Location: Right arm, Patient Position: Chair, Cuff Size: Adult Regular)   Pulse 105   Temp 99.2  F (37.3  C) (Tympanic)   Resp 18   Ht 1.645 m (5' 4.75\")   Wt 87.1 kg (192 lb)   LMP 10/15/2019   BMI 32.20 kg/m   Estimated body mass index is 32.2 kg/m  as calculated from the following:    Height as of this encounter: 1.645 m (5' 4.75\").    Weight as of this encounter: 87.1 kg (192 lb). .      "

## 2020-07-14 NOTE — PROGRESS NOTES
Concerns: having some B-H contractions    Doing well.  No concerns today.  No vaginal bleeding, LOF, contractions.  No HA, RUQ pain, N/V, visual changes.  Discussed indications, risks, complications and failure rate of inductions.  Reportable signs and symptoms discussed.  Labor precautions discussed.  RTC 1 week.  Guan's score of 7    Nigel Gamboa MD

## 2020-07-21 ENCOUNTER — PRENATAL OFFICE VISIT (OUTPATIENT)
Dept: OBGYN | Facility: CLINIC | Age: 30
End: 2020-07-21
Payer: COMMERCIAL

## 2020-07-21 VITALS
DIASTOLIC BLOOD PRESSURE: 70 MMHG | HEART RATE: 70 BPM | WEIGHT: 191.2 LBS | SYSTOLIC BLOOD PRESSURE: 111 MMHG | BODY MASS INDEX: 31.86 KG/M2 | RESPIRATION RATE: 18 BRPM | HEIGHT: 65 IN | TEMPERATURE: 98.4 F

## 2020-07-21 DIAGNOSIS — Z34.03 ENCOUNTER FOR PRENATAL CARE IN THIRD TRIMESTER OF FIRST PREGNANCY: Primary | ICD-10-CM

## 2020-07-21 PROCEDURE — 99207 ZZC PRENATAL VISIT: CPT | Performed by: OBSTETRICS & GYNECOLOGY

## 2020-07-21 ASSESSMENT — MIFFLIN-ST. JEOR: SCORE: 1599.19

## 2020-07-21 NOTE — PROGRESS NOTES
"CC: Here for routine prenatal visit @ 39w5d   HPI: + FM, no ctx, no LOF, no VB.  No complaints.     PE: /70 (BP Location: Right arm, Patient Position: Chair, Cuff Size: Adult Regular)   Pulse 70   Temp 98.4  F (36.9  C) (Tympanic)   Resp 18   Ht 1.645 m (5' 4.75\")   Wt 86.7 kg (191 lb 3.2 oz)   LMP 10/15/2019   Breastfeeding No   BMI 32.06 kg/m     See OB flowsheet    Cervix mid and soft    A/P G1 @ 39w5d normal pregnancy    1. Routine prenatal care.  Based on current cervical exam, would need cervical ripening if induced for post-term.  Discussed IOL ~ 41 weeks.  Reassess end of this week.  COVID restrictions and recommendations reviewed including iron supplementation.       Shannon Kelly M.D.    "

## 2020-07-21 NOTE — NURSING NOTE
"Initial /70 (BP Location: Right arm, Patient Position: Chair, Cuff Size: Adult Regular)   Pulse 70   Temp 98.4  F (36.9  C) (Tympanic)   Resp 18   Ht 1.645 m (5' 4.75\")   Wt 86.7 kg (191 lb 3.2 oz)   LMP 10/15/2019   Breastfeeding No   BMI 32.06 kg/m   Estimated body mass index is 32.06 kg/m  as calculated from the following:    Height as of this encounter: 1.645 m (5' 4.75\").    Weight as of this encounter: 86.7 kg (191 lb 3.2 oz). .    Carla Wheatley CMA    "

## 2020-07-23 ENCOUNTER — PRENATAL OFFICE VISIT (OUTPATIENT)
Dept: OBGYN | Facility: CLINIC | Age: 30
End: 2020-07-23
Payer: COMMERCIAL

## 2020-07-23 VITALS
WEIGHT: 191.3 LBS | TEMPERATURE: 97.7 F | RESPIRATION RATE: 16 BRPM | HEIGHT: 65 IN | SYSTOLIC BLOOD PRESSURE: 110 MMHG | BODY MASS INDEX: 31.87 KG/M2 | HEART RATE: 76 BPM | DIASTOLIC BLOOD PRESSURE: 69 MMHG

## 2020-07-23 DIAGNOSIS — Z34.03 ENCOUNTER FOR SUPERVISION OF NORMAL FIRST PREGNANCY IN THIRD TRIMESTER: Primary | ICD-10-CM

## 2020-07-23 PROCEDURE — 99207 ZZC PRENATAL VISIT: CPT | Performed by: OBSTETRICS & GYNECOLOGY

## 2020-07-23 ASSESSMENT — MIFFLIN-ST. JEOR: SCORE: 1599.64

## 2020-07-23 NOTE — PROGRESS NOTES
Concerns: 40 weeks today, contractions last night   No vaginal bleeding, LOF, contractions.  No HA, RUQ pain, N/V, visual changes.  Cephalic position confirmed by Leopold maneuvers and cervical exam.  Discussed signs of labor and when to call or come in.  Reportable signs and symptoms discussed.  Labor precautions discussed.  RTC 1 week.    Nigel Gamboa MD

## 2020-07-23 NOTE — NURSING NOTE
"Initial /69 (BP Location: Right arm, Patient Position: Chair, Cuff Size: Adult Regular)   Pulse 76   Temp 97.7  F (36.5  C) (Tympanic)   Resp 16   Ht 1.645 m (5' 4.75\")   Wt 86.8 kg (191 lb 4.8 oz)   LMP 10/15/2019   BMI 32.08 kg/m   Estimated body mass index is 32.08 kg/m  as calculated from the following:    Height as of this encounter: 1.645 m (5' 4.75\").    Weight as of this encounter: 86.8 kg (191 lb 4.8 oz). .      "

## 2020-07-24 ENCOUNTER — ANESTHESIA (OUTPATIENT)
Dept: OBGYN | Facility: CLINIC | Age: 30
End: 2020-07-24
Payer: COMMERCIAL

## 2020-07-24 ENCOUNTER — ANESTHESIA EVENT (OUTPATIENT)
Dept: OBGYN | Facility: CLINIC | Age: 30
End: 2020-07-24
Payer: COMMERCIAL

## 2020-07-24 ENCOUNTER — HOSPITAL ENCOUNTER (INPATIENT)
Facility: CLINIC | Age: 30
LOS: 3 days | Discharge: HOME | End: 2020-07-27
Attending: OBSTETRICS & GYNECOLOGY | Admitting: OBSTETRICS & GYNECOLOGY
Payer: COMMERCIAL

## 2020-07-24 PROBLEM — Z37.9 NORMAL LABOR: Status: ACTIVE | Noted: 2020-07-24

## 2020-07-24 LAB
ABO + RH BLD: NORMAL
ABO + RH BLD: NORMAL
BLD GP AB SCN SERPL QL: NORMAL
BLOOD BANK CMNT PATIENT-IMP: NORMAL
LABORATORY COMMENT REPORT: NORMAL
SARS-COV-2 RNA SPEC QL NAA+PROBE: NEGATIVE
SARS-COV-2 RNA SPEC QL NAA+PROBE: NORMAL
SPECIMEN EXP DATE BLD: NORMAL
SPECIMEN SOURCE: NORMAL
SPECIMEN SOURCE: NORMAL

## 2020-07-24 PROCEDURE — 10907ZC DRAINAGE OF AMNIOTIC FLUID, THERAPEUTIC FROM PRODUCTS OF CONCEPTION, VIA NATURAL OR ARTIFICIAL OPENING: ICD-10-PCS | Performed by: OBSTETRICS & GYNECOLOGY

## 2020-07-24 PROCEDURE — 3E0R3BZ INTRODUCTION OF ANESTHETIC AGENT INTO SPINAL CANAL, PERCUTANEOUS APPROACH: ICD-10-PCS | Performed by: OBSTETRICS & GYNECOLOGY

## 2020-07-24 PROCEDURE — 36415 COLL VENOUS BLD VENIPUNCTURE: CPT | Performed by: OBSTETRICS & GYNECOLOGY

## 2020-07-24 PROCEDURE — 12000000 ZZH R&B MED SURG/OB

## 2020-07-24 PROCEDURE — 86780 TREPONEMA PALLIDUM: CPT | Performed by: OBSTETRICS & GYNECOLOGY

## 2020-07-24 PROCEDURE — 40000671 ZZH STATISTIC ANESTHESIA CASE

## 2020-07-24 PROCEDURE — 25000125 ZZHC RX 250: Performed by: OBSTETRICS & GYNECOLOGY

## 2020-07-24 PROCEDURE — U0003 INFECTIOUS AGENT DETECTION BY NUCLEIC ACID (DNA OR RNA); SEVERE ACUTE RESPIRATORY SYNDROME CORONAVIRUS 2 (SARS-COV-2) (CORONAVIRUS DISEASE [COVID-19]), AMPLIFIED PROBE TECHNIQUE, MAKING USE OF HIGH THROUGHPUT TECHNOLOGIES AS DESCRIBED BY CMS-2020-01-R: HCPCS | Performed by: OBSTETRICS & GYNECOLOGY

## 2020-07-24 PROCEDURE — 25000125 ZZHC RX 250: Performed by: NURSE ANESTHETIST, CERTIFIED REGISTERED

## 2020-07-24 PROCEDURE — 37000011 ZZH ANESTHESIA WARD SERVICE: Performed by: NURSE ANESTHETIST, CERTIFIED REGISTERED

## 2020-07-24 PROCEDURE — 25800030 ZZH RX IP 258 OP 636: Performed by: OBSTETRICS & GYNECOLOGY

## 2020-07-24 PROCEDURE — 86900 BLOOD TYPING SEROLOGIC ABO: CPT | Performed by: OBSTETRICS & GYNECOLOGY

## 2020-07-24 PROCEDURE — 86850 RBC ANTIBODY SCREEN: CPT | Performed by: OBSTETRICS & GYNECOLOGY

## 2020-07-24 PROCEDURE — 00HU33Z INSERTION OF INFUSION DEVICE INTO SPINAL CANAL, PERCUTANEOUS APPROACH: ICD-10-PCS | Performed by: OBSTETRICS & GYNECOLOGY

## 2020-07-24 PROCEDURE — 86901 BLOOD TYPING SEROLOGIC RH(D): CPT | Performed by: OBSTETRICS & GYNECOLOGY

## 2020-07-24 PROCEDURE — 25000128 H RX IP 250 OP 636: Performed by: NURSE ANESTHETIST, CERTIFIED REGISTERED

## 2020-07-24 RX ORDER — TERBUTALINE SULFATE 1 MG/ML
0.25 INJECTION, SOLUTION SUBCUTANEOUS
Status: DISCONTINUED | OUTPATIENT
Start: 2020-07-24 | End: 2020-07-25

## 2020-07-24 RX ORDER — TRANEXAMIC ACID 10 MG/ML
1 INJECTION, SOLUTION INTRAVENOUS EVERY 30 MIN PRN
Status: DISCONTINUED | OUTPATIENT
Start: 2020-07-24 | End: 2020-07-25

## 2020-07-24 RX ORDER — OXYTOCIN/0.9 % SODIUM CHLORIDE 30/500 ML
100-340 PLASTIC BAG, INJECTION (ML) INTRAVENOUS CONTINUOUS PRN
Status: COMPLETED | OUTPATIENT
Start: 2020-07-24 | End: 2020-07-25

## 2020-07-24 RX ORDER — LIDOCAINE HYDROCHLORIDE 10 MG/ML
INJECTION, SOLUTION INFILTRATION; PERINEURAL PRN
Status: DISCONTINUED | OUTPATIENT
Start: 2020-07-24 | End: 2020-07-24

## 2020-07-24 RX ORDER — SODIUM CHLORIDE, SODIUM LACTATE, POTASSIUM CHLORIDE, CALCIUM CHLORIDE 600; 310; 30; 20 MG/100ML; MG/100ML; MG/100ML; MG/100ML
INJECTION, SOLUTION INTRAVENOUS CONTINUOUS
Status: DISCONTINUED | OUTPATIENT
Start: 2020-07-24 | End: 2020-07-25

## 2020-07-24 RX ORDER — IBUPROFEN 800 MG/1
800 TABLET, FILM COATED ORAL
Status: DISCONTINUED | OUTPATIENT
Start: 2020-07-24 | End: 2020-07-25

## 2020-07-24 RX ORDER — ONDANSETRON 2 MG/ML
4 INJECTION INTRAMUSCULAR; INTRAVENOUS EVERY 6 HOURS PRN
Status: DISCONTINUED | OUTPATIENT
Start: 2020-07-24 | End: 2020-07-25

## 2020-07-24 RX ORDER — SODIUM CHLORIDE 9 MG/ML
INJECTION, SOLUTION INTRAVENOUS CONTINUOUS
Status: DISCONTINUED | OUTPATIENT
Start: 2020-07-25 | End: 2020-07-25

## 2020-07-24 RX ORDER — OXYTOCIN 10 [USP'U]/ML
10 INJECTION, SOLUTION INTRAMUSCULAR; INTRAVENOUS
Status: DISCONTINUED | OUTPATIENT
Start: 2020-07-24 | End: 2020-07-25

## 2020-07-24 RX ORDER — LIDOCAINE HYDROCHLORIDE AND EPINEPHRINE 15; 5 MG/ML; UG/ML
INJECTION, SOLUTION EPIDURAL PRN
Status: DISCONTINUED | OUTPATIENT
Start: 2020-07-24 | End: 2020-07-24

## 2020-07-24 RX ORDER — ACETAMINOPHEN 325 MG/1
650 TABLET ORAL EVERY 4 HOURS PRN
Status: DISCONTINUED | OUTPATIENT
Start: 2020-07-24 | End: 2020-07-25

## 2020-07-24 RX ORDER — OXYTOCIN/0.9 % SODIUM CHLORIDE 30/500 ML
1-24 PLASTIC BAG, INJECTION (ML) INTRAVENOUS CONTINUOUS
Status: DISCONTINUED | OUTPATIENT
Start: 2020-07-24 | End: 2020-07-25

## 2020-07-24 RX ORDER — EPHEDRINE SULFATE 50 MG/ML
5 INJECTION, SOLUTION INTRAMUSCULAR; INTRAVENOUS; SUBCUTANEOUS
Status: DISCONTINUED | OUTPATIENT
Start: 2020-07-24 | End: 2020-07-25

## 2020-07-24 RX ORDER — CARBOPROST TROMETHAMINE 250 UG/ML
250 INJECTION, SOLUTION INTRAMUSCULAR
Status: COMPLETED | OUTPATIENT
Start: 2020-07-24 | End: 2020-07-25

## 2020-07-24 RX ORDER — LIDOCAINE 40 MG/G
CREAM TOPICAL
Status: DISCONTINUED | OUTPATIENT
Start: 2020-07-24 | End: 2020-07-25

## 2020-07-24 RX ORDER — METHYLERGONOVINE MALEATE 0.2 MG/ML
200 INJECTION INTRAVENOUS
Status: COMPLETED | OUTPATIENT
Start: 2020-07-24 | End: 2020-07-25

## 2020-07-24 RX ORDER — NALOXONE HYDROCHLORIDE 0.4 MG/ML
.1-.4 INJECTION, SOLUTION INTRAMUSCULAR; INTRAVENOUS; SUBCUTANEOUS
Status: DISCONTINUED | OUTPATIENT
Start: 2020-07-24 | End: 2020-07-25

## 2020-07-24 RX ORDER — OXYCODONE AND ACETAMINOPHEN 5; 325 MG/1; MG/1
1 TABLET ORAL
Status: DISCONTINUED | OUTPATIENT
Start: 2020-07-24 | End: 2020-07-25

## 2020-07-24 RX ORDER — CITRIC ACID/SODIUM CITRATE 334-500MG
30 SOLUTION, ORAL ORAL ONCE
Status: DISCONTINUED | OUTPATIENT
Start: 2020-07-24 | End: 2020-07-25

## 2020-07-24 RX ORDER — NALBUPHINE HYDROCHLORIDE 10 MG/ML
2.5-5 INJECTION, SOLUTION INTRAMUSCULAR; INTRAVENOUS; SUBCUTANEOUS EVERY 6 HOURS PRN
Status: DISCONTINUED | OUTPATIENT
Start: 2020-07-24 | End: 2020-07-25

## 2020-07-24 RX ADMIN — LIDOCAINE HYDROCHLORIDE 50 MG: 10 INJECTION, SOLUTION INFILTRATION; PERINEURAL at 16:10

## 2020-07-24 RX ADMIN — SODIUM CHLORIDE, POTASSIUM CHLORIDE, SODIUM LACTATE AND CALCIUM CHLORIDE: 600; 310; 30; 20 INJECTION, SOLUTION INTRAVENOUS at 21:50

## 2020-07-24 RX ADMIN — Medication 10 ML/HR: at 16:19

## 2020-07-24 RX ADMIN — Medication 8 ML: at 16:17

## 2020-07-24 RX ADMIN — Medication 2 MILLI-UNITS/MIN: at 23:44

## 2020-07-24 RX ADMIN — LIDOCAINE HYDROCHLORIDE AND EPINEPHRINE 45 MG: 15; 5 INJECTION, SOLUTION EPIDURAL at 16:14

## 2020-07-24 RX ADMIN — LIDOCAINE HYDROCHLORIDE AND EPINEPHRINE 30 MG: 15; 5 INJECTION, SOLUTION EPIDURAL at 16:15

## 2020-07-24 RX ADMIN — SODIUM CHLORIDE, POTASSIUM CHLORIDE, SODIUM LACTATE AND CALCIUM CHLORIDE 1000 ML: 600; 310; 30; 20 INJECTION, SOLUTION INTRAVENOUS at 15:37

## 2020-07-24 RX ADMIN — SODIUM CHLORIDE, POTASSIUM CHLORIDE, SODIUM LACTATE AND CALCIUM CHLORIDE: 600; 310; 30; 20 INJECTION, SOLUTION INTRAVENOUS at 16:33

## 2020-07-24 ASSESSMENT — MIFFLIN-ST. JEOR: SCORE: 1595.88

## 2020-07-24 NOTE — ANESTHESIA PREPROCEDURE EVALUATION
Anesthesia Pre-Procedure Evaluation    Patient: Eliz Mckinney   MRN: [id number] : 1990          Preoperative Diagnosis: * No surgery found *        Past Medical History:   Diagnosis Date     Chickenpox      Gastritis     in high school     History of urinary tract infection      Other specified gastritis without mention of hemorrhage 3/19/2008     Past Surgical History:   Procedure Laterality Date     NO HISTORY OF SURGERY         Anesthesia Evaluation       history and physical reviewed .             ROS/MED HX    ENT/Pulmonary:  - neg pulmonary ROS     Neurologic:  - neg neurologic ROS     Cardiovascular:  - neg cardiovascular ROS       METS/Exercise Tolerance:     Hematologic:         Musculoskeletal:         GI/Hepatic:  - neg GI/hepatic ROS       Renal/Genitourinary:         Endo:         Psychiatric:         Infectious Disease:         Malignancy:         Other:                     neg OB ROS            Physical Exam  Normal systems: cardiovascular, pulmonary and dental    Airway   Mallampati: II  TM distance: > 3 FB  Neck ROM: full  Mouth opening: > 3 cm    Dental     Cardiovascular       Pulmonary             Lab Results   Component Value Date    WBC 8.4 2020    HGB 10.9 (L) 2020    HCT 32.2 (L) 2020     2020     (H) 2008    POTASSIUM 4.3 2008    CHLORIDE 108 2008    CO2 26 2008    BUN 13 2008    CR 0.71 2008    GLC 71 2008    COLIN 9.2 2008    ALBUMIN 4.4 2007    PROTTOTAL 7.6 2007    ALT 69 (H) 2007    AST 62 (H) 2007    ALKPHOS 72 2007    BILITOTAL 0.3 2007    TSH 1.17 2017    T4 0.94 2017    HCG  10/09/2009     Negative   This test provides a presumptive diagnosis of pregnancy or non-pregnancy. A   confirmed pregnancy diagnosis should only be made by a physician after all   clinical and laboratory findings have been evaluated.    HCGS Negative 2017  "      Preop Vitals  BP Readings from Last 3 Encounters:   07/24/20 119/78   07/23/20 110/69   07/21/20 111/70    Pulse Readings from Last 3 Encounters:   07/24/20 85   07/23/20 76   07/21/20 70      Resp Readings from Last 3 Encounters:   07/24/20 18   07/23/20 16   07/21/20 18    SpO2 Readings from Last 3 Encounters:   07/15/19 99%   05/09/19 97%   04/03/19 98%      Temp Readings from Last 1 Encounters:   07/24/20 36.8  C (98.3  F) (Oral)    Ht Readings from Last 1 Encounters:   07/24/20 1.651 m (5' 5\")      Wt Readings from Last 1 Encounters:   07/24/20 86 kg (189 lb 9.5 oz)    Estimated body mass index is 31.55 kg/m  as calculated from the following:    Height as of this encounter: 1.651 m (5' 5\").    Weight as of this encounter: 86 kg (189 lb 9.5 oz).       Anesthesia Plan      History & Physical Review      ASA Status:  .  OB Epidural Asa: 2            Postoperative Care      Consents  Anesthetic plan, risks, benefits and alternatives discussed with:  Patient..                 Mary Canada CRNA, APRN CRNA  "

## 2020-07-24 NOTE — PLAN OF CARE
Pt comfortable after epidural. Category 2 tracing 120 moderate variability, +accels and intermittent variable decels. Charlee every 3-7 minutes lasting  seconds palpating moderate. Last SVE 5+cm, 85%-2 station. Small amount of mec fluid along with old brown blood noted.

## 2020-07-24 NOTE — H&P
[facility] Labor and Delivery H&P  2020  Eliz Mckinney  [id number]      HPI: Eliz Mckinney is a 29 year old  at 40w1d here for rule out labor.  She states that she is feeling well today. Contractions began increasing this morning at 1030  + FM, VB, or LOF.  She denies fever, HA, scotoma, nausea, vomiting, CP, SOB, RUQ pain, constipation, diarrhea, and acute swelling.        Pregnancy notable for:  -- family hx of spina bifida    OBHX:   OB History    Para Term  AB Living   1 0 0 0 0 0   SAB TAB Ectopic Multiple Live Births   0 0 0 0 0      # Outcome Date GA Lbr Krishan/2nd Weight Sex Delivery Anes PTL Lv   1 Current                MedicalHX:   Past Medical History:   Diagnosis Date     Chickenpox      Gastritis     in high school     History of urinary tract infection      Other specified gastritis without mention of hemorrhage 3/19/2008       SurgicalHX:   Past Surgical History:   Procedure Laterality Date     NO HISTORY OF SURGERY         Medications:   No current facility-administered medications on file prior to encounter.   ESCITALOPRAM OXALATE PO, Take 10 mg by mouth daily  hydrOXYzine (ATARAX) 25 MG tablet, Take 1 tablet (25 mg) by mouth 3 times daily as needed for anxiety  Misc. Devices (BREAST PUMP) MISC, 1 each daily  Prenatal MV-Min-Fe Fum-FA-DHA (PRENATAL+DHA PO), Take 1 tablet by mouth daily        Allergies:  Allergies   Allergen Reactions     Nkda [No Known Drug Allergies]        FamilyHX:  Family History   Problem Relation Age of Onset     Diabetes Mother      Depression Mother      Hypertension Mother      Depression Father      Substance Abuse Brother      Depression Brother      Depression Brother      Cancer Maternal Grandfather         bladder/kidney     Depression Maternal Grandfather      Depression Paternal Grandmother      Depression Paternal Grandfather      Spina bifida Brother        SocialHX:  Neg x3   ROS: 10-point ROS negative except as in HPI      Physical Exam:  Vitals:    20 1244   BP: 116/75     GEN: resting comfortably in bed, NAD   CV: RRR, no murmurs  PULM: CTAB, no increased work of breathing, no cough/wheeze   ABD: soft, gravid, non-tender, non-distended  EXT: trace edema, non tender to palpation  CVX: 3-4/80/-2     NST:  FHT: baseline 150s  Moderate variability, + accels, no decels  TOCO: -2/10    Labs:   CBC, RPR, T&S     Lab Results   Component Value Date    ABO AB 2019    RH Pos 2019    AS Neg 2019    HEPBANG Nonreactive 2019    CHPCRT Negative 2019    GCPCRT Negative 2019    HGB 10.9 (L) 2020       GBS Status:   Lab Results   Component Value Date    GBS Negative 2020       Lab Results   Component Value Date    PAP NIL 10/13/2017       A/P: Eliz Mckinney is a 29 year old female  at 40w1d here for rule out labor. Patient favorable on exam and agreeable to AROM for augmentation. AROM completed with return of meconium stained fluid. Will add pitocin as needed.     Admit to L&D. Place PIV. Draw labs: T&S, CBC, RPR   Labor: Anticipate   FWB: Category 1 FHT.  Continue EFM and toco  Pain: Desires epidural for analgesia  PNC: Rh + , Rubella imm, GBS neg    Pepper Stark MD   OB/GYN   2020 1:27 PM

## 2020-07-24 NOTE — PLAN OF CARE
Pt [facility] at 1230 with c/o regular contractions since 0400 today. Oriented to room and procedures.  present and supportive. EUM/EFM applied. Category I FHR tracing obtained. VSS. MD notified of pt arrival and orders obtained. SVE and AROM by MD at 1352. Small amt of mec stained fluid returned after AROM. Plans epidural for pain control when becomes uncomfortable and discussed possible augmentation with pitocin if needed. Pt agreeable with plan of care. Will continue to monitor.

## 2020-07-24 NOTE — PLAN OF CARE
Late Entry:    Mary Canada CRNA called and in room at 1600. Patient and procedure correctly identified/verified with CRNA. Time out completed. Consent signed. 1000cc fluid bolus given. Patient in position for epidural placement. Epidural placed without complications. Test dose/bolus given by CRNA and patient tolerated well. Patient rated her pain as 9/10 prior to epidural and after medication administration, pain level 1/10. Ephedrine was Not given at this time .

## 2020-07-24 NOTE — PLAN OF CARE
Assuming care of pt. Pt rating pain 8-9/10 requesting her epidural. IV bolus started. Will notify anesthesia.

## 2020-07-25 LAB
ERYTHROCYTE [DISTWIDTH] IN BLOOD BY AUTOMATED COUNT: 12.9 % (ref 10–15)
FIBRINOGEN PPP-MCNC: 492 MG/DL (ref 200–420)
HCT VFR BLD AUTO: 31.6 % (ref 35–47)
HGB BLD-MCNC: 10.8 G/DL (ref 11.7–15.7)
MCH RBC QN AUTO: 31.9 PG (ref 26.5–33)
MCHC RBC AUTO-ENTMCNC: 34.2 G/DL (ref 31.5–36.5)
MCV RBC AUTO: 93 FL (ref 78–100)
PLATELET # BLD AUTO: 160 10E9/L (ref 150–450)
RBC # BLD AUTO: 3.39 10E12/L (ref 3.8–5.2)
T PALLIDUM AB SER QL: NONREACTIVE
WBC # BLD AUTO: 18.4 10E9/L (ref 4–11)

## 2020-07-25 PROCEDURE — 12000000 ZZH R&B MED SURG/OB

## 2020-07-25 PROCEDURE — 25800030 ZZH RX IP 258 OP 636: Performed by: OBSTETRICS & GYNECOLOGY

## 2020-07-25 PROCEDURE — 0UQGXZZ REPAIR VAGINA, EXTERNAL APPROACH: ICD-10-PCS | Performed by: OBSTETRICS & GYNECOLOGY

## 2020-07-25 PROCEDURE — 59400 OBSTETRICAL CARE: CPT | Performed by: OBSTETRICS & GYNECOLOGY

## 2020-07-25 PROCEDURE — 0UQMXZZ REPAIR VULVA, EXTERNAL APPROACH: ICD-10-PCS | Performed by: OBSTETRICS & GYNECOLOGY

## 2020-07-25 PROCEDURE — 25000125 ZZHC RX 250: Performed by: OBSTETRICS & GYNECOLOGY

## 2020-07-25 PROCEDURE — 36415 COLL VENOUS BLD VENIPUNCTURE: CPT | Performed by: OBSTETRICS & GYNECOLOGY

## 2020-07-25 PROCEDURE — 25000128 H RX IP 250 OP 636

## 2020-07-25 PROCEDURE — 25000128 H RX IP 250 OP 636: Performed by: NURSE ANESTHETIST, CERTIFIED REGISTERED

## 2020-07-25 PROCEDURE — 85384 FIBRINOGEN ACTIVITY: CPT | Performed by: OBSTETRICS & GYNECOLOGY

## 2020-07-25 PROCEDURE — 85027 COMPLETE CBC AUTOMATED: CPT | Performed by: OBSTETRICS & GYNECOLOGY

## 2020-07-25 PROCEDURE — 0UC97ZZ EXTIRPATION OF MATTER FROM UTERUS, VIA NATURAL OR ARTIFICIAL OPENING: ICD-10-PCS | Performed by: OBSTETRICS & GYNECOLOGY

## 2020-07-25 PROCEDURE — 72200001 ZZH LABOR CARE VAGINAL DELIVERY SINGLE

## 2020-07-25 PROCEDURE — 0KQM0ZZ REPAIR PERINEUM MUSCLE, OPEN APPROACH: ICD-10-PCS | Performed by: OBSTETRICS & GYNECOLOGY

## 2020-07-25 PROCEDURE — 25000128 H RX IP 250 OP 636: Performed by: OBSTETRICS & GYNECOLOGY

## 2020-07-25 PROCEDURE — 25000132 ZZH RX MED GY IP 250 OP 250 PS 637: Performed by: OBSTETRICS & GYNECOLOGY

## 2020-07-25 RX ORDER — CARBOPROST TROMETHAMINE 250 UG/ML
250 INJECTION, SOLUTION INTRAMUSCULAR
Status: DISCONTINUED | OUTPATIENT
Start: 2020-07-25 | End: 2020-07-27 | Stop reason: HOSPADM

## 2020-07-25 RX ORDER — MODIFIED LANOLIN
OINTMENT (GRAM) TOPICAL
Status: DISCONTINUED | OUTPATIENT
Start: 2020-07-25 | End: 2020-07-27 | Stop reason: HOSPADM

## 2020-07-25 RX ORDER — FENTANYL CITRATE 50 UG/ML
100 INJECTION, SOLUTION INTRAMUSCULAR; INTRAVENOUS ONCE
Status: COMPLETED | OUTPATIENT
Start: 2020-07-25 | End: 2020-07-25

## 2020-07-25 RX ORDER — AMOXICILLIN 250 MG
1 CAPSULE ORAL 2 TIMES DAILY
Status: DISCONTINUED | OUTPATIENT
Start: 2020-07-25 | End: 2020-07-27 | Stop reason: HOSPADM

## 2020-07-25 RX ORDER — OXYTOCIN/0.9 % SODIUM CHLORIDE 30/500 ML
100 PLASTIC BAG, INJECTION (ML) INTRAVENOUS CONTINUOUS
Status: DISCONTINUED | OUTPATIENT
Start: 2020-07-25 | End: 2020-07-27 | Stop reason: HOSPADM

## 2020-07-25 RX ORDER — OXYTOCIN 10 [USP'U]/ML
10 INJECTION, SOLUTION INTRAMUSCULAR; INTRAVENOUS
Status: DISCONTINUED | OUTPATIENT
Start: 2020-07-25 | End: 2020-07-27 | Stop reason: HOSPADM

## 2020-07-25 RX ORDER — ESCITALOPRAM OXALATE 10 MG/1
10 TABLET ORAL DAILY
Status: DISCONTINUED | OUTPATIENT
Start: 2020-07-25 | End: 2020-07-27 | Stop reason: HOSPADM

## 2020-07-25 RX ORDER — ACETAMINOPHEN 325 MG/1
650 TABLET ORAL EVERY 4 HOURS PRN
Status: DISCONTINUED | OUTPATIENT
Start: 2020-07-25 | End: 2020-07-27 | Stop reason: HOSPADM

## 2020-07-25 RX ORDER — LIDOCAINE HYDROCHLORIDE 10 MG/ML
INJECTION, SOLUTION EPIDURAL; INFILTRATION; INTRACAUDAL; PERINEURAL
Status: DISCONTINUED
Start: 2020-07-25 | End: 2020-07-25 | Stop reason: HOSPADM

## 2020-07-25 RX ORDER — MISOPROSTOL 200 UG/1
400 TABLET ORAL
Status: COMPLETED | OUTPATIENT
Start: 2020-07-25 | End: 2020-07-25

## 2020-07-25 RX ORDER — MISOPROSTOL 200 UG/1
TABLET ORAL
Status: DISCONTINUED
Start: 2020-07-25 | End: 2020-07-25 | Stop reason: HOSPADM

## 2020-07-25 RX ORDER — AMOXICILLIN 250 MG
2 CAPSULE ORAL 2 TIMES DAILY
Status: DISCONTINUED | OUTPATIENT
Start: 2020-07-25 | End: 2020-07-27 | Stop reason: HOSPADM

## 2020-07-25 RX ORDER — METHYLERGONOVINE MALEATE 0.2 MG/ML
200 INJECTION INTRAVENOUS
Status: DISCONTINUED | OUTPATIENT
Start: 2020-07-25 | End: 2020-07-27 | Stop reason: HOSPADM

## 2020-07-25 RX ORDER — BISACODYL 10 MG
10 SUPPOSITORY, RECTAL RECTAL DAILY PRN
Status: DISCONTINUED | OUTPATIENT
Start: 2020-07-27 | End: 2020-07-27 | Stop reason: HOSPADM

## 2020-07-25 RX ORDER — TRANEXAMIC ACID 10 MG/ML
1 INJECTION, SOLUTION INTRAVENOUS EVERY 30 MIN PRN
Status: DISCONTINUED | OUTPATIENT
Start: 2020-07-25 | End: 2020-07-27 | Stop reason: HOSPADM

## 2020-07-25 RX ORDER — FENTANYL CITRATE 50 UG/ML
INJECTION, SOLUTION INTRAMUSCULAR; INTRAVENOUS
Status: COMPLETED
Start: 2020-07-25 | End: 2020-07-25

## 2020-07-25 RX ORDER — MISOPROSTOL 200 UG/1
400 TABLET ORAL ONCE
Status: DISCONTINUED | OUTPATIENT
Start: 2020-07-25 | End: 2020-07-25 | Stop reason: CLARIF

## 2020-07-25 RX ORDER — HYDROCORTISONE 2.5 %
CREAM (GRAM) TOPICAL 3 TIMES DAILY PRN
Status: DISCONTINUED | OUTPATIENT
Start: 2020-07-25 | End: 2020-07-27 | Stop reason: HOSPADM

## 2020-07-25 RX ORDER — NALOXONE HYDROCHLORIDE 0.4 MG/ML
.1-.4 INJECTION, SOLUTION INTRAMUSCULAR; INTRAVENOUS; SUBCUTANEOUS
Status: DISCONTINUED | OUTPATIENT
Start: 2020-07-25 | End: 2020-07-27 | Stop reason: HOSPADM

## 2020-07-25 RX ORDER — IBUPROFEN 800 MG/1
800 TABLET, FILM COATED ORAL EVERY 6 HOURS PRN
Status: DISCONTINUED | OUTPATIENT
Start: 2020-07-25 | End: 2020-07-27 | Stop reason: HOSPADM

## 2020-07-25 RX ORDER — CEFAZOLIN SODIUM 2 G/100ML
2 INJECTION, SOLUTION INTRAVENOUS ONCE
Status: DISCONTINUED | OUTPATIENT
Start: 2020-07-25 | End: 2020-07-25

## 2020-07-25 RX ORDER — METHYLERGONOVINE MALEATE 0.2 MG/ML
INJECTION INTRAVENOUS
Status: DISCONTINUED
Start: 2020-07-25 | End: 2020-07-25 | Stop reason: HOSPADM

## 2020-07-25 RX ORDER — TRANEXAMIC ACID 10 MG/ML
INJECTION, SOLUTION INTRAVENOUS
Status: DISCONTINUED
Start: 2020-07-25 | End: 2020-07-25 | Stop reason: HOSPADM

## 2020-07-25 RX ORDER — OXYTOCIN/0.9 % SODIUM CHLORIDE 30/500 ML
340 PLASTIC BAG, INJECTION (ML) INTRAVENOUS CONTINUOUS PRN
Status: DISCONTINUED | OUTPATIENT
Start: 2020-07-25 | End: 2020-07-27 | Stop reason: HOSPADM

## 2020-07-25 RX ADMIN — METHYLERGONOVINE MALEATE 200 MCG: 0.2 INJECTION INTRAMUSCULAR; INTRAVENOUS at 04:24

## 2020-07-25 RX ADMIN — FENTANYL CITRATE 100 MCG: 50 INJECTION, SOLUTION INTRAMUSCULAR; INTRAVENOUS at 04:54

## 2020-07-25 RX ADMIN — DOCUSATE SODIUM 50 MG AND SENNOSIDES 8.6 MG 1 TABLET: 8.6; 5 TABLET, FILM COATED ORAL at 08:11

## 2020-07-25 RX ADMIN — DOCUSATE SODIUM 50 MG AND SENNOSIDES 8.6 MG 1 TABLET: 8.6; 5 TABLET, FILM COATED ORAL at 19:35

## 2020-07-25 RX ADMIN — Medication: at 00:15

## 2020-07-25 RX ADMIN — IBUPROFEN 800 MG: 800 TABLET ORAL at 18:22

## 2020-07-25 RX ADMIN — ACETAMINOPHEN 650 MG: 325 TABLET, FILM COATED ORAL at 14:45

## 2020-07-25 RX ADMIN — ESCITALOPRAM OXALATE 10 MG: 10 TABLET ORAL at 17:41

## 2020-07-25 RX ADMIN — MISOPROSTOL 400 MCG: 200 TABLET ORAL at 04:23

## 2020-07-25 RX ADMIN — Medication 340 ML/HR: at 04:22

## 2020-07-25 RX ADMIN — IBUPROFEN 800 MG: 800 TABLET ORAL at 10:57

## 2020-07-25 RX ADMIN — CARBOPROST TROMETHAMINE 250 MCG: 250 INJECTION, SOLUTION INTRAMUSCULAR at 04:28

## 2020-07-25 RX ADMIN — CEFAZOLIN SODIUM 2 G: 2 INJECTION, SOLUTION INTRAVENOUS at 06:28

## 2020-07-25 RX ADMIN — ONDANSETRON 4 MG: 2 INJECTION INTRAMUSCULAR; INTRAVENOUS at 05:10

## 2020-07-25 RX ADMIN — TRANEXAMIC ACID 1 G: 10 INJECTION, SOLUTION INTRAVENOUS at 04:25

## 2020-07-25 RX ADMIN — SODIUM CHLORIDE: 0.9 INJECTION, SOLUTION INTRAVENOUS at 00:11

## 2020-07-25 RX ADMIN — SODIUM CHLORIDE 250 ML: 9 INJECTION, SOLUTION INTRAVENOUS at 00:11

## 2020-07-25 RX ADMIN — SODIUM CHLORIDE, POTASSIUM CHLORIDE, SODIUM LACTATE AND CALCIUM CHLORIDE 1000 ML: 600; 310; 30; 20 INJECTION, SOLUTION INTRAVENOUS at 06:27

## 2020-07-25 RX ADMIN — Medication 2 MILLI-UNITS/MIN: at 02:37

## 2020-07-25 NOTE — ANESTHESIA POSTPROCEDURE EVALUATION
Patient: Eliz Mckinney    * No procedures listed *    Diagnosis:* No pre-op diagnosis entered *  Diagnosis Additional Information: No value filed.    Anesthesia Type:  No value filed.    Note:  Anesthesia Post Evaluation    Patient location during evaluation: Floor  Patient participation: Able to fully participate in evaluation  Level of consciousness: awake  Pain management: adequate  Airway patency: patent  Cardiovascular status: acceptable  Respiratory status: acceptable  Hydration status: acceptable  PONV: none     Anesthetic complications: None          Last vitals:  Vitals:    07/25/20 0600 07/25/20 0620 07/25/20 0730   BP: 113/73 107/70    Pulse:      Resp:   16   Temp:   37.1  C (98.7  F)   SpO2: 97% 97%          Electronically Signed By: NICOLE Demarco CRNA  July 25, 2020  11:17 AM

## 2020-07-25 NOTE — PLAN OF CARE
Viable female delivered VAVD over epis @ 0417 by Dr. Stark, terminal mec, dried & stimulated, spontaneous vigorous cry, delayed cord clamping performed, infant to mother's abd for bonding.  Boggy uterus reported by provider shortly following delivery, straight cath for 50ml; provider requested cytotec, methergine, TXA, & hemabate, which were given with good results. QBL following delivery 1160, an additional 100 noted PP.  Provider also ordered IV ancef x1 following the lengthy repair, & an IVF bolus of 500 ml which is running currently, following admin of PP pitocin.  Pt remains afebrile, & she did get up to void, tolerated very well; ambulated to 2042 in stable condition.  She intends to breastfeed, which was delayed r/t maternal status, initiated 0600, going fairly well.  FOB present & supportive, bonding well; infant rooming in with parents tonight.  Report given to oncoming RN, will continue to monitor & update as needed.

## 2020-07-25 NOTE — PLAN OF CARE
2340 SVE 7-8  2344 Dr. Stark updated; pitocin initiated. Decision to proceed with amnioinfusion d/t variables occurring now greater than 50% of contractions; FHT normal baseline, moderate variability.    0005 Dr. Stark at bedside, IUPC placed.  0011, amnioinfusion bolus infusing.  0045 Pads weighed, 82ml blood tinged fluid returned.    0057 Dr. Stark notified IUPC reading baseline at -2 despite troubleshooting mechanisms.  Requests continued use because it flushes easily. Variable decels resolving; pitocin increased for inadequate labor.  Pt & her  in agreement with plan of care; reassurance provided, questions answered.  Will continue to monitor & update as needed.

## 2020-07-25 NOTE — PROGRESS NOTES
Received notification that patient starting pitocin after no cervical change. Strip review with repetitive (>50% of contractions) variable decels. Continues to have moderate variability and accels. IUPC placed with plan for amnioinfusion. Discussed with patient reasons for placement and she was agreeable. Will continue to monitor. Did discuss with patient that if variables increase/worsen that she may need  section, pending labor progress (reaching complete, movement with pushes) and whether other reassuring signs remain (moderate variability). Patient states understanding and is in agreement with plan of care.    Pepper Stark MD   2020 12:13 AM

## 2020-07-25 NOTE — PLAN OF CARE
Problem: Adult Inpatient Plan of Care  Goal: Plan of Care Review  2020 0834 by Jenny Collins RN  Outcome: Improving    Data: Vital signs within normal limits. Postpartum checks within normal limits - see flow record. Patient eating and drinking normally. Patient able to empty bladder independently. . Patient ambulating independently..   No apparent signs of infection. Patient Is performing self cares and Is able to care for infant. Positive attachment behaviors are observed with infant. Support persons are present.  Action:  Pain plan was discussed. Patient will request pain med when she is ready for it.  medicated during the  See MAR.Patient education done about breastfeeding,  cares, and postpartum cares. See flow record.  Response:   Patient denies need for pain med   Plan: Anticipate discharge on 20 or 20.

## 2020-07-26 LAB — HGB BLD-MCNC: 8.8 G/DL (ref 11.7–15.7)

## 2020-07-26 PROCEDURE — 36415 COLL VENOUS BLD VENIPUNCTURE: CPT | Performed by: OBSTETRICS & GYNECOLOGY

## 2020-07-26 PROCEDURE — 12000000 ZZH R&B MED SURG/OB

## 2020-07-26 PROCEDURE — 25000132 ZZH RX MED GY IP 250 OP 250 PS 637: Performed by: OBSTETRICS & GYNECOLOGY

## 2020-07-26 PROCEDURE — 85018 HEMOGLOBIN: CPT | Performed by: OBSTETRICS & GYNECOLOGY

## 2020-07-26 RX ADMIN — IBUPROFEN 800 MG: 800 TABLET ORAL at 00:26

## 2020-07-26 RX ADMIN — ACETAMINOPHEN 650 MG: 325 TABLET, FILM COATED ORAL at 06:52

## 2020-07-26 RX ADMIN — ESCITALOPRAM OXALATE 10 MG: 10 TABLET ORAL at 17:51

## 2020-07-26 RX ADMIN — IBUPROFEN 800 MG: 800 TABLET ORAL at 19:34

## 2020-07-26 RX ADMIN — ACETAMINOPHEN 650 MG: 325 TABLET, FILM COATED ORAL at 19:34

## 2020-07-26 RX ADMIN — IBUPROFEN 800 MG: 800 TABLET ORAL at 06:52

## 2020-07-26 RX ADMIN — IBUPROFEN 800 MG: 800 TABLET ORAL at 14:13

## 2020-07-26 RX ADMIN — DOCUSATE SODIUM 50 MG AND SENNOSIDES 8.6 MG 1 TABLET: 8.6; 5 TABLET, FILM COATED ORAL at 19:34

## 2020-07-26 RX ADMIN — DOCUSATE SODIUM 50 MG AND SENNOSIDES 8.6 MG 1 TABLET: 8.6; 5 TABLET, FILM COATED ORAL at 07:29

## 2020-07-26 RX ADMIN — ACETAMINOPHEN 650 MG: 325 TABLET, FILM COATED ORAL at 00:42

## 2020-07-26 NOTE — PLAN OF CARE
Data: Vital signs within normal limits. Postpartum checks within normal limits - see flow record. Patient eating and drinking normally. Patient able to empty bladder independently and is up ambulating. No apparent signs of infection.  Laceration  healing well. Patient performing self cares and is able to care for infant.  Action: Patient medicated during the shift for pain. See MAR. Patient reassessed within 1 hour after each medication and pain was improved - patient stated she was comfortable. Patient education done about Breast feeding. See flow record.  Response: Positive attachment behaviors observed with infant. Support persons 1 present.   Plan: Anticipate discharge on 7/26/20.

## 2020-07-26 NOTE — PROGRESS NOTES
Pt vss, afebrile.  Small amt vaginal bleeding. Taking Ibup for discomfort.  Indep with self & infant cares.  Pt stable.

## 2020-07-26 NOTE — PROGRESS NOTES
"[facility] OB/GYN Daily Postpartum Note    S: Eliz Mckinney is feeling well this morning. She denies any complaints. Her pain is well-controlled on medications. She tolerating a regular diet without nausea or vomiting. Ambulating without difficulty. Lochia is decreasing. Breastfeeding without questions or concerns. She plans to use pops/condoms vs mirena for contraception.      O:    /70   Pulse 76   Temp 98.1  F (36.7  C) (Oral)   Resp 16   Ht 1.651 m (5' 5\")   Wt 86 kg (189 lb 9.5 oz)   LMP 10/15/2019   SpO2 97%   Breastfeeding Unknown   BMI 31.55 kg/m     General: resting in bed, in NAD  CV: reg rate, well perfused  Resp: no increased work of breathing  Abdomen: soft, appropriately tender, nondistended  Fundus firm below the umbilicus  Extremities: non-tender, non-edematous      A: Eliz Mckinney is a 29 year old  who is PPD#1 s/p VAVD c/b PPH requiring all uterotonics.    P:  Continue routine pp cares  Heme: appropriate drop to 8.8, bleeding has improved s/p single dose of ancef given uterine sweeps   GI: tolerating regular diet  Feeding: breast  Contraception: pops/condoms vs mirena  Rh positive, Rubella Immune  Disposition: routine PP cares, anticipate d/c tomorrow    Pepper Stark MD, MD  [facility] OB/GYN   2020 8:40 AM            "

## 2020-07-27 VITALS
SYSTOLIC BLOOD PRESSURE: 113 MMHG | DIASTOLIC BLOOD PRESSURE: 59 MMHG | HEART RATE: 75 BPM | HEIGHT: 65 IN | TEMPERATURE: 98.3 F | RESPIRATION RATE: 16 BRPM | BODY MASS INDEX: 31.59 KG/M2 | OXYGEN SATURATION: 97 % | WEIGHT: 189.6 LBS

## 2020-07-27 PROCEDURE — 25000132 ZZH RX MED GY IP 250 OP 250 PS 637: Performed by: OBSTETRICS & GYNECOLOGY

## 2020-07-27 RX ORDER — IBUPROFEN 200 MG
600 TABLET ORAL EVERY 6 HOURS PRN
COMMUNITY
Start: 2020-07-27 | End: 2020-09-10

## 2020-07-27 RX ADMIN — DOCUSATE SODIUM 50 MG AND SENNOSIDES 8.6 MG 1 TABLET: 8.6; 5 TABLET, FILM COATED ORAL at 07:39

## 2020-07-27 RX ADMIN — ACETAMINOPHEN 650 MG: 325 TABLET, FILM COATED ORAL at 00:21

## 2020-07-27 RX ADMIN — IBUPROFEN 800 MG: 800 TABLET ORAL at 03:51

## 2020-07-27 RX ADMIN — ACETAMINOPHEN 650 MG: 325 TABLET, FILM COATED ORAL at 03:59

## 2020-07-27 NOTE — DISCHARGE SUMMARY
[facility] Discharge Summary    Eliz Mckinney MRN# [id number]   Age: 29 year old YOB: 1990     Date of Admission:  7/24/2020  Date of Discharge::  7/27/2020  Admitting Physician:  Pepper Stark MD  Discharge Physician:  Nigel aGmboa MD     Home clinic: [facility]          Admission Diagnoses:   PREGNANCY  Normal labor  Vacuum extractor delivery, delivered   anemia       Discharge Diagnosis:   Intrauterine pregnancy at 40+2 weeks gestation  Vacuum extraction   -Exacerbation of chronic  anemia        Procedures:   Procedure(s): Repair of second degree perineal laceration  Vacuum extraction       No other procedures performed during this admission           Medications Prior to Admission:     Medications Prior to Admission   Medication Sig Dispense Refill Last Dose     ESCITALOPRAM OXALATE PO Take 10 mg by mouth daily        hydrOXYzine (ATARAX) 25 MG tablet Take 1 tablet (25 mg) by mouth 3 times daily as needed for anxiety        Misc. Devices (BREAST PUMP) MISC 1 each daily 1 each 0      Prenatal MV-Min-Fe Fum-FA-DHA (PRENATAL+DHA PO) Take 1 tablet by mouth daily                Discharge Medications:     Current Discharge Medication List      START taking these medications    Details   ibuprofen (ADVIL/MOTRIN) 200 MG tablet Take 3 tablets (600 mg) by mouth every 6 hours as needed for mild pain    Associated Diagnoses: Vacuum extractor delivery, delivered         CONTINUE these medications which have NOT CHANGED    Details   ESCITALOPRAM OXALATE PO Take 10 mg by mouth daily      hydrOXYzine (ATARAX) 25 MG tablet Take 1 tablet (25 mg) by mouth 3 times daily as needed for anxiety      Misc. Devices (BREAST PUMP) MISC 1 each daily  Qty: 1 each, Refills: 0    Associated Diagnoses: Encounter for prenatal care in second trimester of first pregnancy      Prenatal MV-Min-Fe Fum-FA-DHA (PRENATAL+DHA PO) Take 1 tablet by mouth daily                   Consultations:    No consultations were requested during this admission          Brief History of Labor:   Eliz Mckinney is a 29 year old  presented at 40w2d in early labor. AROM completed for augmentation. She progressed spontaneously to 7cm and then failed to make change and was started on pitocin. During this time, patient had intermittent variable decelerations. She progressed to complete with 6 mu pit and began pushing in the setting of several prolonged decels. She did continue to have moderate variability with good return to baseline though out this. With initial pushing tracing did improve and thus vacuum was discussed but not completed. After an additional approximately 30-45 minutes of pushing, tracing was again noted to have late and prolonged decels and thus vacuum delivery was then recommended. See below note for details but in short, over 4 contractions with 2 pop offs fetus was brought to 3-4+ station. Given pop offs and continued moderate variability with return to baseline, continued pushing was undertaken. With this she did bring baby to a crown but over multiple pushes was not able to breakthrough or pass her perineum. Therefore given Cat II FHT, LML episiotomy 1.5 cm in length was cut to expedite delivery. She pushed effectively and delivered a viable female infant with Apgars 8/9 over a 2nd degree laceration. Weight is pending and skin-to-skin was performed. Delayed cord clamping was performed. Peds NP present for delivery. Cord was clamped and cut. Placenta delivered via active management and was noted to be intact with a three vessel cord. Shortly after delivery of the placenta patient noted to have dramatic uterine atony with brisk bleeding. She was given PO miso, IM methergine, IM hemabate and TXA with improvement in uterine tone. Several sweeps were completed to clear clots and thus 2g ancef will be given. This did significantly improve uterine.  Patient examined and noted to have bilateral sulcal  tears that were repaired with 3-0 vicryl. Episiotomy repaired in standard fashion. Patient also noted to have a right periurethral tear that was repaired with 3 interrupted 4-0 vicryl sutures.      EBL 1000 ml. Sponge and needle counts correct. Mom and baby were transported to postpartum in stable condition.      Procedure: Vacuum Assisted Vaginal Delivery  Present for Procedure: Dr. Stark  Indication for Vacuum:fetal status  Prior to Initiation of Procedure Risks and Alternatives: Discussed risks of vacuum including abrasions/lacerations to the scalp, cephalohematoma, subgaleal hemorrhage, intracranial hemorrhage, retinal hemorrhage and hyperbilirubinemia for baby. Increased risk of higher degree vaginal tear for mother. Discussed risks of  section including bleeding, infection, damage to surrounding structures of the uterus including bladder, bowel, ureters, muscles, nerves, and baby.  This procedure appeared to have a high likelihood of success: EFW: 7.5, Maternal Pelvis: adequate   Fetal Station at Initial Placement of Vacuum: 2+  Position prior to placement of vacuum noted to be: LOT  Vacuum First Applied: 0330  Duration of Vacuum Procedure: 3.5minutes  Number of Pulls: 3.5 contractions (1st pop off was midway through first contraction)  Number of Pop-offs: 2  No pressure up between contractions  Vacuum pull times 330 (x20 seconds), 0334 (x~1min), 0337 (x~1min), 0341 (x~1min)  Duration of Cumulative Traction Time: 3.5 min  Given pop offs, improvement in tracing and improvement in station, vacuum then abandoned to keep pushing without           Hospital Course:   The patient's hospital course was unremarkable.  On discharge, her pain was well controlled. Vaginal bleeding is similar to peak menstrual flow.  Voiding without difficulty.  Ambulating well and tolerating a normal diet.  No fever.  Breastfeeding well.  Infant is stable.  No bowel movement yet.*  She was discharged on post-partum day  "#2.  /59   Pulse 75   Temp 98.3  F (36.8  C) (Oral)   Resp 16   Ht 1.651 m (5' 5\")   Wt 86 kg (189 lb 9.5 oz)   LMP 10/15/2019   SpO2 97%   Breastfeeding Unknown   BMI 31.55 kg/m  \  Exam:  Constitutional: healthy, alert and no distress  Respiratory: negative  Gastrointestinal: Abdomen soft, non-tender. BS normal. No masses, organomegaly  : Deferred  Musculoskeletal: extremities normal- no gross deformities noted, gait normal and normal muscle tone  Skin: no suspicious lesions or rashes  Neurologic: Gait normal. Reflexes normal and symmetric. Sensation grossly WNL.  Psychiatric: mentation appears normal and affect normal/bright    Post-partum hemoglobin:   Hemoglobin   Date Value Ref Range Status   07/26/2020 8.8 (L) 11.7 - 15.7 g/dL Final             Discharge Instructions and Follow-Up:   Discharge diet: Regular   Discharge activity: Pelvic rest: abstain from intercourse and do not use tampons for 6 week(s)   Discharge follow-up: Follow up with   in 6 weeks   Wound care: Drink plenty of fluids           Discharge Disposition:   Discharged to home      Attestation:  I have reviewed today's vital signs, notes, medications, labs and imaging.  Amount of time performed on this discharge summary: 10 minutes.    Nigel Gamboa MD     "

## 2020-07-28 NOTE — ADDENDUM NOTE
Addendum  created 07/28/20 0653 by Farheen Villavicencio APRN CRNA    Intraprocedure Staff edited

## 2020-08-07 ENCOUNTER — TRANSFERRED RECORDS (OUTPATIENT)
Dept: HEALTH INFORMATION MANAGEMENT | Facility: CLINIC | Age: 30
End: 2020-08-07

## 2020-08-21 ENCOUNTER — MEDICAL CORRESPONDENCE (OUTPATIENT)
Dept: HEALTH INFORMATION MANAGEMENT | Facility: CLINIC | Age: 30
End: 2020-08-21

## 2020-09-10 ENCOUNTER — PRENATAL OFFICE VISIT (OUTPATIENT)
Dept: OBGYN | Facility: CLINIC | Age: 30
End: 2020-09-10
Payer: COMMERCIAL

## 2020-09-10 VITALS
TEMPERATURE: 98 F | HEART RATE: 69 BPM | WEIGHT: 163 LBS | BODY MASS INDEX: 27.12 KG/M2 | DIASTOLIC BLOOD PRESSURE: 54 MMHG | SYSTOLIC BLOOD PRESSURE: 100 MMHG

## 2020-09-10 DIAGNOSIS — Z30.430 ENCOUNTER FOR INSERTION OF MIRENA IUD: ICD-10-CM

## 2020-09-10 DIAGNOSIS — Z30.430 ENCOUNTER FOR INSERTION OF INTRAUTERINE CONTRACEPTIVE DEVICE: ICD-10-CM

## 2020-09-10 PROCEDURE — 99207 ZZC POST PARTUM EXAM: CPT | Performed by: OBSTETRICS & GYNECOLOGY

## 2020-09-10 PROCEDURE — G0145 SCR C/V CYTO,THINLAYER,RESCR: HCPCS | Performed by: OBSTETRICS & GYNECOLOGY

## 2020-09-10 PROCEDURE — 58300 INSERT INTRAUTERINE DEVICE: CPT | Performed by: OBSTETRICS & GYNECOLOGY

## 2020-09-10 ASSESSMENT — PATIENT HEALTH QUESTIONNAIRE - PHQ9
5. POOR APPETITE OR OVEREATING: NOT AT ALL
SUM OF ALL RESPONSES TO PHQ QUESTIONS 1-9: 2

## 2020-09-10 ASSESSMENT — ANXIETY QUESTIONNAIRES
GAD7 TOTAL SCORE: 2
3. WORRYING TOO MUCH ABOUT DIFFERENT THINGS: SEVERAL DAYS
5. BEING SO RESTLESS THAT IT IS HARD TO SIT STILL: NOT AT ALL
6. BECOMING EASILY ANNOYED OR IRRITABLE: NOT AT ALL
2. NOT BEING ABLE TO STOP OR CONTROL WORRYING: NOT AT ALL
IF YOU CHECKED OFF ANY PROBLEMS ON THIS QUESTIONNAIRE, HOW DIFFICULT HAVE THESE PROBLEMS MADE IT FOR YOU TO DO YOUR WORK, TAKE CARE OF THINGS AT HOME, OR GET ALONG WITH OTHER PEOPLE: NOT DIFFICULT AT ALL
7. FEELING AFRAID AS IF SOMETHING AWFUL MIGHT HAPPEN: NOT AT ALL
1. FEELING NERVOUS, ANXIOUS, OR ON EDGE: SEVERAL DAYS

## 2020-09-10 NOTE — NURSING NOTE
"Initial /54 (BP Location: Right arm, Patient Position: Chair, Cuff Size: Adult Regular)   Pulse 69   Temp 98  F (36.7  C) (Tympanic)   Wt 73.9 kg (163 lb)   LMP 10/15/2019   Breastfeeding Yes   BMI 27.12 kg/m   Estimated body mass index is 27.12 kg/m  as calculated from the following:    Height as of 7/24/20: 1.651 m (5' 5\").    Weight as of this encounter: 73.9 kg (163 lb). .    Carmencita Mascorro MA    "

## 2020-09-10 NOTE — PROGRESS NOTES
6 week Postpartum Visit Note    S:  Eliz Mckinney is here for her 6-week postpartum checkup.     Delivery Date: 7/25/20   Delivering provider:  [place]  Type of delivery:  VAVD  Feeding Method:  Breast  Bleeding:  Resolved  Bowel/Urinary problems:  Denies  Mood: denies concern -- recently increased selective serotonin reuptake inhibitor, has visit planned with psych provider, feels as though the dose increase worked, has no concerns today    Contraception Planned: IUD ================================================================  ROS: 10 point ROS neg other than the symptoms noted above in the HPI.     O:  EXAM:  LMP 10/15/2019     General: alert, oriented, well appearing  Psych: mood appropriate,   Breasts:  no swelling or redness  Abdomen: soft, non tender, uterus properly involuted,   : normal appearing external genital, vaginal mucosa, cervix. Uterus involuted and non tender, no adnexal masses.    IUD Placement Procedure Note    Eliz Mckinney  1990  [id number]    The patient was counseled on the risks (including including risk of infection, uterine perforation, cramping), benefits (high efficacy, low maintenance birth control, reduced risk of uterine cancer and hyperplasia, improvement in menstrual bleeding), and alternatives of the procedure. Verbal and written consent were obtained.  UPT not collected as patient not sexually active since delivery.    Technique: The patient was placed in the dorsal lithotomy position.  A speculum was placed in the vagina and the cervix was cleaned with betadine swabsx3. The anterior cervical lip was grasped with a tenaculum. The Mirena IUD was loaded, advanced to the fundus, pulled back 1cm, deployed and advanced to the fundus. Using the insertor as a sound, the uterus sounded to 8 cm. IUD strings were cut 3cm from the external cervical os. The patient tolerated the procedure well and there were no complications. EBL: 0cc.     Discussed option of returning to  clinic for a string check. She will attempt at home in 4 weeks and return if unable to palpate    A/P:   30 year old  who is 6w PP s/p VAVD, doing well. Return for annual exam, sooner if new concerns. Pap collected today as due.       Pepper Stark MD   9/10/2020 1:59 PM

## 2020-09-11 ASSESSMENT — ANXIETY QUESTIONNAIRES: GAD7 TOTAL SCORE: 2

## 2020-09-14 LAB
COPATH REPORT: NORMAL
PAP: NORMAL

## 2020-09-21 ENCOUNTER — MEDICAL CORRESPONDENCE (OUTPATIENT)
Dept: HEALTH INFORMATION MANAGEMENT | Facility: CLINIC | Age: 30
End: 2020-09-21

## 2020-10-26 ENCOUNTER — TRANSFERRED RECORDS (OUTPATIENT)
Dept: HEALTH INFORMATION MANAGEMENT | Facility: CLINIC | Age: 30
End: 2020-10-26

## 2020-11-30 ENCOUNTER — MEDICAL CORRESPONDENCE (OUTPATIENT)
Dept: HEALTH INFORMATION MANAGEMENT | Facility: CLINIC | Age: 30
End: 2020-11-30

## 2021-05-11 ENCOUNTER — TRANSFERRED RECORDS (OUTPATIENT)
Dept: HEALTH INFORMATION MANAGEMENT | Facility: CLINIC | Age: 31
End: 2021-05-11

## 2021-06-28 ENCOUNTER — VIRTUAL VISIT (OUTPATIENT)
Dept: FAMILY MEDICINE | Facility: CLINIC | Age: 31
End: 2021-06-28
Payer: COMMERCIAL

## 2021-06-28 DIAGNOSIS — R82.90 NONSPECIFIC FINDING ON EXAMINATION OF URINE: Primary | ICD-10-CM

## 2021-06-28 DIAGNOSIS — N30.01 ACUTE CYSTITIS WITH HEMATURIA: Primary | ICD-10-CM

## 2021-06-28 DIAGNOSIS — R39.9 URINARY TRACT INFECTION SYMPTOMS: ICD-10-CM

## 2021-06-28 LAB
BACTERIA #/AREA URNS HPF: ABNORMAL /HPF
NON-SQ EPI CELLS #/AREA URNS LPF: ABNORMAL /LPF
RBC #/AREA URNS AUTO: ABNORMAL /HPF
WBC #/AREA URNS AUTO: ABNORMAL /HPF

## 2021-06-28 PROCEDURE — 87186 SC STD MICRODIL/AGAR DIL: CPT | Performed by: FAMILY MEDICINE

## 2021-06-28 PROCEDURE — 87086 URINE CULTURE/COLONY COUNT: CPT | Performed by: FAMILY MEDICINE

## 2021-06-28 PROCEDURE — 99213 OFFICE O/P EST LOW 20 MIN: CPT | Mod: GT | Performed by: FAMILY MEDICINE

## 2021-06-28 PROCEDURE — 81015 MICROSCOPIC EXAM OF URINE: CPT | Performed by: FAMILY MEDICINE

## 2021-06-28 PROCEDURE — 87088 URINE BACTERIA CULTURE: CPT | Performed by: FAMILY MEDICINE

## 2021-06-28 RX ORDER — SULFAMETHOXAZOLE/TRIMETHOPRIM 800-160 MG
1 TABLET ORAL 2 TIMES DAILY
Qty: 14 TABLET | Refills: 0 | Status: SHIPPED | OUTPATIENT
Start: 2021-06-28 | End: 2021-07-05

## 2021-06-28 NOTE — PROGRESS NOTES
Eliz is a 30 year old who is being evaluated via a billable video visit.      How would you like to obtain your AVS? MyChart  If the video visit is dropped, the invitation should be resent by: Text to cell phone: [phone]  Will anyone else be joining your video visit? No    Video Start Time: 10:00 am     Assessment & Plan       Eliz was seen today for uti.    Diagnoses and all orders for this visit:    Acute cystitis with hematuria  -     sulfamethoxazole-trimethoprim (BACTRIM DS) 800-160 MG tablet; Take 1 tablet by mouth 2 times daily for 7 days    Urinary tract infection symptoms  -     *UA reflex to Microscopic and Culture ([facility] (except [place] and [place]); Future      Await urine culture.     Return in about 1 week (around 7/5/2021), or if symptoms worsen or fail to improve.    Sulma Aly MD  [facility] DASHAWN Wellington is a 30 year old who presents for the following health issues     HPI       Schedule lab appt for today at 11:30 to leave urine sample.     Genitourinary - Female  Onset/Duration: x2 days   Description:   Painful urination (Dysuria): YES           Frequency: YES  Blood in urine (Hematuria): no  Delay in urine (Hesitency): no  Intensity: moderate  Progression of Symptoms:  worsening  Accompanying Signs & Symptoms:  Fever/chills: no  Flank pain: YES  Nausea and vomiting: no  Vaginal symptoms: odor--- notices odor with urination   Abdominal/Pelvic Pain: no  History:   History of frequent UTI s: no  History of kidney stones: no  Sexually Active: YES  Possibility of pregnancy: No  Precipitating or alleviating factors: None  Therapies tried and outcome:  AZO         Review of Systems   Constitutional, HEENT, cardiovascular, pulmonary, gi and gu systems are negative, except as otherwise noted.      Objective           Vitals:  No vitals were obtained today due to virtual visit.    Physical Exam   GENERAL: Healthy, alert and no  distress  EYES: Eyes grossly normal to inspection.  No discharge or erythema, or obvious scleral/conjunctival abnormalities.  RESP: No audible wheeze, cough, or visible cyanosis.  No visible retractions or increased work of breathing.    SKIN: Visible skin clear. No significant rash, abnormal pigmentation or lesions.  NEURO: Cranial nerves grossly intact.  Mentation and speech appropriate for age.  PSYCH: Mentation appears normal, affect normal/bright, judgement and insight intact, normal speech and appearance well-groomed.    DATA  Results for orders placed or performed in visit on 06/28/21   Urine Microscopic     Status: Abnormal   Result Value Ref Range    WBC Urine  (A) OTO5^0 - 5 /HPF    RBC Urine 2-5 (A) OTO2^O - 2 /HPF    Squamous Epithelial /LPF Urine Few FEW^Few /LPF    Bacteria Urine Few (A) NEG^Negative /HPF                 Video-Visit Details    Type of service:  Video Visit    Video End Time:10:15 am     Originating Location (pt. Location): Home    Distant Location (provider location):  [facility]     Platform used for Video Visit: [email or web address]

## 2021-06-30 LAB
BACTERIA SPEC CULT: ABNORMAL
Lab: ABNORMAL
SPECIMEN SOURCE: ABNORMAL

## 2021-09-18 ENCOUNTER — HEALTH MAINTENANCE LETTER (OUTPATIENT)
Age: 31
End: 2021-09-18

## 2021-11-07 ENCOUNTER — HEALTH MAINTENANCE LETTER (OUTPATIENT)
Age: 31
End: 2021-11-07

## 2022-01-07 ENCOUNTER — OFFICE VISIT (OUTPATIENT)
Dept: OBGYN | Facility: CLINIC | Age: 32
End: 2022-01-07
Payer: COMMERCIAL

## 2022-01-07 VITALS
DIASTOLIC BLOOD PRESSURE: 70 MMHG | TEMPERATURE: 98.4 F | SYSTOLIC BLOOD PRESSURE: 109 MMHG | HEART RATE: 72 BPM | BODY MASS INDEX: 28.46 KG/M2 | WEIGHT: 171 LBS

## 2022-01-07 DIAGNOSIS — Z23 NEED FOR PROPHYLACTIC VACCINATION AND INOCULATION AGAINST INFLUENZA: Primary | ICD-10-CM

## 2022-01-07 DIAGNOSIS — Z30.432 ENCOUNTER FOR REMOVAL OF INTRAUTERINE CONTRACEPTIVE DEVICE: ICD-10-CM

## 2022-01-07 PROCEDURE — 90471 IMMUNIZATION ADMIN: CPT | Performed by: ADVANCED PRACTICE MIDWIFE

## 2022-01-07 PROCEDURE — 90686 IIV4 VACC NO PRSV 0.5 ML IM: CPT | Performed by: ADVANCED PRACTICE MIDWIFE

## 2022-01-07 PROCEDURE — 58301 REMOVE INTRAUTERINE DEVICE: CPT | Performed by: ADVANCED PRACTICE MIDWIFE

## 2022-01-07 NOTE — NURSING NOTE
"Initial /70 (BP Location: Right arm, Patient Position: Chair, Cuff Size: Adult Large)   Pulse 72   Temp 98.4  F (36.9  C) (Tympanic)   Wt 77.6 kg (171 lb)   Breastfeeding No   BMI 28.46 kg/m   Estimated body mass index is 28.46 kg/m  as calculated from the following:    Height as of 7/24/20: 1.651 m (5' 5\").    Weight as of this encounter: 77.6 kg (171 lb). .    Carmencita Mascorro MA    "

## 2022-02-15 ENCOUNTER — APPOINTMENT (OUTPATIENT)
Dept: OBGYN | Facility: CLINIC | Age: 32
End: 2022-02-15
Payer: COMMERCIAL

## 2022-02-15 ENCOUNTER — PRENATAL OFFICE VISIT (OUTPATIENT)
Dept: OBGYN | Facility: CLINIC | Age: 32
End: 2022-02-15

## 2022-02-15 DIAGNOSIS — Z34.80 PRENATAL CARE, SUBSEQUENT PREGNANCY: ICD-10-CM

## 2022-02-15 PROCEDURE — 99207 PR NO CHARGE NURSE ONLY: CPT | Performed by: OBSTETRICS & GYNECOLOGY

## 2022-03-16 ENCOUNTER — PRENATAL OFFICE VISIT (OUTPATIENT)
Dept: OBGYN | Facility: CLINIC | Age: 32
End: 2022-03-16
Payer: COMMERCIAL

## 2022-03-16 VITALS
HEIGHT: 65 IN | BODY MASS INDEX: 28.99 KG/M2 | RESPIRATION RATE: 18 BRPM | WEIGHT: 174 LBS | SYSTOLIC BLOOD PRESSURE: 105 MMHG | TEMPERATURE: 98.7 F | DIASTOLIC BLOOD PRESSURE: 63 MMHG | HEART RATE: 74 BPM

## 2022-03-16 DIAGNOSIS — Z34.81 PRENATAL CARE, SUBSEQUENT PREGNANCY IN FIRST TRIMESTER: Primary | ICD-10-CM

## 2022-03-16 PROBLEM — Z37.9 NORMAL LABOR: Status: RESOLVED | Noted: 2020-07-24 | Resolved: 2022-03-16

## 2022-03-16 PROBLEM — Z30.430 ENCOUNTER FOR INSERTION OF MIRENA IUD: Status: RESOLVED | Noted: 2020-09-10 | Resolved: 2022-03-16

## 2022-03-16 PROBLEM — Z34.00 PRENATAL CARE, FIRST PREGNANCY: Status: RESOLVED | Noted: 2019-12-03 | Resolved: 2022-03-16

## 2022-03-16 LAB
ABO/RH(D): NORMAL
ALBUMIN UR-MCNC: NEGATIVE MG/DL
ANTIBODY SCREEN: NEGATIVE
APPEARANCE UR: CLEAR
BACTERIA #/AREA URNS HPF: ABNORMAL /HPF
BILIRUB UR QL STRIP: NEGATIVE
COLOR UR AUTO: YELLOW
ERYTHROCYTE [DISTWIDTH] IN BLOOD BY AUTOMATED COUNT: 11.3 % (ref 10–15)
GLUCOSE UR STRIP-MCNC: NEGATIVE MG/DL
HCT VFR BLD AUTO: 36 % (ref 35–47)
HGB BLD-MCNC: 12 G/DL (ref 11.7–15.7)
HGB UR QL STRIP: NEGATIVE
KETONES UR STRIP-MCNC: NEGATIVE MG/DL
LEUKOCYTE ESTERASE UR QL STRIP: NEGATIVE
MCH RBC QN AUTO: 31.2 PG (ref 26.5–33)
MCHC RBC AUTO-ENTMCNC: 33.3 G/DL (ref 31.5–36.5)
MCV RBC AUTO: 94 FL (ref 78–100)
NITRATE UR QL: NEGATIVE
PH UR STRIP: 6 [PH] (ref 5–7)
PLATELET # BLD AUTO: 223 10E3/UL (ref 150–450)
RBC # BLD AUTO: 3.85 10E6/UL (ref 3.8–5.2)
RBC #/AREA URNS AUTO: ABNORMAL /HPF
SP GR UR STRIP: 1.01 (ref 1–1.03)
SPECIMEN EXPIRATION DATE: NORMAL
SQUAMOUS #/AREA URNS AUTO: ABNORMAL /LPF
UROBILINOGEN UR STRIP-ACNC: 0.2 E.U./DL
WBC # BLD AUTO: 7.7 10E3/UL (ref 4–11)
WBC #/AREA URNS AUTO: ABNORMAL /HPF

## 2022-03-16 PROCEDURE — 87591 N.GONORRHOEAE DNA AMP PROB: CPT | Performed by: OBSTETRICS & GYNECOLOGY

## 2022-03-16 PROCEDURE — 86780 TREPONEMA PALLIDUM: CPT | Performed by: OBSTETRICS & GYNECOLOGY

## 2022-03-16 PROCEDURE — 86762 RUBELLA ANTIBODY: CPT | Performed by: OBSTETRICS & GYNECOLOGY

## 2022-03-16 PROCEDURE — 36415 COLL VENOUS BLD VENIPUNCTURE: CPT | Performed by: OBSTETRICS & GYNECOLOGY

## 2022-03-16 PROCEDURE — 99207 PR FIRST OB VISIT: CPT | Performed by: OBSTETRICS & GYNECOLOGY

## 2022-03-16 PROCEDURE — 87389 HIV-1 AG W/HIV-1&-2 AB AG IA: CPT | Performed by: OBSTETRICS & GYNECOLOGY

## 2022-03-16 PROCEDURE — 86900 BLOOD TYPING SEROLOGIC ABO: CPT | Performed by: OBSTETRICS & GYNECOLOGY

## 2022-03-16 PROCEDURE — 86901 BLOOD TYPING SEROLOGIC RH(D): CPT | Performed by: OBSTETRICS & GYNECOLOGY

## 2022-03-16 PROCEDURE — 85027 COMPLETE CBC AUTOMATED: CPT | Performed by: OBSTETRICS & GYNECOLOGY

## 2022-03-16 PROCEDURE — 86850 RBC ANTIBODY SCREEN: CPT | Performed by: OBSTETRICS & GYNECOLOGY

## 2022-03-16 PROCEDURE — 87491 CHLMYD TRACH DNA AMP PROBE: CPT | Performed by: OBSTETRICS & GYNECOLOGY

## 2022-03-16 PROCEDURE — 86803 HEPATITIS C AB TEST: CPT | Performed by: OBSTETRICS & GYNECOLOGY

## 2022-03-16 PROCEDURE — 87086 URINE CULTURE/COLONY COUNT: CPT | Performed by: OBSTETRICS & GYNECOLOGY

## 2022-03-16 PROCEDURE — 76817 TRANSVAGINAL US OBSTETRIC: CPT | Performed by: OBSTETRICS & GYNECOLOGY

## 2022-03-16 PROCEDURE — 87340 HEPATITIS B SURFACE AG IA: CPT | Performed by: OBSTETRICS & GYNECOLOGY

## 2022-03-16 PROCEDURE — 81001 URINALYSIS AUTO W/SCOPE: CPT | Performed by: OBSTETRICS & GYNECOLOGY

## 2022-03-16 RX ORDER — FOLIC ACID 20 MG
CAPSULE ORAL
COMMUNITY
End: 2022-05-11

## 2022-03-16 RX ORDER — MULTIVITAMIN WITH IRON
100 TABLET ORAL DAILY
COMMUNITY
End: 2022-05-11

## 2022-03-16 NOTE — NURSING NOTE
"Initial /63 (BP Location: Right arm, Patient Position: Chair, Cuff Size: Adult Regular)   Pulse 74   Temp 98.7  F (37.1  C) (Tympanic)   Resp 18   Ht 1.651 m (5' 5\")   Wt 78.9 kg (174 lb)   LMP 01/10/2022 (Approximate)   Breastfeeding No   BMI 28.96 kg/m   Estimated body mass index is 28.96 kg/m  as calculated from the following:    Height as of this encounter: 1.651 m (5' 5\").    Weight as of this encounter: 78.9 kg (174 lb). .      "

## 2022-03-16 NOTE — PROGRESS NOTES
Discussed physician coverage, tertiary support, diet, exercise, weight gain, schedule of visits, routine and indicated ultrasounds, childbirth education and antepartum testing for certain birth defects.  Encouraged patient to review contents of Prenatal Breastfeeding Education Toolkit. Offered opportunity to answer questions regarding the importance of skin to skin contact, early initiation of exclusive breastfeeding for the first six months and rooming in while in the hospital.    Syphilis is a sexually transmitted disease that can cause birth defects in the babies of untreated mothers. Every pregnant patient is tested for syphilis early in each pregnancy as part of the routine lab work. The [place] Department of [facility] has seen an increase in the rate of syphilis in [place]. The [facility] now recommends testing for syphilis 2 times during a pregnancy, the new prenatal visit, and 28 weeks or when admitted for delivery. Patient accepts lab testing for syphilis.    Group call support for deliveries was discussed, as well as tertiary support in event of high risk issues within [facility].    Discussed current state of COVID-19 in pregnancy, when to seek out emergency care, how to self care at home with milder symptoms.  Discussed COVID vaccine, latest safety information, potential benefits in pregnancy to mom and baby (lower risk for hospitalization and death)        Options for  testing for birth defects were discussed with the patient, generally including CVS testing, Quad screen serum testing, nuchal lucency/blood marker testing, genetic amniocentesis, NIPT testing  and/or level 2 ultrasound.    Current issues include: nausea, mild    Past medical, surgical, social and family histories reviewed on OB questionaire and included on episode summary.  Pertinent review of systems items stated above. See OB questionaire for pertinent components of HPI.    Past Medical History:   Diagnosis  "Date     Chickenpox      Gastritis     in high school     History of urinary tract infection      Other specified gastritis without mention of hemorrhage 03/19/2008     Postpartum depression 2019     See epic chart    Past Surgical History:   Procedure Laterality Date     NO HISTORY OF SURGERY       See epic chart    Patient Active Problem List    Diagnosis Date Noted     Prenatal care, subsequent pregnancy 02/15/2022     Priority: Medium     Adjustment disorder with mixed anxiety and depressed mood 03/19/2014     Priority: Medium     CARDIOVASCULAR SCREENING; LDL GOAL LESS THAN 130 01/08/2013     Priority: Medium     Excessive or frequent menstruation 02/09/2007     Priority: Medium       OBJECTIVE: /63 (BP Location: Right arm, Patient Position: Chair, Cuff Size: Adult Regular)   Pulse 74   Temp 98.7  F (37.1  C) (Tympanic)   Resp 18   Ht 1.651 m (5' 5\")   Wt 78.9 kg (174 lb)   LMP 01/10/2022 (Approximate)   Breastfeeding No   BMI 28.96 kg/m      GENERAL APPEARANCE: healthy, alert and no distress  ABDOMEN:  soft, nontender, no hepato-splenomegaly or hernias  PELVIC:  EGBUS:  within normal limits  VAGINA:  normoestrogenic, well-supported, no unusual discharge  CERVIX:  smooth, non-friable, no gross lesions, thin-layer PAP was not taken  UTERUS:  RV nontender, 8 week size  ADNEXAE:  non-tender, no masses palpable, no cul de sac nodularity     NECK: no adenopathy, no asymmetry, masses, or scars and thyroid normal to palpation     RESP: lungs clear to auscultation , respiratory effort WNL     CV: regular rates and rhythm, normal S1 S2, no S3 or S4 and no murmur, click or rub -     SKIN: no suspicious lesions or rashes     PSYCH: mentation appears normal. and affect normal/bright, oriented to person/place/time     LYMPHATICS: No axillary, cervical, inguinal, or supraclavicular nodes    Transvaginal ultrasound was performed. A live single intrauterine pregnancy was seen.  CRL=2.42 cm, c/w 9 weeks, 1 days.  " EDC by sono =10/18/22  EDC by LMP=10/17/22+++    Fetal heart motion was visualized. LDH=849 bpm                  ASSESSMENT:    ICD-10-CM    1. Prenatal care, subsequent pregnancy in first trimester  Z34.81 Chlamydia trachomatis/Neisseria gonorrhoeae by PCR         PLAN: see orders and OB problem list annotations  Wants NIPT with gender  Wants to remain on Citalopram     Tiana Stein MD

## 2022-03-17 LAB
C TRACH DNA SPEC QL PROBE+SIG AMP: NEGATIVE
HBV SURFACE AG SERPL QL IA: NONREACTIVE
HCV AB SERPL QL IA: NONREACTIVE
HIV 1+2 AB+HIV1 P24 AG SERPL QL IA: NONREACTIVE
N GONORRHOEA DNA SPEC QL NAA+PROBE: NEGATIVE
RUBV IGG SERPL QL IA: 3.14 INDEX
RUBV IGG SERPL QL IA: POSITIVE
T PALLIDUM AB SER QL: NONREACTIVE

## 2022-03-18 LAB — BACTERIA UR CULT: NO GROWTH

## 2022-03-24 ENCOUNTER — ALLIED HEALTH/NURSE VISIT (OUTPATIENT)
Dept: OBGYN | Facility: CLINIC | Age: 32
End: 2022-03-24
Payer: COMMERCIAL

## 2022-03-24 DIAGNOSIS — Z34.80 PRENATAL CARE OF MULTIGRAVIDA, ANTEPARTUM: Primary | ICD-10-CM

## 2022-03-24 PROCEDURE — 36415 COLL VENOUS BLD VENIPUNCTURE: CPT

## 2022-03-24 PROCEDURE — 99207 PR NO CHARGE NURSE ONLY: CPT

## 2022-03-24 NOTE — NURSING NOTE
NIPT paperwork today with gender.  Patient would like gender through a my chart message with the subject GENDER so her and her  could view together.

## 2022-03-28 LAB — SCANNED LAB RESULT: NORMAL

## 2022-04-14 ENCOUNTER — PRENATAL OFFICE VISIT (OUTPATIENT)
Dept: OBGYN | Facility: CLINIC | Age: 32
End: 2022-04-14
Payer: COMMERCIAL

## 2022-04-14 VITALS
HEART RATE: 88 BPM | DIASTOLIC BLOOD PRESSURE: 67 MMHG | BODY MASS INDEX: 28.96 KG/M2 | WEIGHT: 174 LBS | TEMPERATURE: 98 F | SYSTOLIC BLOOD PRESSURE: 121 MMHG

## 2022-04-14 DIAGNOSIS — Z34.91 PRENATAL CARE, FIRST TRIMESTER: Primary | ICD-10-CM

## 2022-04-14 PROCEDURE — 99207 PR PRENATAL VISIT: CPT | Performed by: OBSTETRICS & GYNECOLOGY

## 2022-04-14 NOTE — NURSING NOTE
"Initial /67 (BP Location: Right arm, Patient Position: Chair, Cuff Size: Adult Regular)   Pulse 88   Temp 98  F (36.7  C) (Tympanic)   Wt 78.9 kg (174 lb)   LMP 01/10/2022 (Approximate)   Breastfeeding No   BMI 28.96 kg/m   Estimated body mass index is 28.96 kg/m  as calculated from the following:    Height as of 3/16/22: 1.651 m (5' 5\").    Weight as of this encounter: 78.9 kg (174 lb). .    Carmencita Mascorro MA      "

## 2022-04-14 NOTE — PROGRESS NOTES
"[facility]   OB/GYN Clinic    CC: Return OB     Subjective:    Eliz is a 31 year old  at 13w3d who presents for return OB visit. She reports feeling well. Denies uterine cramping, vaginal bleeding or leaking    Objective:  /67 (BP Location: Right arm, Patient Position: Chair, Cuff Size: Adult Regular)   Pulse 88   Temp 98  F (36.7  C) (Tympanic)   Wt 78.9 kg (174 lb)   LMP 01/10/2022 (Approximate)   Breastfeeding No   BMI 28.96 kg/m      Estimated body mass index is 28.96 kg/m  as calculated from the following:    Height as of 3/16/22: 1.651 m (5' 5\").    Weight as of this encounter: 78.9 kg (174 lb).    Physical Exam:  Gen: Pleasant, talkative female in no apparent distress   Respiratory: breathing comfortably on room air   Cardiac: Warm and well-perfused.   GI: Abd soft and non-tender, gravid  MSK: Grossly normal movement of all four extremities  Psych: mood and affect bright   Lower extremity: edema not present     Fetal dop tones: 160s bpm    Assessment/Plan:   31 year old  at 13w3d who presents for follow-up OB visit.   1) New OB lab; T&S, CBC, HIV, RPR, HepBsAg, Hep B antibody, rubella, GC/Chlam WNL. Plan for 3rd tri lab at 28wks.   2) Genetics testing: NIPT WNL  3) anatomys can at 20w  4) Medication review: no changes, continue prenatal vitamin   5) on citalopram, mood stable    Return to clinic in 4 weeks.     Pepper Stark MD 2022 3:35 PM     "

## 2022-05-11 ENCOUNTER — PRENATAL OFFICE VISIT (OUTPATIENT)
Dept: OBGYN | Facility: CLINIC | Age: 32
End: 2022-05-11

## 2022-05-11 VITALS
SYSTOLIC BLOOD PRESSURE: 101 MMHG | DIASTOLIC BLOOD PRESSURE: 59 MMHG | HEART RATE: 81 BPM | BODY MASS INDEX: 28.86 KG/M2 | RESPIRATION RATE: 14 BRPM | WEIGHT: 173.2 LBS | HEIGHT: 65 IN | TEMPERATURE: 98.7 F

## 2022-05-11 DIAGNOSIS — Z34.82 PRENATAL CARE, SUBSEQUENT PREGNANCY IN SECOND TRIMESTER: Primary | ICD-10-CM

## 2022-05-11 PROCEDURE — 99207 PR PRENATAL VISIT: CPT | Performed by: OBSTETRICS & GYNECOLOGY

## 2022-05-11 NOTE — PROGRESS NOTES
"[facility]   OB/GYN Clinic     CC: Return OB      Subjective:     Eliz is a 31 year old  at 17w2d who presents for return OB visit. She reports feeling well. Denies uterine cramping, vaginal bleeding or leaking.      Objective:  /59 (BP Location: Left arm, Patient Position: Chair, Cuff Size: Adult Regular)   Pulse 81   Temp 98.7  F (37.1  C) (Tympanic)   Resp 14   Ht 1.651 m (5' 5\")   Wt 78.6 kg (173 lb 3.2 oz)   LMP 01/10/2022 (Approximate)   BMI 28.82 kg/m       Physical Exam:  Gen: Pleasant, talkative female in no apparent distress   Respiratory: breathing comfortably on room air   Cardiac: Warm and well-perfused.   GI: Abd soft and non-tender, gravid  MSK: Grossly normal movement of all four extremities  Psych: mood and affect bright   Lower extremity: edema not present      See OB flowsheet     Assessment/Plan:   31 year old  at 17w2d who presents for follow-up OB visit.   1) New OB lab; T&S, CBC, HIV, RPR, HepBsAg, Hep B antibody, rubella, GC/Chlam WNL. Plan for 3rd tri lab at 28wks.   2) Genetics testing: NIPT WNL  3) anatomys can at 20w  4) Medication review: no changes, continue prenatal vitamin   5) on citalopram, mood stable     Return to clinic in 4 weeks. Quickening and bleeding precautions reviewed.     Linda Land MD  OB/GYN     "

## 2022-05-11 NOTE — PROGRESS NOTES
"Initial /59 (BP Location: Left arm, Patient Position: Chair, Cuff Size: Adult Regular)   Pulse 81   Temp 98.7  F (37.1  C) (Tympanic)   Resp 14   Ht 1.651 m (5' 5\")   Wt 78.6 kg (173 lb 3.2 oz)   LMP 01/10/2022 (Approximate)   BMI 28.82 kg/m   Estimated body mass index is 28.82 kg/m  as calculated from the following:    Height as of this encounter: 1.651 m (5' 5\").    Weight as of this encounter: 78.6 kg (173 lb 3.2 oz). .      "

## 2022-05-31 ENCOUNTER — ANCILLARY PROCEDURE (OUTPATIENT)
Dept: ULTRASOUND IMAGING | Facility: CLINIC | Age: 32
End: 2022-05-31
Attending: OBSTETRICS & GYNECOLOGY
Payer: COMMERCIAL

## 2022-05-31 DIAGNOSIS — Z34.82 PRENATAL CARE, SUBSEQUENT PREGNANCY IN SECOND TRIMESTER: ICD-10-CM

## 2022-05-31 PROCEDURE — 76805 OB US >/= 14 WKS SNGL FETUS: CPT | Mod: TC | Performed by: RADIOLOGY

## 2022-06-08 ENCOUNTER — PRENATAL OFFICE VISIT (OUTPATIENT)
Dept: OBGYN | Facility: CLINIC | Age: 32
End: 2022-06-08
Payer: COMMERCIAL

## 2022-06-08 VITALS
DIASTOLIC BLOOD PRESSURE: 59 MMHG | SYSTOLIC BLOOD PRESSURE: 101 MMHG | BODY MASS INDEX: 29.66 KG/M2 | HEART RATE: 69 BPM | TEMPERATURE: 98.7 F | RESPIRATION RATE: 18 BRPM | WEIGHT: 178 LBS | HEIGHT: 65 IN

## 2022-06-08 DIAGNOSIS — Z34.82 ENCOUNTER FOR SUPERVISION OF OTHER NORMAL PREGNANCY IN SECOND TRIMESTER: Primary | ICD-10-CM

## 2022-06-08 PROCEDURE — 99207 PR PRENATAL VISIT: CPT | Performed by: OBSTETRICS & GYNECOLOGY

## 2022-06-08 NOTE — PROGRESS NOTES
Concerns today: aNATOMIC SCREEN NORMAL  No nausea/vomiting. No heartburn.  No vaginal bleeding, no contractions/severe cramping, no leakage of fluid.  No vaginal discharge. No dysuria  No headache, vision changes, lower extremity swelling, upper abdominal pain, chest pain, shortness of breath.   Reportable signs and symptoms discussed.      Nigel Gamboa MD

## 2022-06-08 NOTE — NURSING NOTE
"Initial /59 (BP Location: Right arm, Patient Position: Chair, Cuff Size: Adult Regular)   Pulse 69   Temp 98.7  F (37.1  C) (Tympanic)   Resp 18   Ht 1.651 m (5' 5\")   Wt 80.7 kg (178 lb)   LMP 01/10/2022 (Approximate)   Breastfeeding No   BMI 29.62 kg/m   Estimated body mass index is 29.62 kg/m  as calculated from the following:    Height as of this encounter: 1.651 m (5' 5\").    Weight as of this encounter: 80.7 kg (178 lb). .      "

## 2022-07-07 ENCOUNTER — PRENATAL OFFICE VISIT (OUTPATIENT)
Dept: OBGYN | Facility: CLINIC | Age: 32
End: 2022-07-07
Payer: COMMERCIAL

## 2022-07-07 VITALS
HEART RATE: 97 BPM | BODY MASS INDEX: 30.35 KG/M2 | SYSTOLIC BLOOD PRESSURE: 110 MMHG | WEIGHT: 182.2 LBS | HEIGHT: 65 IN | RESPIRATION RATE: 12 BRPM | TEMPERATURE: 98.9 F | DIASTOLIC BLOOD PRESSURE: 69 MMHG

## 2022-07-07 DIAGNOSIS — Z34.82 PRENATAL CARE, SUBSEQUENT PREGNANCY IN SECOND TRIMESTER: Primary | ICD-10-CM

## 2022-07-07 LAB
GLUCOSE 1H P 50 G GLC PO SERPL-MCNC: 149 MG/DL (ref 70–129)
HGB BLD-MCNC: 10.8 G/DL (ref 11.7–15.7)

## 2022-07-07 PROCEDURE — 99207 PR PRENATAL VISIT: CPT | Performed by: ADVANCED PRACTICE MIDWIFE

## 2022-07-07 PROCEDURE — 82950 GLUCOSE TEST: CPT | Performed by: ADVANCED PRACTICE MIDWIFE

## 2022-07-07 PROCEDURE — 36415 COLL VENOUS BLD VENIPUNCTURE: CPT | Performed by: ADVANCED PRACTICE MIDWIFE

## 2022-07-07 PROCEDURE — 86780 TREPONEMA PALLIDUM: CPT | Performed by: ADVANCED PRACTICE MIDWIFE

## 2022-07-07 PROCEDURE — 85018 HEMOGLOBIN: CPT | Performed by: ADVANCED PRACTICE MIDWIFE

## 2022-07-07 NOTE — NURSING NOTE
"Initial /69 (BP Location: Left arm, Patient Position: Sitting, Cuff Size: Adult Regular)   Pulse 97   Temp 98.9  F (37.2  C) (Tympanic)   Resp 12   Ht 1.651 m (5' 5\")   Wt 82.6 kg (182 lb 3.2 oz)   LMP 01/10/2022 (Approximate)   BMI 30.32 kg/m   Estimated body mass index is 30.32 kg/m  as calculated from the following:    Height as of this encounter: 1.651 m (5' 5\").    Weight as of this encounter: 82.6 kg (182 lb 3.2 oz). .          "

## 2022-07-07 NOTE — PROGRESS NOTES
Eliz presents alone for a routine PNV at 25w 3d.  Feeling well! Feeling lots of fetal movement. Discussed and reviewed maternal growth chart. Mood good, stable last few years. RTC 3 weeks, sooner as needed.  Testing this visit: GCT, Hgb, and RPR. Anticipatory guidance given for next visit: Tdap.  Has OG/BYN clinic numbers and aware to call with DFM, PTL, LOF or any questions or concerns.    NICOLE Stewart CNM, NIDIA, CLC  7/7/2022 12:49 PM     Mood?

## 2022-07-08 ENCOUNTER — TELEPHONE (OUTPATIENT)
Dept: MIDWIFE SERVICES | Facility: CLINIC | Age: 32
End: 2022-07-08

## 2022-07-08 DIAGNOSIS — O99.810 IMPAIRED GLUCOSE IN PREGNANCY, ANTEPARTUM: Primary | ICD-10-CM

## 2022-07-08 LAB — T PALLIDUM AB SER QL: NONREACTIVE

## 2022-07-20 ENCOUNTER — LAB (OUTPATIENT)
Dept: LAB | Facility: CLINIC | Age: 32
End: 2022-07-20
Payer: COMMERCIAL

## 2022-07-20 DIAGNOSIS — O99.810 IMPAIRED GLUCOSE IN PREGNANCY, ANTEPARTUM: ICD-10-CM

## 2022-07-20 LAB
GESTATIONAL GTT 1 HR POST DOSE: 105 MG/DL (ref 60–179)
GESTATIONAL GTT 2 HR POST DOSE: 87 MG/DL (ref 60–154)
GESTATIONAL GTT 3 HR POST DOSE: 82 MG/DL (ref 60–139)
GLUCOSE P FAST SERPL-MCNC: 78 MG/DL (ref 60–94)

## 2022-07-20 PROCEDURE — 82952 GTT-ADDED SAMPLES: CPT

## 2022-07-20 PROCEDURE — 82951 GLUCOSE TOLERANCE TEST (GTT): CPT

## 2022-07-20 PROCEDURE — 36415 COLL VENOUS BLD VENIPUNCTURE: CPT

## 2022-08-03 ENCOUNTER — PRENATAL OFFICE VISIT (OUTPATIENT)
Dept: OBGYN | Facility: CLINIC | Age: 32
End: 2022-08-03
Payer: COMMERCIAL

## 2022-08-03 VITALS
WEIGHT: 186.4 LBS | BODY MASS INDEX: 31.06 KG/M2 | TEMPERATURE: 98.1 F | HEART RATE: 86 BPM | HEIGHT: 65 IN | RESPIRATION RATE: 16 BRPM | DIASTOLIC BLOOD PRESSURE: 61 MMHG | SYSTOLIC BLOOD PRESSURE: 106 MMHG

## 2022-08-03 DIAGNOSIS — Z34.80 ENCOUNTER FOR SUPERVISION OF NORMAL PREGNANCY IN MULTIGRAVIDA: Primary | ICD-10-CM

## 2022-08-03 PROCEDURE — 90715 TDAP VACCINE 7 YRS/> IM: CPT | Performed by: ADVANCED PRACTICE MIDWIFE

## 2022-08-03 PROCEDURE — 99207 PR PRENATAL VISIT: CPT | Performed by: ADVANCED PRACTICE MIDWIFE

## 2022-08-03 PROCEDURE — 90471 IMMUNIZATION ADMIN: CPT | Performed by: ADVANCED PRACTICE MIDWIFE

## 2022-08-03 RX ORDER — MULTIVIT-MIN/IRON/FOLIC ACID/K 18-600-40
CAPSULE ORAL
COMMUNITY
Start: 2022-07-11 | End: 2022-12-21

## 2022-08-03 RX ORDER — FERROUS SULFATE 325(65) MG
TABLET ORAL
COMMUNITY
Start: 2022-07-11 | End: 2022-12-21

## 2022-08-03 NOTE — NURSING NOTE
"Initial /61 (BP Location: Right arm, Patient Position: Chair, Cuff Size: Adult Regular)   Pulse 86   Temp 98.1  F (36.7  C) (Tympanic)   Resp 16   Ht 1.651 m (5' 5\")   Wt 84.6 kg (186 lb 6.4 oz)   LMP 01/10/2022 (Approximate)   BMI 31.02 kg/m   Estimated body mass index is 31.02 kg/m  as calculated from the following:    Height as of this encounter: 1.651 m (5' 5\").    Weight as of this encounter: 84.6 kg (186 lb 6.4 oz). .      "

## 2022-08-17 ENCOUNTER — PRENATAL OFFICE VISIT (OUTPATIENT)
Dept: OBGYN | Facility: CLINIC | Age: 32
End: 2022-08-17
Payer: COMMERCIAL

## 2022-08-17 VITALS
DIASTOLIC BLOOD PRESSURE: 64 MMHG | BODY MASS INDEX: 30.99 KG/M2 | HEIGHT: 65 IN | RESPIRATION RATE: 18 BRPM | TEMPERATURE: 98.2 F | HEART RATE: 90 BPM | SYSTOLIC BLOOD PRESSURE: 101 MMHG | WEIGHT: 186 LBS

## 2022-08-17 DIAGNOSIS — Z34.93 PRENATAL CARE IN THIRD TRIMESTER: Primary | ICD-10-CM

## 2022-08-17 PROCEDURE — 99207 PR PRENATAL VISIT: CPT | Performed by: ADVANCED PRACTICE MIDWIFE

## 2022-08-17 NOTE — NURSING NOTE
"Initial /64 (BP Location: Right arm, Patient Position: Chair, Cuff Size: Adult Regular)   Pulse 90   Temp 98.2  F (36.8  C) (Tympanic)   Resp 18   Ht 1.651 m (5' 5\")   Wt 84.4 kg (186 lb)   LMP 01/10/2022 (Approximate)   Breastfeeding No   BMI 30.95 kg/m   Estimated body mass index is 30.95 kg/m  as calculated from the following:    Height as of this encounter: 1.651 m (5' 5\").    Weight as of this encounter: 84.4 kg (186 lb). .      "

## 2022-08-17 NOTE — PROGRESS NOTES
Feeling well.  Baby is active. Denies any leaking of fluid, vaginal bleeding, regular uterine contractions, or headaches or other concerns.  Reviewed to call for contractions, loss of fluid, vaginal bleeding, decreased fetal movement or any other questions or concerns.    RTC in 2 weeks.  Catarina Warren, SUE, APRN, CNM

## 2022-08-31 ENCOUNTER — PRENATAL OFFICE VISIT (OUTPATIENT)
Dept: OBGYN | Facility: CLINIC | Age: 32
End: 2022-08-31
Payer: COMMERCIAL

## 2022-08-31 VITALS
HEIGHT: 65 IN | DIASTOLIC BLOOD PRESSURE: 67 MMHG | HEART RATE: 90 BPM | TEMPERATURE: 98.4 F | WEIGHT: 189.7 LBS | BODY MASS INDEX: 31.61 KG/M2 | RESPIRATION RATE: 16 BRPM | SYSTOLIC BLOOD PRESSURE: 106 MMHG

## 2022-08-31 DIAGNOSIS — Z34.83 ENCOUNTER FOR SUPERVISION OF OTHER NORMAL PREGNANCY IN THIRD TRIMESTER: Primary | ICD-10-CM

## 2022-08-31 PROCEDURE — 99207 PR PRENATAL VISIT: CPT | Performed by: OBSTETRICS & GYNECOLOGY

## 2022-08-31 NOTE — PROGRESS NOTES
Concerns:   Doing well.  No concerns today.  No vaginal bleeding, LOF.  No contractions.  Reportable signs and symptoms discussed.  Discussed kick counts and fetal movement.  Discussed PTL, PROM, and when to call or come in.  RTC 2 weeks.    Nigel Gamboa MD

## 2022-09-13 ENCOUNTER — PRENATAL OFFICE VISIT (OUTPATIENT)
Dept: OBGYN | Facility: CLINIC | Age: 32
End: 2022-09-13
Payer: COMMERCIAL

## 2022-09-13 VITALS
BODY MASS INDEX: 31.32 KG/M2 | SYSTOLIC BLOOD PRESSURE: 104 MMHG | TEMPERATURE: 98.2 F | RESPIRATION RATE: 16 BRPM | HEIGHT: 65 IN | DIASTOLIC BLOOD PRESSURE: 70 MMHG | WEIGHT: 188 LBS | HEART RATE: 98 BPM

## 2022-09-13 DIAGNOSIS — Z34.83 PRENATAL CARE, SUBSEQUENT PREGNANCY IN THIRD TRIMESTER: Primary | ICD-10-CM

## 2022-09-13 PROCEDURE — 99207 PR PRENATAL VISIT: CPT | Performed by: OBSTETRICS & GYNECOLOGY

## 2022-09-13 NOTE — PROGRESS NOTES
"[facility]    Return OB Note    CC: Return OB     Subjective:  Eliz is a 32 year old  at 35w1d   Denies vaginal bleeding, loss of fluid, or regular contractions. Good fetal movement.  Complaints today: More uncomfortable with advancing gestation.    Objective:  /70 (BP Location: Left arm, Patient Position: Chair, Cuff Size: Adult Regular)   Pulse 98   Temp 98.2  F (36.8  C) (Tympanic)   Resp 16   Ht 1.651 m (5' 5\")   Wt 85.3 kg (188 lb)   LMP 01/10/2022 (Approximate)   BMI 31.28 kg/m      Fundal height: 36cm  FHT: 150bpm    Assessment/Plan:   Encounter Diagnosis   Name Primary?     Prenatal care, subsequent pregnancy in third trimester Yes       IUP at 35w1d  -Mid trimester labs: mild anemia, failed 1 hr GCT, passed 3 hr GTT  -S/p TDap  -Strict return precautions given    RTC 1 weeks    Aleshia Pak DO    "

## 2022-09-13 NOTE — NURSING NOTE
"Initial /70 (BP Location: Left arm, Patient Position: Chair, Cuff Size: Adult Regular)   Pulse 98   Temp 98.2  F (36.8  C) (Tympanic)   Resp 16   Ht 1.651 m (5' 5\")   Wt 85.3 kg (188 lb)   LMP 01/10/2022 (Approximate)   BMI 31.28 kg/m   Estimated body mass index is 31.28 kg/m  as calculated from the following:    Height as of this encounter: 1.651 m (5' 5\").    Weight as of this encounter: 85.3 kg (188 lb). .      "

## 2022-09-20 ENCOUNTER — PRENATAL OFFICE VISIT (OUTPATIENT)
Dept: OBGYN | Facility: CLINIC | Age: 32
End: 2022-09-20
Payer: COMMERCIAL

## 2022-09-20 VITALS
SYSTOLIC BLOOD PRESSURE: 112 MMHG | BODY MASS INDEX: 31.45 KG/M2 | DIASTOLIC BLOOD PRESSURE: 62 MMHG | TEMPERATURE: 97.3 F | WEIGHT: 189 LBS | HEART RATE: 84 BPM

## 2022-09-20 DIAGNOSIS — Z34.83 PRENATAL CARE, SUBSEQUENT PREGNANCY IN THIRD TRIMESTER: Primary | ICD-10-CM

## 2022-09-20 LAB
ERYTHROCYTE [DISTWIDTH] IN BLOOD BY AUTOMATED COUNT: 12.9 % (ref 10–15)
HCT VFR BLD AUTO: 34.9 % (ref 35–47)
HGB BLD-MCNC: 11.4 G/DL (ref 11.7–15.7)
MCH RBC QN AUTO: 31.1 PG (ref 26.5–33)
MCHC RBC AUTO-ENTMCNC: 32.7 G/DL (ref 31.5–36.5)
MCV RBC AUTO: 95 FL (ref 78–100)
PLATELET # BLD AUTO: 146 10E3/UL (ref 150–450)
RBC # BLD AUTO: 3.67 10E6/UL (ref 3.8–5.2)
WBC # BLD AUTO: 8.6 10E3/UL (ref 4–11)

## 2022-09-20 PROCEDURE — 85027 COMPLETE CBC AUTOMATED: CPT | Performed by: OBSTETRICS & GYNECOLOGY

## 2022-09-20 PROCEDURE — 36415 COLL VENOUS BLD VENIPUNCTURE: CPT | Performed by: OBSTETRICS & GYNECOLOGY

## 2022-09-20 PROCEDURE — 87653 STREP B DNA AMP PROBE: CPT | Performed by: OBSTETRICS & GYNECOLOGY

## 2022-09-20 PROCEDURE — 99207 PR PRENATAL VISIT: CPT | Performed by: OBSTETRICS & GYNECOLOGY

## 2022-09-20 NOTE — PROGRESS NOTES
[facility]   OB/GYN Clinic     CC: Return OB      Subjective:     Eliz is a 31 year old  at 36w1d who presents for return OB visit. She reports feeling well. +BH. NO VB or LOF. Active baby.      Objective:  /62 (BP Location: Right arm, Patient Position: Chair, Cuff Size: Adult Large)   Pulse 84   Temp 97.3  F (36.3  C) (Tympanic)   Wt 85.7 kg (189 lb)   LMP 01/10/2022 (Approximate)   Breastfeeding No   BMI 31.45 kg/m       Physical Exam:  Gen: Pleasant, talkative female in no apparent distress   Respiratory: breathing comfortably on room air   Cardiac: Warm and well-perfused.   GI: Abd soft and non-tender, gravid  MSK: Grossly normal movement of all four extremities  Psych: mood and affect bright   Lower extremity: edema not present      See OB flowsheet     Assessment/Plan:   31 year old  at 36w1d who presents for follow-up OB visit.   Healthy pregnancy.   Plan CBC for anemia, plts check.   RTC weekly until delivery. Precautions reviewed. Wants epidural, delayed cord clamping, skin to skin, breastfeeding. OB triage # given.      Linda Land MD  OB/GYN

## 2022-09-20 NOTE — NURSING NOTE
"Initial /62 (BP Location: Right arm, Patient Position: Chair, Cuff Size: Adult Large)   Pulse 84   Temp 97.3  F (36.3  C) (Tympanic)   Wt 85.7 kg (189 lb)   LMP 01/10/2022 (Approximate)   Breastfeeding No   BMI 31.45 kg/m   Estimated body mass index is 31.45 kg/m  as calculated from the following:    Height as of 9/13/22: 1.651 m (5' 5\").    Weight as of this encounter: 85.7 kg (189 lb). .    Carmencita Mascorro MA    "

## 2022-09-21 LAB — GP B STREP DNA SPEC QL NAA+PROBE: NEGATIVE

## 2022-09-28 ENCOUNTER — PRENATAL OFFICE VISIT (OUTPATIENT)
Dept: OBGYN | Facility: CLINIC | Age: 32
End: 2022-09-28
Payer: COMMERCIAL

## 2022-09-28 VITALS
SYSTOLIC BLOOD PRESSURE: 109 MMHG | DIASTOLIC BLOOD PRESSURE: 67 MMHG | BODY MASS INDEX: 31.75 KG/M2 | TEMPERATURE: 97.9 F | RESPIRATION RATE: 18 BRPM | HEART RATE: 78 BPM | HEIGHT: 65 IN | WEIGHT: 190.6 LBS

## 2022-09-28 DIAGNOSIS — Z3A.37 37 WEEKS GESTATION OF PREGNANCY: Primary | ICD-10-CM

## 2022-09-28 PROCEDURE — 99207 PR PRENATAL VISIT: CPT | Performed by: OBSTETRICS & GYNECOLOGY

## 2022-09-28 NOTE — PROGRESS NOTES
"[facility]    Return OB Note    CC: Return OB     Subjective:  Eliz is a 32 year old   at 37w2d   Denies vaginal bleeding, loss of fluid, or regular contractions. Pos fetal movement. No headache, visual disturbance or RUQ pain.  Complaints today: wants to discuss elective induction.    Objective:  /67 (BP Location: Right arm, Patient Position: Chair, Cuff Size: Adult Regular)   Pulse 78   Temp 97.9  F (36.6  C) (Tympanic)   Resp 18   Ht 1.651 m (5' 5\")   Wt 86.5 kg (190 lb 9.6 oz)   LMP 01/10/2022 (Approximate)   Breastfeeding No   BMI 31.72 kg/m      See flowsheet      Assessment/Plan:       32 year old year old  female with IUP at 37w2d  Encounter Diagnosis   Name Primary?     37 weeks gestation of pregnancy Yes       Wants epidural, delayed cord clamping, skin to skin, breastfeeding.  GBS neg   Last hemoglobin normal, Plt 146  Wants to have her membranes stripped.  Discussed possibly stripping membranes next visit.  Current cervical exam is 1.5/50/ballotable.  Asking about elective induction of labor.  Advised that our protocol for elective induction is that she needs to be 39 weeks and have a favorable cervix.  Recommended recheck next visit.  Return precautions given  Discussed labor, rupture of membranes and preeclampsia precautions.  Discussed fetal movement precautions.    RTC 1 weeks    Armida Garrido MD    " Pt requesting port access

## 2022-09-28 NOTE — NURSING NOTE
"Initial /67 (BP Location: Right arm, Patient Position: Chair, Cuff Size: Adult Regular)   Pulse 78   Temp 97.9  F (36.6  C) (Tympanic)   Resp 18   Ht 1.651 m (5' 5\")   Wt 86.5 kg (190 lb 9.6 oz)   LMP 01/10/2022 (Approximate)   Breastfeeding No   BMI 31.72 kg/m   Estimated body mass index is 31.72 kg/m  as calculated from the following:    Height as of this encounter: 1.651 m (5' 5\").    Weight as of this encounter: 86.5 kg (190 lb 9.6 oz). .    Carla Wheatley CMA    "

## 2022-10-04 ENCOUNTER — PRENATAL OFFICE VISIT (OUTPATIENT)
Dept: OBGYN | Facility: CLINIC | Age: 32
End: 2022-10-04
Payer: COMMERCIAL

## 2022-10-04 VITALS
HEART RATE: 81 BPM | HEIGHT: 65 IN | TEMPERATURE: 98.7 F | RESPIRATION RATE: 16 BRPM | WEIGHT: 190 LBS | SYSTOLIC BLOOD PRESSURE: 110 MMHG | DIASTOLIC BLOOD PRESSURE: 68 MMHG | BODY MASS INDEX: 31.65 KG/M2

## 2022-10-04 DIAGNOSIS — Z34.83 PRENATAL CARE, SUBSEQUENT PREGNANCY IN THIRD TRIMESTER: Primary | ICD-10-CM

## 2022-10-04 PROCEDURE — 99207 PR PRENATAL VISIT: CPT | Performed by: OBSTETRICS & GYNECOLOGY

## 2022-10-04 NOTE — NURSING NOTE
"Initial /68 (BP Location: Left arm, Patient Position: Chair, Cuff Size: Adult Regular)   Pulse 81   Temp 98.7  F (37.1  C) (Tympanic)   Resp 16   Ht 1.651 m (5' 5\")   Wt 86.2 kg (190 lb)   LMP 01/10/2022 (Approximate)   BMI 31.62 kg/m   Estimated body mass index is 31.62 kg/m  as calculated from the following:    Height as of this encounter: 1.651 m (5' 5\").    Weight as of this encounter: 86.2 kg (190 lb). .      "

## 2022-10-04 NOTE — PROGRESS NOTES
"[facility]    Return OB Note    CC: Return OB     Subjective:  Eliz is a 32 year old  at 38w1d   Denies vaginal bleeding, loss of fluid, or regular contractions. Good fetal movement.  Complaints today: None    Objective:  /68 (BP Location: Left arm, Patient Position: Chair, Cuff Size: Adult Regular)   Pulse 81   Temp 98.7  F (37.1  C) (Tympanic)   Resp 16   Ht 1.651 m (5' 5\")   Wt 86.2 kg (190 lb)   LMP 01/10/2022 (Approximate)   BMI 31.62 kg/m      Fundal height: 38cm  FHT: 155bpm  SVE: 50/-2, posterior    Assessment/Plan:   Encounter Diagnosis   Name Primary?     Prenatal care, subsequent pregnancy in third trimester Yes       IUP at 38w1d  -Mid trimester labs: mild anemia, improved at 36 week check. Now mild gestational thrombocytopenia. Failed 1 hr GCT, passed 3 hr GTT  -S/p TDap  -GBS negative  -Strict return precautions given    Possibly interested in IOL if no labor by next week.    RTC 1 weeks    Aleshia Pak DO    "

## 2022-10-11 ENCOUNTER — PRENATAL OFFICE VISIT (OUTPATIENT)
Dept: OBGYN | Facility: CLINIC | Age: 32
End: 2022-10-11
Payer: COMMERCIAL

## 2022-10-11 VITALS
SYSTOLIC BLOOD PRESSURE: 105 MMHG | RESPIRATION RATE: 12 BRPM | DIASTOLIC BLOOD PRESSURE: 68 MMHG | WEIGHT: 191.2 LBS | TEMPERATURE: 97.9 F | HEIGHT: 65 IN | HEART RATE: 79 BPM | BODY MASS INDEX: 31.86 KG/M2

## 2022-10-11 DIAGNOSIS — Z34.83 PRENATAL CARE, SUBSEQUENT PREGNANCY IN THIRD TRIMESTER: Primary | ICD-10-CM

## 2022-10-11 PROCEDURE — 99207 PR PRENATAL VISIT: CPT | Performed by: OBSTETRICS & GYNECOLOGY

## 2022-10-11 RX ORDER — PROCHLORPERAZINE 25 MG
25 SUPPOSITORY, RECTAL RECTAL EVERY 12 HOURS PRN
Status: CANCELLED | OUTPATIENT
Start: 2022-10-11

## 2022-10-11 RX ORDER — NALOXONE HYDROCHLORIDE 0.4 MG/ML
0.4 INJECTION, SOLUTION INTRAMUSCULAR; INTRAVENOUS; SUBCUTANEOUS
Status: CANCELLED | OUTPATIENT
Start: 2022-10-11

## 2022-10-11 RX ORDER — IBUPROFEN 200 MG
800 TABLET ORAL
Status: CANCELLED | OUTPATIENT
Start: 2022-10-11 | End: 2022-10-16

## 2022-10-11 RX ORDER — OXYTOCIN 10 [USP'U]/ML
10 INJECTION, SOLUTION INTRAMUSCULAR; INTRAVENOUS
Status: CANCELLED | OUTPATIENT
Start: 2022-10-11

## 2022-10-11 RX ORDER — METOCLOPRAMIDE 10 MG/1
10 TABLET ORAL EVERY 6 HOURS PRN
Status: CANCELLED | OUTPATIENT
Start: 2022-10-11

## 2022-10-11 RX ORDER — METOCLOPRAMIDE HYDROCHLORIDE 5 MG/ML
10 INJECTION INTRAMUSCULAR; INTRAVENOUS EVERY 6 HOURS PRN
Status: CANCELLED | OUTPATIENT
Start: 2022-10-11

## 2022-10-11 RX ORDER — LIDOCAINE 40 MG/G
CREAM TOPICAL
Status: CANCELLED | OUTPATIENT
Start: 2022-10-11

## 2022-10-11 RX ORDER — NALOXONE HYDROCHLORIDE 0.4 MG/ML
0.2 INJECTION, SOLUTION INTRAMUSCULAR; INTRAVENOUS; SUBCUTANEOUS
Status: CANCELLED | OUTPATIENT
Start: 2022-10-11

## 2022-10-11 RX ORDER — METHYLERGONOVINE MALEATE 0.2 MG/ML
200 INJECTION INTRAVENOUS
Status: CANCELLED | OUTPATIENT
Start: 2022-10-11

## 2022-10-11 RX ORDER — MISOPROSTOL 200 UG/1
400 TABLET ORAL
Status: CANCELLED | OUTPATIENT
Start: 2022-10-11

## 2022-10-11 RX ORDER — MISOPROSTOL 200 UG/1
800 TABLET ORAL
Status: CANCELLED | OUTPATIENT
Start: 2022-10-11

## 2022-10-11 RX ORDER — PROCHLORPERAZINE MALEATE 10 MG
10 TABLET ORAL EVERY 6 HOURS PRN
Status: CANCELLED | OUTPATIENT
Start: 2022-10-11

## 2022-10-11 RX ORDER — TRANEXAMIC ACID 10 MG/ML
1 INJECTION, SOLUTION INTRAVENOUS EVERY 30 MIN PRN
Status: CANCELLED | OUTPATIENT
Start: 2022-10-11

## 2022-10-11 RX ORDER — ONDANSETRON 4 MG/1
4 TABLET, ORALLY DISINTEGRATING ORAL EVERY 6 HOURS PRN
Status: CANCELLED | OUTPATIENT
Start: 2022-10-11

## 2022-10-11 RX ORDER — KETOROLAC TROMETHAMINE 30 MG/ML
30 INJECTION, SOLUTION INTRAMUSCULAR; INTRAVENOUS
Status: CANCELLED | OUTPATIENT
Start: 2022-10-11 | End: 2022-10-16

## 2022-10-11 RX ORDER — CITRIC ACID/SODIUM CITRATE 334-500MG
30 SOLUTION, ORAL ORAL
Status: CANCELLED | OUTPATIENT
Start: 2022-10-11

## 2022-10-11 RX ORDER — TERBUTALINE SULFATE 1 MG/ML
0.25 INJECTION, SOLUTION SUBCUTANEOUS
Status: CANCELLED | OUTPATIENT
Start: 2022-10-11

## 2022-10-11 RX ORDER — SODIUM CHLORIDE, SODIUM LACTATE, POTASSIUM CHLORIDE, CALCIUM CHLORIDE 600; 310; 30; 20 MG/100ML; MG/100ML; MG/100ML; MG/100ML
INJECTION, SOLUTION INTRAVENOUS CONTINUOUS
Status: CANCELLED | OUTPATIENT
Start: 2022-10-11

## 2022-10-11 RX ORDER — OXYTOCIN/0.9 % SODIUM CHLORIDE 30/500 ML
100-340 PLASTIC BAG, INJECTION (ML) INTRAVENOUS CONTINUOUS PRN
Status: CANCELLED | OUTPATIENT
Start: 2022-10-11

## 2022-10-11 RX ORDER — OXYTOCIN/0.9 % SODIUM CHLORIDE 30/500 ML
340 PLASTIC BAG, INJECTION (ML) INTRAVENOUS CONTINUOUS PRN
Status: CANCELLED | OUTPATIENT
Start: 2022-10-11

## 2022-10-11 RX ORDER — SODIUM CHLORIDE, SODIUM LACTATE, POTASSIUM CHLORIDE, CALCIUM CHLORIDE 600; 310; 30; 20 MG/100ML; MG/100ML; MG/100ML; MG/100ML
INJECTION, SOLUTION INTRAVENOUS CONTINUOUS PRN
Status: CANCELLED | OUTPATIENT
Start: 2022-10-11

## 2022-10-11 RX ORDER — CARBOPROST TROMETHAMINE 250 UG/ML
250 INJECTION, SOLUTION INTRAMUSCULAR
Status: CANCELLED | OUTPATIENT
Start: 2022-10-11

## 2022-10-11 RX ORDER — OXYTOCIN/0.9 % SODIUM CHLORIDE 30/500 ML
1-24 PLASTIC BAG, INJECTION (ML) INTRAVENOUS CONTINUOUS
Status: CANCELLED | OUTPATIENT
Start: 2022-10-11

## 2022-10-11 RX ORDER — ONDANSETRON 2 MG/ML
4 INJECTION INTRAMUSCULAR; INTRAVENOUS EVERY 6 HOURS PRN
Status: CANCELLED | OUTPATIENT
Start: 2022-10-11

## 2022-10-11 NOTE — PROGRESS NOTES
"[facility]    Return OB Note    CC: Return OB     Subjective:  Eliz is a 32 year old  at 39w1d   Denies vaginal bleeding, loss of fluid, or regular contractions. Good fetal movement.  Complaints today: None    Objective:  /68 (BP Location: Left arm, Patient Position: Sitting, Cuff Size: Adult Regular)   Pulse 79   Temp 97.9  F (36.6  C) (Tympanic)   Resp 12   Ht 1.651 m (5' 5\")   Wt 86.7 kg (191 lb 3.2 oz)   LMP 01/10/2022 (Approximate)   BMI 31.82 kg/m      Fundal height: 38cm  FHT: 140bpm  SVE: /-2    Assessment/Plan:   Encounter Diagnosis   Name Primary?     Prenatal care, subsequent pregnancy in third trimester Yes       IUP at 39w1d  -Mid trimester labs: mild anemia, improved at 36 week check. Now mild gestational thrombocytopenia. Failed 1 hr GCT, passed 3 hr GTT  -S/p TDap  -GBS negative  -Strict return precautions given    -Elective IOL scheduled for 10/13    RTC post partum    Aleshia Pak DO      "

## 2022-10-11 NOTE — NURSING NOTE
"Initial /68 (BP Location: Left arm, Patient Position: Sitting, Cuff Size: Adult Regular)   Pulse 79   Temp 97.9  F (36.6  C) (Tympanic)   Resp 12   Ht 1.651 m (5' 5\")   Wt 86.7 kg (191 lb 3.2 oz)   LMP 01/10/2022 (Approximate)   BMI 31.82 kg/m   Estimated body mass index is 31.82 kg/m  as calculated from the following:    Height as of this encounter: 1.651 m (5' 5\").    Weight as of this encounter: 86.7 kg (191 lb 3.2 oz). .      "

## 2022-10-13 ENCOUNTER — ANESTHESIA (OUTPATIENT)
Dept: OBGYN | Facility: CLINIC | Age: 32
End: 2022-10-13
Payer: COMMERCIAL

## 2022-10-13 ENCOUNTER — HOSPITAL ENCOUNTER (INPATIENT)
Facility: CLINIC | Age: 32
LOS: 2 days | Discharge: HOME | End: 2022-10-15
Attending: OBSTETRICS & GYNECOLOGY | Admitting: OBSTETRICS & GYNECOLOGY
Payer: COMMERCIAL

## 2022-10-13 ENCOUNTER — ANESTHESIA EVENT (OUTPATIENT)
Dept: OBGYN | Facility: CLINIC | Age: 32
End: 2022-10-13
Payer: COMMERCIAL

## 2022-10-13 DIAGNOSIS — Z37.9 VACUUM-ASSISTED VAGINAL DELIVERY: ICD-10-CM

## 2022-10-13 PROBLEM — Z34.83 PRENATAL CARE, SUBSEQUENT PREGNANCY IN THIRD TRIMESTER: Status: ACTIVE | Noted: 2022-10-13

## 2022-10-13 LAB
ABO/RH(D): NORMAL
ANTIBODY SCREEN: NEGATIVE
ERYTHROCYTE [DISTWIDTH] IN BLOOD BY AUTOMATED COUNT: 13.4 % (ref 10–15)
HCT VFR BLD AUTO: 32.3 % (ref 35–47)
HGB BLD-MCNC: 10.9 G/DL (ref 11.7–15.7)
MCH RBC QN AUTO: 31 PG (ref 26.5–33)
MCHC RBC AUTO-ENTMCNC: 33.7 G/DL (ref 31.5–36.5)
MCV RBC AUTO: 92 FL (ref 78–100)
PLATELET # BLD AUTO: 126 10E3/UL (ref 150–450)
RBC # BLD AUTO: 3.52 10E6/UL (ref 3.8–5.2)
SARS-COV-2 RNA RESP QL NAA+PROBE: NEGATIVE
SPECIMEN EXPIRATION DATE: NORMAL
T PALLIDUM AB SER QL: NONREACTIVE
WBC # BLD AUTO: 8.7 10E3/UL (ref 4–11)

## 2022-10-13 PROCEDURE — 250N000011 HC RX IP 250 OP 636: Performed by: NURSE ANESTHETIST, CERTIFIED REGISTERED

## 2022-10-13 PROCEDURE — 370N000003 HC ANESTHESIA WARD SERVICE

## 2022-10-13 PROCEDURE — 86901 BLOOD TYPING SEROLOGIC RH(D): CPT | Performed by: OBSTETRICS & GYNECOLOGY

## 2022-10-13 PROCEDURE — 00HU33Z INSERTION OF INFUSION DEVICE INTO SPINAL CANAL, PERCUTANEOUS APPROACH: ICD-10-PCS | Performed by: OBSTETRICS & GYNECOLOGY

## 2022-10-13 PROCEDURE — 86850 RBC ANTIBODY SCREEN: CPT | Performed by: OBSTETRICS & GYNECOLOGY

## 2022-10-13 PROCEDURE — 85027 COMPLETE CBC AUTOMATED: CPT | Performed by: OBSTETRICS & GYNECOLOGY

## 2022-10-13 PROCEDURE — 120N000001 HC R&B MED SURG/OB

## 2022-10-13 PROCEDURE — 87635 SARS-COV-2 COVID-19 AMP PRB: CPT | Performed by: OBSTETRICS & GYNECOLOGY

## 2022-10-13 PROCEDURE — 0KQM0ZZ REPAIR PERINEUM MUSCLE, OPEN APPROACH: ICD-10-PCS | Performed by: OBSTETRICS & GYNECOLOGY

## 2022-10-13 PROCEDURE — 258N000003 HC RX IP 258 OP 636: Performed by: NURSE ANESTHETIST, CERTIFIED REGISTERED

## 2022-10-13 PROCEDURE — 250N000009 HC RX 250: Performed by: OBSTETRICS & GYNECOLOGY

## 2022-10-13 PROCEDURE — 36415 COLL VENOUS BLD VENIPUNCTURE: CPT | Performed by: OBSTETRICS & GYNECOLOGY

## 2022-10-13 PROCEDURE — 250N000009 HC RX 250: Performed by: NURSE ANESTHETIST, CERTIFIED REGISTERED

## 2022-10-13 PROCEDURE — 3E0R3BZ INTRODUCTION OF ANESTHETIC AGENT INTO SPINAL CANAL, PERCUTANEOUS APPROACH: ICD-10-PCS | Performed by: OBSTETRICS & GYNECOLOGY

## 2022-10-13 PROCEDURE — 250N000013 HC RX MED GY IP 250 OP 250 PS 637: Performed by: OBSTETRICS & GYNECOLOGY

## 2022-10-13 PROCEDURE — 3E033VJ INTRODUCTION OF OTHER HORMONE INTO PERIPHERAL VEIN, PERCUTANEOUS APPROACH: ICD-10-PCS | Performed by: OBSTETRICS & GYNECOLOGY

## 2022-10-13 PROCEDURE — 722N000001 HC LABOR CARE VAGINAL DELIVERY SINGLE

## 2022-10-13 PROCEDURE — 258N000003 HC RX IP 258 OP 636: Performed by: OBSTETRICS & GYNECOLOGY

## 2022-10-13 PROCEDURE — 86780 TREPONEMA PALLIDUM: CPT | Performed by: OBSTETRICS & GYNECOLOGY

## 2022-10-13 RX ORDER — LIDOCAINE HYDROCHLORIDE AND EPINEPHRINE 15; 5 MG/ML; UG/ML
INJECTION, SOLUTION EPIDURAL PRN
Status: DISCONTINUED | OUTPATIENT
Start: 2022-10-13 | End: 2022-10-13

## 2022-10-13 RX ORDER — OXYTOCIN 10 [USP'U]/ML
10 INJECTION, SOLUTION INTRAMUSCULAR; INTRAVENOUS
Status: DISCONTINUED | OUTPATIENT
Start: 2022-10-13 | End: 2022-10-14

## 2022-10-13 RX ORDER — TERBUTALINE SULFATE 1 MG/ML
0.25 INJECTION, SOLUTION SUBCUTANEOUS
Status: DISCONTINUED | OUTPATIENT
Start: 2022-10-13 | End: 2022-10-14

## 2022-10-13 RX ORDER — METOCLOPRAMIDE HYDROCHLORIDE 5 MG/ML
10 INJECTION INTRAMUSCULAR; INTRAVENOUS EVERY 6 HOURS PRN
Status: DISCONTINUED | OUTPATIENT
Start: 2022-10-13 | End: 2022-10-14

## 2022-10-13 RX ORDER — ESCITALOPRAM OXALATE 20 MG/1
20 TABLET ORAL DAILY
Status: DISCONTINUED | OUTPATIENT
Start: 2022-10-13 | End: 2022-10-15 | Stop reason: HOSPADM

## 2022-10-13 RX ORDER — NALOXONE HYDROCHLORIDE 0.4 MG/ML
0.2 INJECTION, SOLUTION INTRAMUSCULAR; INTRAVENOUS; SUBCUTANEOUS
Status: DISCONTINUED | OUTPATIENT
Start: 2022-10-13 | End: 2022-10-14

## 2022-10-13 RX ORDER — LIDOCAINE 40 MG/G
CREAM TOPICAL
Status: DISCONTINUED | OUTPATIENT
Start: 2022-10-13 | End: 2022-10-14

## 2022-10-13 RX ORDER — OXYTOCIN/0.9 % SODIUM CHLORIDE 30/500 ML
100-340 PLASTIC BAG, INJECTION (ML) INTRAVENOUS CONTINUOUS PRN
Status: DISCONTINUED | OUTPATIENT
Start: 2022-10-13 | End: 2022-10-14

## 2022-10-13 RX ORDER — IBUPROFEN 800 MG/1
800 TABLET, FILM COATED ORAL
Status: DISCONTINUED | OUTPATIENT
Start: 2022-10-13 | End: 2022-10-15 | Stop reason: HOSPADM

## 2022-10-13 RX ORDER — EPHEDRINE SULFATE 50 MG/ML
5 INJECTION, SOLUTION INTRAMUSCULAR; INTRAVENOUS; SUBCUTANEOUS
Status: DISCONTINUED | OUTPATIENT
Start: 2022-10-13 | End: 2022-10-14

## 2022-10-13 RX ORDER — KETOROLAC TROMETHAMINE 30 MG/ML
30 INJECTION, SOLUTION INTRAMUSCULAR; INTRAVENOUS
Status: DISCONTINUED | OUTPATIENT
Start: 2022-10-13 | End: 2022-10-15 | Stop reason: HOSPADM

## 2022-10-13 RX ORDER — METHYLERGONOVINE MALEATE 0.2 MG/ML
200 INJECTION INTRAVENOUS
Status: DISCONTINUED | OUTPATIENT
Start: 2022-10-13 | End: 2022-10-15 | Stop reason: HOSPADM

## 2022-10-13 RX ORDER — CARBOPROST TROMETHAMINE 250 UG/ML
250 INJECTION, SOLUTION INTRAMUSCULAR
Status: DISCONTINUED | OUTPATIENT
Start: 2022-10-13 | End: 2022-10-14

## 2022-10-13 RX ORDER — SODIUM CHLORIDE, SODIUM LACTATE, POTASSIUM CHLORIDE, CALCIUM CHLORIDE 600; 310; 30; 20 MG/100ML; MG/100ML; MG/100ML; MG/100ML
INJECTION, SOLUTION INTRAVENOUS CONTINUOUS PRN
Status: DISCONTINUED | OUTPATIENT
Start: 2022-10-13 | End: 2022-10-14

## 2022-10-13 RX ORDER — ONDANSETRON 4 MG/1
4 TABLET, ORALLY DISINTEGRATING ORAL EVERY 6 HOURS PRN
Status: DISCONTINUED | OUTPATIENT
Start: 2022-10-13 | End: 2022-10-14

## 2022-10-13 RX ORDER — PROCHLORPERAZINE MALEATE 10 MG
10 TABLET ORAL EVERY 6 HOURS PRN
Status: DISCONTINUED | OUTPATIENT
Start: 2022-10-13 | End: 2022-10-14

## 2022-10-13 RX ORDER — NALOXONE HYDROCHLORIDE 0.4 MG/ML
0.4 INJECTION, SOLUTION INTRAMUSCULAR; INTRAVENOUS; SUBCUTANEOUS
Status: DISCONTINUED | OUTPATIENT
Start: 2022-10-13 | End: 2022-10-14

## 2022-10-13 RX ORDER — CITRIC ACID/SODIUM CITRATE 334-500MG
30 SOLUTION, ORAL ORAL
Status: DISCONTINUED | OUTPATIENT
Start: 2022-10-13 | End: 2022-10-14

## 2022-10-13 RX ORDER — MISOPROSTOL 200 UG/1
400 TABLET ORAL
Status: DISCONTINUED | OUTPATIENT
Start: 2022-10-13 | End: 2022-10-15 | Stop reason: HOSPADM

## 2022-10-13 RX ORDER — OXYTOCIN/0.9 % SODIUM CHLORIDE 30/500 ML
1-24 PLASTIC BAG, INJECTION (ML) INTRAVENOUS CONTINUOUS
Status: DISCONTINUED | OUTPATIENT
Start: 2022-10-13 | End: 2022-10-14

## 2022-10-13 RX ORDER — METOCLOPRAMIDE 10 MG/1
10 TABLET ORAL EVERY 6 HOURS PRN
Status: DISCONTINUED | OUTPATIENT
Start: 2022-10-13 | End: 2022-10-14

## 2022-10-13 RX ORDER — PROCHLORPERAZINE 25 MG
25 SUPPOSITORY, RECTAL RECTAL EVERY 12 HOURS PRN
Status: DISCONTINUED | OUTPATIENT
Start: 2022-10-13 | End: 2022-10-14

## 2022-10-13 RX ORDER — SODIUM CHLORIDE, SODIUM LACTATE, POTASSIUM CHLORIDE, CALCIUM CHLORIDE 600; 310; 30; 20 MG/100ML; MG/100ML; MG/100ML; MG/100ML
INJECTION, SOLUTION INTRAVENOUS CONTINUOUS
Status: DISCONTINUED | OUTPATIENT
Start: 2022-10-13 | End: 2022-10-14

## 2022-10-13 RX ORDER — ONDANSETRON 2 MG/ML
4 INJECTION INTRAMUSCULAR; INTRAVENOUS EVERY 6 HOURS PRN
Status: DISCONTINUED | OUTPATIENT
Start: 2022-10-13 | End: 2022-10-14

## 2022-10-13 RX ORDER — NALBUPHINE HYDROCHLORIDE 10 MG/ML
2.5-5 INJECTION, SOLUTION INTRAMUSCULAR; INTRAVENOUS; SUBCUTANEOUS EVERY 6 HOURS PRN
Status: DISCONTINUED | OUTPATIENT
Start: 2022-10-13 | End: 2022-10-14

## 2022-10-13 RX ORDER — MISOPROSTOL 200 UG/1
800 TABLET ORAL
Status: DISCONTINUED | OUTPATIENT
Start: 2022-10-13 | End: 2022-10-15 | Stop reason: HOSPADM

## 2022-10-13 RX ORDER — OXYTOCIN/0.9 % SODIUM CHLORIDE 30/500 ML
340 PLASTIC BAG, INJECTION (ML) INTRAVENOUS CONTINUOUS PRN
Status: DISCONTINUED | OUTPATIENT
Start: 2022-10-13 | End: 2022-10-14

## 2022-10-13 RX ORDER — TRANEXAMIC ACID 10 MG/ML
1 INJECTION, SOLUTION INTRAVENOUS EVERY 30 MIN PRN
Status: DISCONTINUED | OUTPATIENT
Start: 2022-10-13 | End: 2022-10-15 | Stop reason: HOSPADM

## 2022-10-13 RX ADMIN — SODIUM CHLORIDE, POTASSIUM CHLORIDE, SODIUM LACTATE AND CALCIUM CHLORIDE: 600; 310; 30; 20 INJECTION, SOLUTION INTRAVENOUS at 23:40

## 2022-10-13 RX ADMIN — Medication 10 ML/HR: at 16:06

## 2022-10-13 RX ADMIN — ESCITALOPRAM OXALATE 20 MG: 20 TABLET ORAL at 20:03

## 2022-10-13 RX ADMIN — SODIUM CHLORIDE, POTASSIUM CHLORIDE, SODIUM LACTATE AND CALCIUM CHLORIDE 250 ML: 600; 310; 30; 20 INJECTION, SOLUTION INTRAVENOUS at 13:22

## 2022-10-13 RX ADMIN — Medication: at 22:15

## 2022-10-13 RX ADMIN — SODIUM CHLORIDE, POTASSIUM CHLORIDE, SODIUM LACTATE AND CALCIUM CHLORIDE 1000 ML: 600; 310; 30; 20 INJECTION, SOLUTION INTRAVENOUS at 15:51

## 2022-10-13 RX ADMIN — LIDOCAINE HYDROCHLORIDE AND EPINEPHRINE 3 ML: 15; 5 INJECTION, SOLUTION EPIDURAL at 11:02

## 2022-10-13 RX ADMIN — SODIUM CHLORIDE, POTASSIUM CHLORIDE, SODIUM LACTATE AND CALCIUM CHLORIDE: 600; 310; 30; 20 INJECTION, SOLUTION INTRAVENOUS at 08:55

## 2022-10-13 RX ADMIN — Medication 10 ML/HR: at 15:53

## 2022-10-13 RX ADMIN — Medication 2 MILLI-UNITS/MIN: at 08:55

## 2022-10-13 ASSESSMENT — ACTIVITIES OF DAILY LIVING (ADL)
TOILETING_ISSUES: NO
DIFFICULTY_EATING/SWALLOWING: NO
ADLS_ACUITY_SCORE: 20
FALL_HISTORY_WITHIN_LAST_SIX_MONTHS: NO
ADLS_ACUITY_SCORE: 20
ADLS_ACUITY_SCORE: 20
DRESSING/BATHING_DIFFICULTY: NO
CHANGE_IN_FUNCTIONAL_STATUS_SINCE_ONSET_OF_CURRENT_ILLNESS/INJURY: NO
WALKING_OR_CLIMBING_STAIRS_DIFFICULTY: NO
WEAR_GLASSES_OR_BLIND: YES
VISION_MANAGEMENT: GLASSES
ADLS_ACUITY_SCORE: 20
CONCENTRATING,_REMEMBERING_OR_MAKING_DECISIONS_DIFFICULTY: NO
ADLS_ACUITY_SCORE: 20
DOING_ERRANDS_INDEPENDENTLY_DIFFICULTY: NO
ADLS_ACUITY_SCORE: 20
ADLS_ACUITY_SCORE: 35
ADLS_ACUITY_SCORE: 20
ADLS_ACUITY_SCORE: 20

## 2022-10-13 NOTE — ANESTHESIA PROCEDURE NOTES
"Epidural catheter Procedure Note    Pre-Procedure   Staff -        CRNA: Kane Zacarias APRN CRNA       Performed By: CRNA       Location: OB       Procedure Start/Stop Times: 10/13/2022 10:56 AM and 10/13/2022 11:02 AM       Pre-Anesthestic Checklist: patient identified, IV checked, risks and benefits discussed, informed consent, monitors and equipment checked, pre-op evaluation, at physician/surgeon's request and post-op pain management  Procedure Documentation  Procedure: epidural catheter       Diagnosis: labor pain       Patient Position: sitting       Skin prep: Chloraprep       Local skin infiltrated with 2 mL of 1% lidocaine.        Insertion Site: L4-5. (midline approach).       Technique: LORT saline        FARHAN at 7 cm.       Needle Type: [place] needle       Needle Gauge: 17.        Needle Length (Inches): 3.5        Catheter: 19 G.          Catheter threaded easily.         7 cm epidural space.         Threaded 14 cm at skin.         # of attempts: 1 and  # of redirects:  0    Assessment/Narrative         Paresthesias: No.       Test dose of 3 mL lidocaine 1.5% w/ 1:200,000 epinephrine at 11:02 CDT.         Test dose negative, 3 minutes after injection, for signs of intravascular, subdural, or intrathecal injection.       Insertion/Infusion Method: LORT saline       Aspiration negative for Heme or CSF via Epidural Catheter.    Medication(s) Administered   Medication Administration Time: 10/13/2022 10:56 AM      FOR [facility] ONLY:   Pain Team Contact information: please page the Pain Team Via [employer]. Search \"Pain\". During daytime hours, please page the attending first. At night please page the resident first.    "

## 2022-10-13 NOTE — ANESTHESIA PREPROCEDURE EVALUATION
Anesthesia Pre-Procedure Evaluation    Patient: Eliz Mckinney   MRN: [id number] : 1990        Procedure : * No procedures listed *          Past Medical History:   Diagnosis Date     Chickenpox      Gastritis     in high school     History of urinary tract infection      Other specified gastritis without mention of hemorrhage 2008     Postpartum depression 2019      Past Surgical History:   Procedure Laterality Date     NO HISTORY OF SURGERY        Allergies   Allergen Reactions     Nkda [No Known Drug Allergies]       Social History     Tobacco Use     Smoking status: Never     Smokeless tobacco: Never   Substance Use Topics     Alcohol use: Not Currently     Comment: occas-quit with pregnancy      Wt Readings from Last 1 Encounters:   10/13/22 87 kg (191 lb 12.8 oz)        Anesthesia Evaluation   Pt has not had prior anesthetic     No history of anesthetic complications       ROS/MED HX  ENT/Pulmonary:       Neurologic:       Cardiovascular:       METS/Exercise Tolerance:     Hematologic:       Musculoskeletal:       GI/Hepatic:       Renal/Genitourinary:       Endo:       Psychiatric/Substance Use:     (+) psychiatric history depression     Infectious Disease:       Malignancy:       Other:     (-) previous        Physical Exam    Airway  airway exam normal      Mallampati: II   TM distance: > 3 FB   Neck ROM: full   Mouth opening: > 3 cm    Respiratory Devices and Support         Dental  no notable dental history         Cardiovascular   cardiovascular exam normal          Pulmonary   pulmonary exam normal                OUTSIDE LABS:  CBC:   Lab Results   Component Value Date    WBC 8.7 10/13/2022    WBC 8.6 2022    HGB 10.9 (L) 10/13/2022    HGB 11.4 (L) 2022    HCT 32.3 (L) 10/13/2022    HCT 34.9 (L) 2022     (L) 10/13/2022     (L) 2022     BMP:   Lab Results   Component Value Date     (H) 2008     2007    POTASSIUM 4.3  03/11/2008    POTASSIUM 4.4 03/02/2007    CHLORIDE 108 03/11/2008    CHLORIDE 107 03/02/2007    CO2 26 03/11/2008    CO2 25 03/02/2007    BUN 13 03/11/2008    BUN 10 03/02/2007    CR 0.71 03/11/2008    CR 0.69 03/02/2007    GLC 71 03/11/2008    GLC 89 03/02/2007     COAGS:   Lab Results   Component Value Date    FIBR 492 (H) 07/25/2020     POC:   Lab Results   Component Value Date    HCG  10/09/2009     Negative   This test provides a presumptive diagnosis of pregnancy or non-pregnancy. A   confirmed pregnancy diagnosis should only be made by a physician after all   clinical and laboratory findings have been evaluated.    HCGS Negative 07/20/2017     HEPATIC:   Lab Results   Component Value Date    ALBUMIN 4.4 03/02/2007    PROTTOTAL 7.6 03/02/2007    ALT 69 (H) 03/02/2007    AST 62 (H) 03/02/2007    ALKPHOS 72 03/02/2007    BILITOTAL 0.3 03/02/2007     OTHER:   Lab Results   Component Value Date    COLIN 9.2 03/11/2008    TSH 1.17 07/20/2017    T4 0.94 07/20/2017       Anesthesia Plan    ASA Status:  2   - Procedure: Procedure only, no anesthetic delivered      Anesthesia Type: Epidural.              Consents    Anesthesia Plan(s) and associated risks, benefits, and realistic alternatives discussed. Questions answered and patient/representative(s) expressed understanding.     - Discussed: Risks, Benefits and Alternatives for BOTH SEDATION and the PROCEDURE were discussed     - Discussed with:  Patient         Postoperative Care    Pain management: Neuraxial analgesia.        Comments:                NICOLE Tomlin CRNA

## 2022-10-13 NOTE — PROGRESS NOTES
SROM at 1537, clear fluid. After SROM patient started getting uncomfortable. CRNA called and epidural was started.   SVE at 1630 was 4-5.   Patient was experiencing more pain on the left side so patient was repositioned on that side and pain improved.   Plan to recheck patient and empty bladder at 1830.

## 2022-10-13 NOTE — H&P
[facility] Labor and Delivery History and Physical    Eliz Mckinney MRN# [id number]   Age: 32 year old YOB: 1990     Date of Admission:  10/13/2022    Primary care provider: Analilia Michel           Chief Complaint:   Eliz Mckinney is a 32 year old female who is 39w3d pregnant and being admitted for induction of labor, indication maternal request.          Pregnancy history:     OBSTETRIC HISTORY:    OB History    Para Term  AB Living   2 1 1 0 0 1   SAB IAB Ectopic Multiple Live Births   0 0 0 0 1      # Outcome Date GA Lbr Krishan/2nd Weight Sex Delivery Anes PTL Lv   2 Current            1 Term 20 40w2d 15:57 / 01:50 3.204 kg (7 lb 1 oz) F Vag-Vacuum EPI Y KIM      Complications: Fetal Intolerance      Name: Kallie      Apgar1: 8  Apgar5: 9       EDC: Estimated Date of Delivery: 10/17/22    Prenatal Labs:   Lab Results   Component Value Date    ABO AB 2020    RH Pos 2020    AS Negative 10/13/2022    HEPBANG Nonreactive 2022    CHPCRT Negative 2019    GCPCRT Negative 2019    HGB 10.9 (L) 10/13/2022       GBS Status:   Lab Results   Component Value Date    GBS Negative 2020       Active Problem List  Patient Active Problem List   Diagnosis     CARDIOVASCULAR SCREENING; LDL GOAL LESS THAN 130     Adjustment disorder with mixed anxiety and depressed mood     Prenatal care, subsequent pregnancy     Prenatal care, subsequent pregnancy in third trimester       Medication Prior to Admission  Medications Prior to Admission   Medication Sig Dispense Refill Last Dose     Ascorbic Acid (VITAMIN C) 500 MG CAPS    Past Week     doxylamine (UNISOM) 12.5 mg TABS half-tab    10/12/2022     ESCITALOPRAM OXALATE PO Take 20 mg by mouth daily    10/12/2022     ferrous sulfate (FEROSUL) 325 (65 Fe) MG tablet    Past Week     hydrOXYzine (ATARAX) 25 MG tablet Take 25 mg by mouth 3 times daily as needed for anxiety   More than a  month     Prenatal MV-Min-Fe Fum-FA-DHA (PRENATAL+DHA PO) Take 1 tablet by mouth daily   10/12/2022   .        Maternal Past Medical History:     Past Medical History:   Diagnosis Date     Chickenpox      Gastritis     in high school     History of urinary tract infection      Other specified gastritis without mention of hemorrhage 03/19/2008     Postpartum depression 2019                       Family History:   I have reviewed this patient's family history            Social History:   I have reviewed this patient's social history         Review of Systems:   The Review of Systems is negative other than noted in the HPI          Physical Exam:   Vitals were reviewed  All vitals stable  Temp: 97.6  F (36.4  C) Temp src: Oral BP: 110/72 Pulse: 77   Resp: 18 SpO2: 99 % O2 Device: None (Room air)    Constitutional:   awake, alert, cooperative, no apparent distress, and appears stated age     Lungs:   No increased work of breathing, good air exchange, clear to auscultation bilaterally, no crackles or wheezing     Cardiovascular:   Normal apical impulse, regular rate and rhythm, normal S1 and S2, no S3 or S4, and no murmur noted     Abdomen:   No scars, normal bowel sounds, soft, non-distended, non-tender, no masses palpated, no hepatosplenomegally     Genitounirinary:   External Genitalia:  General appearance; normal     Musculoskeletal:   There is no redness, warmth, or swelling of the joints.  Full range of motion noted.  Motor strength is 5 out of 5 all extremities bilaterally.  Tone is normal.     Neurologic:   Awake, alert, oriented to name, place and time.  Cranial nerves II-XII are grossly intact.  Motor is 5 out of 5 bilaterally.  Cerebellar finger to nose, heel to shin intact.  Sensory is intact.  Babinski down going, Romberg negative, and gait is normal.     Neuropsychiatric:   General: normal, calm and normal eye contact  Level of consciousness: alert / normal  Affect: normal  Orientation: oriented to self,  place, time and situation  Memory and insight: normal, memory for past and recent events intact and thought process normal     Skin:   no bruising or bleeding, normal skin color, texture, turgor and no redness, warmth, or swelling      Cervix:   Membranes: SROM   Dilation: 10   Effacement: 100%   Station:+2   Consistency: soft   Position: Mid  Presentation:Cephalic  Fetal Heart Rate Tracing: Tier 2 (indeterminate) second stage decelerations  Tocometer: external monitor and frequency q 4-5 minutes                       Assessment:   Eliz Mckinney is a 39w3d pregnant female admitted with induction of labor, indication maternal request.          Plan:   Admit - see IP orders  Labor induction with Pitocin  Anticipate   Pain control via Epidural    Nigel Gamboa MD

## 2022-10-13 NOTE — PLAN OF CARE
Patient arrived at 0700 for an elective induction. Oriented to room and call light. Plan of care explained. Birth plan in chart. IV was placed, labs were drawn, covid test collected. Guan of a 5. Plan to start pitocin.

## 2022-10-14 PROBLEM — Z34.83 PRENATAL CARE, SUBSEQUENT PREGNANCY IN THIRD TRIMESTER: Status: RESOLVED | Noted: 2022-10-13 | Resolved: 2022-10-14

## 2022-10-14 PROBLEM — Z37.9 VACUUM-ASSISTED VAGINAL DELIVERY: Status: ACTIVE | Noted: 2022-10-14

## 2022-10-14 PROBLEM — D64.9 ACUTE ON CHRONIC ANEMIA: Status: ACTIVE | Noted: 2022-10-14

## 2022-10-14 LAB — HGB BLD-MCNC: 10.6 G/DL (ref 11.7–15.7)

## 2022-10-14 PROCEDURE — 250N000013 HC RX MED GY IP 250 OP 250 PS 637: Performed by: OBSTETRICS & GYNECOLOGY

## 2022-10-14 PROCEDURE — 36415 COLL VENOUS BLD VENIPUNCTURE: CPT | Performed by: OBSTETRICS & GYNECOLOGY

## 2022-10-14 PROCEDURE — 85018 HEMOGLOBIN: CPT | Performed by: OBSTETRICS & GYNECOLOGY

## 2022-10-14 PROCEDURE — 120N000001 HC R&B MED SURG/OB

## 2022-10-14 PROCEDURE — 59400 OBSTETRICAL CARE: CPT | Performed by: OBSTETRICS & GYNECOLOGY

## 2022-10-14 RX ORDER — CARBOPROST TROMETHAMINE 250 UG/ML
250 INJECTION, SOLUTION INTRAMUSCULAR
Status: DISCONTINUED | OUTPATIENT
Start: 2022-10-14 | End: 2022-10-15 | Stop reason: HOSPADM

## 2022-10-14 RX ORDER — PRENATAL VIT/IRON FUM/FOLIC AC 27MG-0.8MG
1 TABLET ORAL DAILY
Status: DISCONTINUED | OUTPATIENT
Start: 2022-10-15 | End: 2022-10-15 | Stop reason: HOSPADM

## 2022-10-14 RX ORDER — BISACODYL 10 MG
10 SUPPOSITORY, RECTAL RECTAL DAILY PRN
Status: DISCONTINUED | OUTPATIENT
Start: 2022-10-14 | End: 2022-10-15 | Stop reason: HOSPADM

## 2022-10-14 RX ORDER — HYDROCORTISONE 25 MG/G
CREAM TOPICAL 3 TIMES DAILY PRN
Status: DISCONTINUED | OUTPATIENT
Start: 2022-10-14 | End: 2022-10-15 | Stop reason: HOSPADM

## 2022-10-14 RX ORDER — MODIFIED LANOLIN
OINTMENT (GRAM) TOPICAL
Status: DISCONTINUED | OUTPATIENT
Start: 2022-10-14 | End: 2022-10-15 | Stop reason: HOSPADM

## 2022-10-14 RX ORDER — OXYTOCIN 10 [USP'U]/ML
10 INJECTION, SOLUTION INTRAMUSCULAR; INTRAVENOUS
Status: DISCONTINUED | OUTPATIENT
Start: 2022-10-14 | End: 2022-10-15 | Stop reason: HOSPADM

## 2022-10-14 RX ORDER — TRANEXAMIC ACID 10 MG/ML
1 INJECTION, SOLUTION INTRAVENOUS EVERY 30 MIN PRN
Status: DISCONTINUED | OUTPATIENT
Start: 2022-10-14 | End: 2022-10-15 | Stop reason: HOSPADM

## 2022-10-14 RX ORDER — IBUPROFEN 800 MG/1
800 TABLET, FILM COATED ORAL EVERY 6 HOURS PRN
Status: DISCONTINUED | OUTPATIENT
Start: 2022-10-14 | End: 2022-10-15 | Stop reason: HOSPADM

## 2022-10-14 RX ORDER — MISOPROSTOL 200 UG/1
800 TABLET ORAL
Status: DISCONTINUED | OUTPATIENT
Start: 2022-10-14 | End: 2022-10-15 | Stop reason: HOSPADM

## 2022-10-14 RX ORDER — ACETAMINOPHEN 325 MG/1
650 TABLET ORAL EVERY 4 HOURS PRN
Status: DISCONTINUED | OUTPATIENT
Start: 2022-10-14 | End: 2022-10-15 | Stop reason: HOSPADM

## 2022-10-14 RX ORDER — MISOPROSTOL 200 UG/1
400 TABLET ORAL
Status: DISCONTINUED | OUTPATIENT
Start: 2022-10-14 | End: 2022-10-15 | Stop reason: HOSPADM

## 2022-10-14 RX ORDER — METHYLERGONOVINE MALEATE 0.2 MG/ML
200 INJECTION INTRAVENOUS
Status: DISCONTINUED | OUTPATIENT
Start: 2022-10-14 | End: 2022-10-15 | Stop reason: HOSPADM

## 2022-10-14 RX ORDER — OXYTOCIN/0.9 % SODIUM CHLORIDE 30/500 ML
340 PLASTIC BAG, INJECTION (ML) INTRAVENOUS CONTINUOUS PRN
Status: DISCONTINUED | OUTPATIENT
Start: 2022-10-14 | End: 2022-10-15 | Stop reason: HOSPADM

## 2022-10-14 RX ORDER — DOCUSATE SODIUM 100 MG/1
100 CAPSULE, LIQUID FILLED ORAL DAILY
Status: DISCONTINUED | OUTPATIENT
Start: 2022-10-14 | End: 2022-10-15 | Stop reason: HOSPADM

## 2022-10-14 RX ADMIN — DOCUSATE SODIUM 100 MG: 100 CAPSULE, LIQUID FILLED ORAL at 08:20

## 2022-10-14 RX ADMIN — IBUPROFEN 800 MG: 800 TABLET ORAL at 14:40

## 2022-10-14 RX ADMIN — IBUPROFEN 800 MG: 800 TABLET ORAL at 21:00

## 2022-10-14 RX ADMIN — IBUPROFEN 800 MG: 800 TABLET ORAL at 02:28

## 2022-10-14 RX ADMIN — MISOPROSTOL 400 MCG: 200 TABLET ORAL at 00:03

## 2022-10-14 RX ADMIN — ESCITALOPRAM OXALATE 20 MG: 20 TABLET ORAL at 18:36

## 2022-10-14 RX ADMIN — IBUPROFEN 800 MG: 800 TABLET ORAL at 08:21

## 2022-10-14 ASSESSMENT — ACTIVITIES OF DAILY LIVING (ADL)
ADLS_ACUITY_SCORE: 20

## 2022-10-14 NOTE — PLAN OF CARE
Pt transferred to post postpartum at 215am .VSS bleeding light, able to void independently. Able to ambulate independently.

## 2022-10-14 NOTE — PLAN OF CARE
Stable postpartum course. Lochia rubra WNL. VSS. Bonding with . Breastfeeding, and pumping . Infant with IV .

## 2022-10-14 NOTE — PLAN OF CARE
S:Delivery  B:Induced  Labor,39w4d    Lab Results   Component Value Date    GBS Negative 2020    with antibiotic treatment not indicated 4 hours prior to delivery.  A: Patient delivered VAVD  lac 1st degree at 2351 with Dr. TRUDY Gamboa in attendance and baby placed on mother's abdomen for immediate cord clamping. Baby to warmer for assessment/resusitative efforts.. Placed skin to skin @ 0105.. Apgars 3,3,5,6 .Delivery .  IV infusion of Oxytocin  infused. Placenta removal spontaneous. MD does not want placenta sent to pathology.  See Flowsheet for VS and PP checks.  .  Labor care plan goals met, transition now to postpartum care. Postpartum QBL PENDING   R: Expect routine postpartum care. Anticipate first feeding within the hour or whenever infant displays feeding cues. Continue skin to skin. Prior discussion with mother indicates that feeding plan is Breast feeding . Educated mother on importance of exclusive breastfeeding, expected feeding readiness cues and encouraged her to observe for these cues while rooming in. Informed her that breastfeeding assistance would be provided.

## 2022-10-14 NOTE — PROGRESS NOTES
[facility] OB/GYN Department    Post-Partum Progress Note: PPD #1    Name: Eliz Mckinney  Date: 10/14/2022    Subjective:   Patient seen and examined.  Cramping with breastfeeding. Pain well controlled on PO medications.  Tolerating regular diet, without nausea or vomiting.  Ambulating and voiding without difficulty.  Lochia moderate.  Breast feeding with some supplementation due to hypoglycemia.     ROS:    General/Constitutional:  Denies chills or fever  Respiratory: Denies shortness of breath  Cardiovascular: Denies chest pain  Gastrointestinal:  +mild uterine cramping, no nausea or vomiting  Genitourinary: Denies difficulty urinating  Musculoskeletal: Denies peripheral edema      Objective:     Intake/Output Summary (Last 24 hours) at 10/14/2022 1452  Last data filed at 10/14/2022 0219  Gross per 24 hour   Intake 500 ml   Output 659 ml   Net -159 ml       Patient Vitals for the past 24 hrs:   BP Temp Temp src Pulse Resp SpO2   10/14/22 0820 110/71 98.8  F (37.1  C) Oral 80 16 97 %   10/14/22 0411 110/61 98.1  F (36.7  C) Oral 82 16 95 %   10/14/22 0200 117/67 -- -- -- -- --   10/14/22 0145 115/63 -- -- -- -- --   10/14/22 0130 115/62 -- -- -- -- --   10/14/22 0116 121/64 -- -- -- -- --   10/14/22 0100 121/74 -- -- -- -- --   10/14/22 0045 126/75 -- -- -- -- --   10/14/22 0030 128/75 97.8  F (36.6  C) Oral -- -- --   10/14/22 0015 132/78 -- -- -- -- --   10/13/22 2145 110/72 97.6  F (36.4  C) Oral 77 18 99 %   10/13/22 2023 124/79 97.8  F (36.6  C) Oral -- 16 --   10/13/22 1931 116/79 97.4  F (36.3  C) Oral -- 16 --   10/13/22 1840 116/61 97.8  F (36.6  C) Oral 65 16 98 %   10/13/22 1745 106/61 97.8  F (36.6  C) Oral 76 16 96 %   10/13/22 1715 109/60 -- -- 78 16 100 %   10/13/22 1645 105/66 97.9  F (36.6  C) Oral 70 16 100 %   10/13/22 1640 101/57 -- -- 71 16 100 %   10/13/22 1635 104/60 -- -- 68 16 100 %   10/13/22 1630 126/81 -- -- 70 16 100 %   10/13/22 1625 117/75 -- -- 68 16 100 %   10/13/22  1620 114/73 -- -- 70 16 100 %   10/13/22 1615 118/73 -- -- 72 16 100 %   10/13/22 1610 119/80 -- -- 78 16 100 %   10/13/22 1604 116/75 -- -- 74 16 100 %   10/13/22 1602 118/78 -- -- 69 16 100 %   10/13/22 1600 115/73 97.9  F (36.6  C) Oral 70 16 100 %   10/13/22 1558 120/64 -- -- 67 18 100 %   10/13/22 1556 132/67 -- -- 69 16 100 %   10/13/22 1500 118/76 97.8  F (36.6  C) Oral -- 16 --       Recent Labs   Lab 10/14/22  0543 10/13/22  0833   HGB 10.6* 10.9*       General appearance: well-hydrated, A&O x 3, no apparent distress  ENT: EOMI, sclera anicteric   Lungs: Equal expansion bilaterally, no accessory muscle use  Heart: No heaves or thrills. No peripheral varicosities  Constitutional: See vitals  Abdomen: Soft, non-distended, no rebound or rigidity   Uterus: Firm below umbilicus with non-tender fundus   Neurologic: CN II-XII grossly intact, no lateralizing defects, no gross movement abnormalities  Extremities: trace edema, no calf tenderness    Assessment and Plan:    Vacuum-assisted vaginal delivery  PPD#1 s/p VA-VD  Routine post partum care  Optimize pain management  Perineal care  Lactation support  Anticipate discharge home tomorrow      Acute on chronic anemia  Acute blood loss anemia on chronic iron deficiency anemia in pregnancy  Mild anemia, patient asymptomatic  Continue prenatal with iron    Aleshia Pak DO

## 2022-10-14 NOTE — ASSESSMENT & PLAN NOTE
PPD#2 s/p VA-VD  Routine post partum care  Optimize pain management  Perineal care  Lactation support  Anticipate discharge today, possible boarder status with baby still admitted

## 2022-10-14 NOTE — ANESTHESIA POSTPROCEDURE EVALUATION
Patient: Eliz Mckinney    Procedure: * No procedures listed *       Anesthesia Type:  Epidural    Note:  Disposition: Inpatient   Postop Pain Control: Uneventful            Sign Out: Well controlled pain   PONV: No   Neuro/Psych: Uneventful            Sign Out: Acceptable/Baseline neuro status   Airway/Respiratory: Uneventful            Sign Out: Acceptable/Baseline resp. status   CV/Hemodynamics: Uneventful            Sign Out: Acceptable CV status; No obvious hypovolemia; No obvious fluid overload   Other NRE: NONE   DID A NON-ROUTINE EVENT OCCUR? No           Last vitals:  Vitals:    10/14/22 0145 10/14/22 0200 10/14/22 0411   BP: 115/63 117/67 110/61   Pulse:   82   Resp:   16   Temp:   36.7  C (98.1  F)   SpO2:   95%       Electronically Signed By: NICOLE Tomlin CRNA  October 14, 2022  7:48 AM

## 2022-10-14 NOTE — H&P
[facility] Labor and Delivery History and Physical    Eliz Mckinney MRN# [id number]   Age: 32 year old YOB: 1990     Date of Admission:  10/13/2022    Primary care provider: Analilia Michel           Chief Complaint:   Eliz Mckinney is a 32 year old female who is 39w3d pregnant and being admitted for induction of labor, indication maternal request.          Pregnancy history:     OBSTETRIC HISTORY:    OB History    Para Term  AB Living   2 1 1 0 0 1   SAB IAB Ectopic Multiple Live Births   0 0 0 0 1      # Outcome Date GA Lbr Krishan/2nd Weight Sex Delivery Anes PTL Lv   2 Current            1 Term 20 40w2d 15:57 / 01:50 3.204 kg (7 lb 1 oz) F Vag-Vacuum EPI Y KIM      Complications: Fetal Intolerance      Name: Kallie      Apgar1: 8  Apgar5: 9       EDC: Estimated Date of Delivery: 10/17/22    Prenatal Labs:   Lab Results   Component Value Date    ABO AB 2020    RH Pos 2020    AS Negative 10/13/2022    HEPBANG Nonreactive 2022    CHPCRT Negative 2019    GCPCRT Negative 2019    HGB 10.9 (L) 10/13/2022       GBS Status:   Lab Results   Component Value Date    GBS Negative 2020       Active Problem List  Patient Active Problem List   Diagnosis     CARDIOVASCULAR SCREENING; LDL GOAL LESS THAN 130     Adjustment disorder with mixed anxiety and depressed mood     Prenatal care, subsequent pregnancy     Prenatal care, subsequent pregnancy in third trimester       Medication Prior to Admission  Medications Prior to Admission   Medication Sig Dispense Refill Last Dose     Ascorbic Acid (VITAMIN C) 500 MG CAPS    Past Week     doxylamine (UNISOM) 12.5 mg TABS half-tab    10/12/2022     ESCITALOPRAM OXALATE PO Take 20 mg by mouth daily    10/12/2022     ferrous sulfate (FEROSUL) 325 (65 Fe) MG tablet    Past Week     hydrOXYzine (ATARAX) 25 MG tablet Take 25 mg by mouth 3 times daily as needed for anxiety   More than a  month     Prenatal MV-Min-Fe Fum-FA-DHA (PRENATAL+DHA PO) Take 1 tablet by mouth daily   10/12/2022   .        Maternal Past Medical History:     Past Medical History:   Diagnosis Date     Chickenpox      Gastritis     in high school     History of urinary tract infection      Other specified gastritis without mention of hemorrhage 03/19/2008     Postpartum depression 2019                       Family History:   I have reviewed this patient's family history            Social History:   I have reviewed this patient's social history         Review of Systems:   The Review of Systems is negative other than noted in the HPI          Physical Exam:   Vitals were reviewed  All vitals stable  Temp: 97.4  F (36.3  C) Temp src: Oral BP: 116/79 Pulse: 65   Resp: 16 SpO2: 98 % O2 Device: None (Room air)    Constitutional:   awake, alert, cooperative, no apparent distress, and appears stated age     Lungs:   No increased work of breathing, good air exchange, clear to auscultation bilaterally, no crackles or wheezing     Cardiovascular:   Normal apical impulse, regular rate and rhythm, normal S1 and S2, no S3 or S4, and no murmur noted     Abdomen:   No scars, normal bowel sounds, soft, non-distended, non-tender, no masses palpated, no hepatosplenomegaly, Gravid     Genitounirinary:   External Genitalia:  General appearance; normal     Musculoskeletal:   There is no redness, warmth, or swelling of the joints.  Full range of motion noted.  Motor strength is 5 out of 5 all extremities bilaterally.  Tone is normal.     Neurologic:   Awake, alert, oriented to name, place and time.  Cranial nerves II-XII are grossly intact.  Motor is 5 out of 5 bilaterally.  Cerebellar finger to nose, heel to shin intact.  Sensory is intact.  Babinski down going, Romberg negative, and gait is normal.     Neuropsychiatric:   General: normal, calm and normal eye contact  Level of consciousness: alert / normal  Affect: normal  Orientation: oriented to  self, place, time and situation  Memory and insight: normal, memory for past and recent events intact and thought process normal     Skin:   no bruising or bleeding, normal skin color, texture, turgor, no redness, warmth, or swelling and no rashes      Cervix:   Membranes: SROM   Dilation: 8   Effacement: 80%   Station:-1   Consistency: soft   Position: Mid  Presentation:Cephalic  Fetal Heart Rate Tracing: reactive and reassuring, Tier 1 (normal)  Tocometer: external monitor and adequate                       Assessment:   Eliz Mckniney is a 39w3d pregnant female admitted with induction of labor, indication maternal request.          Plan:   Admit - see IP orders  Labor induction with Pitocin  Pain medication Epidural    Anticipate     Nigel Gamboa MD

## 2022-10-14 NOTE — PROGRESS NOTES
FHT showing prolonged bradycardic spells with pushing. Pt repositioned multiple times and attempted pushing on back, side lying on right, side lying on left and back to her back.  Pt was also given 250 LR bolus X2. Pitocin was turned off at the discretion of RN and MD was notified, pitocin was turned on at 1/2 the rate (8 mary ann/unit/ min) when provider came to bedside, pt pushed with RN and provider continuing to have presumed prolonged decels (strip broken). MD provided scalp stimulation. The decision  was made to attempt vacuum delivery. Vacuum delivery successful,

## 2022-10-14 NOTE — L&D DELIVERY NOTE
"Eliz Mckinney is a 32 year old  @ 39+4 Weeks EGA  She presented for Elective IOL on 10/13/2022  Pregnancy was complicated by hx of PP depression  GBS negative  Membrane status: SROM @ 15:37 on 10/13/2022  Labored with:  Pitocin to max 8 mary ann [employer] units/ min   Pain meds Epidural   FHR Cat II  I was called into the room to evaluate prolonged bradycardic spells with pushing  Pitocin @ 8, becky q 4-5 min intermittent bradycardia to the 80's responded to scalp stimulation.  We discussed the option of a vacuum delivery- as there was minimal descent with spaced out contractions @ +2 station  They agreed to attempt the vacuum delivery.  NP was summoned and Eliz's bladder was drained.  The vacuum was applied with a contraction - the application was visualized and steady progress was made with pushing.  Delivered a viable male  @ 23:51 over intact perineum  Weight 8#1 oz   APGAR's 3/3/5/6/8  Placenta delivered @ 23:56 , was complete with a 3 vessel cord. cord gasses sent  Venous Ph 7.23 BE -4.7    Laceration 2nd degree  Repaired with 3/O vicryl    ml  \"Jeff[place]\"    Nigel Gamboa MD      "

## 2022-10-15 VITALS
BODY MASS INDEX: 31.92 KG/M2 | WEIGHT: 191.8 LBS | OXYGEN SATURATION: 96 % | SYSTOLIC BLOOD PRESSURE: 125 MMHG | HEART RATE: 81 BPM | RESPIRATION RATE: 16 BRPM | TEMPERATURE: 98 F | DIASTOLIC BLOOD PRESSURE: 73 MMHG

## 2022-10-15 PROCEDURE — 250N000013 HC RX MED GY IP 250 OP 250 PS 637: Performed by: OBSTETRICS & GYNECOLOGY

## 2022-10-15 RX ORDER — IBUPROFEN 800 MG/1
800 TABLET, FILM COATED ORAL EVERY 6 HOURS PRN
Qty: 30 TABLET | Refills: 0 | Status: SHIPPED | OUTPATIENT
Start: 2022-10-15 | End: 2022-12-21

## 2022-10-15 RX ORDER — DOCUSATE SODIUM 100 MG/1
100 CAPSULE, LIQUID FILLED ORAL 2 TIMES DAILY PRN
Qty: 30 CAPSULE | Refills: 0 | Status: SHIPPED | OUTPATIENT
Start: 2022-10-15 | End: 2022-12-21

## 2022-10-15 RX ADMIN — IBUPROFEN 800 MG: 800 TABLET ORAL at 07:06

## 2022-10-15 RX ADMIN — DOCUSATE SODIUM 100 MG: 100 CAPSULE, LIQUID FILLED ORAL at 08:51

## 2022-10-15 RX ADMIN — ACETAMINOPHEN 650 MG: 325 TABLET, FILM COATED ORAL at 00:45

## 2022-10-15 RX ADMIN — PRENATAL VITAMINS-IRON FUMARATE 27 MG IRON-FOLIC ACID 0.8 MG TABLET 1 TABLET: at 08:51

## 2022-10-15 RX ADMIN — IBUPROFEN 800 MG: 800 TABLET ORAL at 12:59

## 2022-10-15 ASSESSMENT — ACTIVITIES OF DAILY LIVING (ADL)
ADLS_ACUITY_SCORE: 20

## 2022-10-15 NOTE — DISCHARGE INSTRUCTIONS
Postpartum Vaginal Delivery Instructions    Activity     Ask family and friends for help when you need it.  Do not place anything in your vagina for 6 weeks.  You are not restricted on other activities, but take it easy for a few weeks to allow your body to recover from delivery.  You are able to do any activities you feel up to that point.  No driving until you have stopped taking your pain medications (usually two weeks after delivery).     Call your health care provider if you have any of these symptoms:     Increased pain, swelling, redness, or fluid around your stiches from an episiotomy or perineal tear.  A fever above 100.4 F (38 C) with or without chills when placing a thermometer under your tongue.  You soak a sanitary pad with blood within 1 hour, or you see blood clots larger than a golf ball.  Bleeding that lasts more than 6 weeks.  Vaginal discharge that smells bad.  Severe pain, cramping or tenderness in your lower belly area.  A need to urinate more frequently (use the toilet more often), more urgently (use the toilet very quickly), or it burns when you urinate.  Nausea and vomiting.  Redness, swelling or pain around a vein in your leg.  Problems breastfeeding or a red or painful area on your breast.  Chest pain and cough or are gasping for air.  Problems coping with sadness, anxiety, or depression.  If you have any concerns about hurting yourself or the baby, call your provider immediately.   You have questions or concerns after you return home.     Keep your hands clean:  Always wash your hands before touching your perineal area and stitches.  This helps reduce your risk of infection.  If your hands aren't dirty, you may use an alcohol hand-rub to clean your hands. Keep your nails clean and short.      If you you feel sad, have difficulty sleeping, feel overwhelmed, anxious or have troubling thoughts you may call your clinic, or the HelpLine for Pregnancy & Postpartum Support [place]. (PPS HelpLine  032-435-8447) or online resource list  @ [email or web address]

## 2022-10-15 NOTE — PLAN OF CARE
Assessments completed as charted. B/P: 114/74, T: 98.2, P: 62, R: 16. Rates pain: 4-5/10 on pain scale and reports pain is well controlled with PRN pain medications, see MAR. Voiding without difficulty. Fundus: Firm, midline, U/U. Lochia: Light. Activity: normal activity. Infant feeding: pt is breast feeding and supplementing with DBM. Able to position and latch baby independently.           Postpartum breastfeeding assessment completed and education provided, see Patient Education Activity.  Items included in the education are:   proper positioning and latch  effectiveness of feeding  manual expression  handling and storing breastmilk  maintenance of breastfeeding for the first 6 months  sign/symptoms of infant feeding issues requiring referral to qualified health care provider  Postpartum care education provided, see Patient Education activity. Patient denies needs at this time. Will Continue to monitor.     Goal Outcome Evaluation:      Plan of Care Reviewed With: patient, spouse    Overall Patient Progress: improvingOverall Patient Progress: improving

## 2022-10-15 NOTE — PLAN OF CARE
Patient discharged to boarder status.Infant remains as pt.  Discharge instructions given. Encouraged to call for any problems, questions or concerns. RXs none.

## 2022-10-15 NOTE — DISCHARGE SUMMARY
[facility] Discharge Summary    Eliz Mckinney MRN# [id number]   Age: 32 year old YOB: 1990     Date of Admission:  10/13/2022  Date of Discharge::  10/15/2022  Admitting Physician:  Nigel Gamboa MD  Discharge Physician:  Aleshia Pak DO     Home clinic: [facility]          Admission Diagnoses:   Prenatal care, subsequent pregnancy in third trimester [Z34.83]          Discharge Diagnosis:   Vacuum assisted vaginal delivery  Acute on chronic anemia  Depression          Procedures:   Procedure(s): VA-VD       No other procedures performed during this admission           Medications Prior to Admission:     Medications Prior to Admission   Medication Sig Dispense Refill Last Dose     Ascorbic Acid (VITAMIN C) 500 MG CAPS    Past Week     doxylamine (UNISOM) 12.5 mg TABS half-tab    10/12/2022     ESCITALOPRAM OXALATE PO Take 20 mg by mouth daily    10/12/2022     ferrous sulfate (FEROSUL) 325 (65 Fe) MG tablet    Past Week     hydrOXYzine (ATARAX) 25 MG tablet Take 25 mg by mouth 3 times daily as needed for anxiety   More than a month     Prenatal MV-Min-Fe Fum-FA-DHA (PRENATAL+DHA PO) Take 1 tablet by mouth daily   10/12/2022             Discharge Medications:     Current Discharge Medication List      START taking these medications    Details   docusate sodium (COLACE) 100 MG capsule Take 1 capsule (100 mg) by mouth 2 times daily as needed for constipation  Qty: 30 capsule, Refills: 0    Associated Diagnoses: Vacuum-assisted vaginal delivery      ibuprofen (ADVIL/MOTRIN) 800 MG tablet Take 1 tablet (800 mg) by mouth every 6 hours as needed for other (cramping)  Qty: 30 tablet, Refills: 0    Associated Diagnoses: Vacuum-assisted vaginal delivery         CONTINUE these medications which have NOT CHANGED    Details   Ascorbic Acid (VITAMIN C) 500 MG CAPS       doxylamine (UNISOM) 12.5 mg TABS half-tab       ESCITALOPRAM OXALATE PO Take 20 mg by mouth  "daily       ferrous sulfate (FEROSUL) 325 (65 Fe) MG tablet       hydrOXYzine (ATARAX) 25 MG tablet Take 25 mg by mouth 3 times daily as needed for anxiety      Prenatal MV-Min-Fe Fum-FA-DHA (PRENATAL+DHA PO) Take 1 tablet by mouth daily                   Consultations:   No consultations were requested during this admission          Brief History of Labor:   Eliz Mckinney is a 32 year old  @ 39+4 Weeks EGA  She presented for Elective IOL on 10/13/2022  Pregnancy was complicated by hx of PP depression  GBS negative  Membrane status: SROM @ 15:37 on 10/13/2022  Labored with:  Pitocin to max 8 mary ann [employer] units/ min   Pain meds Epidural   FHR Cat II  I was called into the room to evaluate prolonged bradycardic spells with pushing  Pitocin @ 8, becky q 4-5 min intermittent bradycardia to the 80's responded to scalp stimulation.  We discussed the option of a vacuum delivery- as there was minimal descent with spaced out contractions @ +2 station  They agreed to attempt the vacuum delivery.  NP was summoned and Eliz's bladder was drained.  The vacuum was applied with a contraction - the application was visualized and steady progress was made with pushing.  Delivered a viable male  @ 23:51 over intact perineum  Weight 8#1 oz   APGAR's 3/3/5/6/8  Placenta delivered @ 23:56 , was complete with a 3 vessel cord. cord gasses sent  Venous Ph 7.23 BE -4.7     Laceration 2nd degree  Repaired with 3/O vicryl    ml  \"[place]\"           Hospital Course:   The patient's hospital course was unremarkable.  On discharge, her pain was well controlled. Vaginal bleeding is similar to peak menstrual flow.  Voiding without difficulty.  Ambulating well and tolerating a normal diet.  No fever.  Breastfeeding/pumping well.  Infant is stable.  She was discharged on post-partum day #2.    Post-partum hemoglobin:   Hemoglobin   Date Value Ref Range Status   10/14/2022 10.6 (L) 11.7 - 15.7 g/dL Final "   07/26/2020 8.8 (L) 11.7 - 15.7 g/dL Final             Discharge Instructions and Follow-Up:   Discharge diet: Regular   Discharge activity: Pelvic rest: abstain from intercourse and do not use tampons for 6 week(s)   Discharge follow-up: Follow up with OBGYN in 6 weeks   Wound care: Ice to area for comfort           Discharge Disposition:   Discharged to home, possible boarder status if baby not discharge same day      Attestation:  I have reviewed today's vital signs, notes, medications, labs and imaging.    Aleshia Pak DO

## 2022-10-15 NOTE — PROGRESS NOTES
[facility] OB/GYN Department    Post-Partum Progress Note: PPD #2    Name: Eliz Mckinney  Date: 10/15/2022    Subjective:   Patient seen and examined. Patient tearful this morning. Baby has been on IV D10 and patient is concerned for baby and wanting to go home to be with daughter. She is doing well physically. Pain well controlled on PO medications.  Tolerating regular diet, without nausea or vomiting.  Ambulating and voiding without difficulty.  Lochia moderate.  Breast feeding/pumping.     ROS:    General/Constitutional:  Denies chills or fever  Respiratory: Denies shortness of breath  Cardiovascular: Denies chest pain  Gastrointestinal:  +mild uterine cramping, no nausea or vomiting  Genitourinary: Denies difficulty urinating  Musculoskeletal: Denies peripheral edema      Objective:   No intake or output data in the 24 hours ending 10/15/22 1018    Patient Vitals for the past 24 hrs:   BP Temp Temp src Pulse Resp SpO2   10/15/22 0853 125/73 98  F (36.7  C) Oral 81 16 --   10/15/22 0241 114/74 98.2  F (36.8  C) Oral 62 16 --   10/14/22 1621 111/69 98.3  F (36.8  C) Oral -- 16 96 %       Recent Labs   Lab 10/14/22  0543 10/13/22  0833   HGB 10.6* 10.9*       General appearance: well-hydrated, A&O x 3, no apparent distress  ENT: EOMI, sclera anicteric   Lungs: Equal expansion bilaterally, no accessory muscle use  Heart: No heaves or thrills. No peripheral varicosities  Constitutional: See vitals  Abdomen: Soft, non-distended, no rebound or rigidity   Uterus: Firm at umbilicus with non-tender fundus   Neurologic: CN II-XII grossly intact, no lateralizing defects, no gross movement abnormalities  Extremities: no edema, no calf tenderness    Assessment and Plan:    Vacuum-assisted vaginal delivery  PPD#2 s/p VA-VD  Routine post partum care  Optimize pain management  Perineal care  Lactation support  Anticipate discharge today, possible boarder status with baby still admitted      Acute on chronic  anemia  Acute blood loss anemia on chronic iron deficiency anemia in pregnancy  Mild anemia, patient asymptomatic  Continue iron supplement and prenatal with iron    Adjustment disorder with mixed anxiety and depressed mood  Continue home dose Benjao    Aleshia Pak DO

## 2022-11-10 ENCOUNTER — MEDICAL CORRESPONDENCE (OUTPATIENT)
Dept: HEALTH INFORMATION MANAGEMENT | Facility: CLINIC | Age: 32
End: 2022-11-10

## 2022-11-19 ENCOUNTER — HEALTH MAINTENANCE LETTER (OUTPATIENT)
Age: 32
End: 2022-11-19

## 2022-12-13 ENCOUNTER — MEDICAL CORRESPONDENCE (OUTPATIENT)
Dept: HEALTH INFORMATION MANAGEMENT | Facility: CLINIC | Age: 32
End: 2022-12-13

## 2022-12-21 ENCOUNTER — PRENATAL OFFICE VISIT (OUTPATIENT)
Dept: OBGYN | Facility: CLINIC | Age: 32
End: 2022-12-21
Payer: COMMERCIAL

## 2022-12-21 VITALS
HEART RATE: 58 BPM | BODY MASS INDEX: 27.77 KG/M2 | HEIGHT: 65 IN | TEMPERATURE: 97.1 F | DIASTOLIC BLOOD PRESSURE: 59 MMHG | RESPIRATION RATE: 16 BRPM | WEIGHT: 166.7 LBS | SYSTOLIC BLOOD PRESSURE: 107 MMHG

## 2022-12-21 DIAGNOSIS — Z30.430 ENCOUNTER FOR INSERTION OF INTRAUTERINE CONTRACEPTIVE DEVICE: Primary | ICD-10-CM

## 2022-12-21 DIAGNOSIS — Z30.430 ENCOUNTER FOR INSERTION OF MIRENA IUD: ICD-10-CM

## 2022-12-21 PROCEDURE — 99207 PR POST PARTUM EXAM: CPT | Performed by: OBSTETRICS & GYNECOLOGY

## 2022-12-21 PROCEDURE — 58300 INSERT INTRAUTERINE DEVICE: CPT | Performed by: OBSTETRICS & GYNECOLOGY

## 2022-12-21 ASSESSMENT — ANXIETY QUESTIONNAIRES
GAD7 TOTAL SCORE: 3
5. BEING SO RESTLESS THAT IT IS HARD TO SIT STILL: NOT AT ALL
2. NOT BEING ABLE TO STOP OR CONTROL WORRYING: NOT AT ALL
6. BECOMING EASILY ANNOYED OR IRRITABLE: SEVERAL DAYS
1. FEELING NERVOUS, ANXIOUS, OR ON EDGE: SEVERAL DAYS
7. FEELING AFRAID AS IF SOMETHING AWFUL MIGHT HAPPEN: NOT AT ALL
IF YOU CHECKED OFF ANY PROBLEMS ON THIS QUESTIONNAIRE, HOW DIFFICULT HAVE THESE PROBLEMS MADE IT FOR YOU TO DO YOUR WORK, TAKE CARE OF THINGS AT HOME, OR GET ALONG WITH OTHER PEOPLE: SOMEWHAT DIFFICULT
GAD7 TOTAL SCORE: 3
3. WORRYING TOO MUCH ABOUT DIFFERENT THINGS: NOT AT ALL

## 2022-12-21 ASSESSMENT — PATIENT HEALTH QUESTIONNAIRE - PHQ9: 5. POOR APPETITE OR OVEREATING: SEVERAL DAYS

## 2022-12-21 NOTE — PROGRESS NOTES
"Initial /59 (BP Location: Right arm, Patient Position: Chair, Cuff Size: Adult Regular)   Pulse 58   Temp 97.1  F (36.2  C) (Tympanic)   Resp 16   Ht 1.651 m (5' 5\")   Wt 75.6 kg (166 lb 11.2 oz)   LMP 01/10/2022 (Approximate)   Breastfeeding Yes   BMI 27.74 kg/m   Estimated body mass index is 27.74 kg/m  as calculated from the following:    Height as of this encounter: 1.651 m (5' 5\").    Weight as of this encounter: 75.6 kg (166 lb 11.2 oz). .      "

## 2022-12-21 NOTE — PROGRESS NOTES
"[facility] OB/GYN    CC: Postpartum Visit    S: Pt doing well. She denies any complaints. Off any pain medications. Eating, drinking, urinating and moving bowels without any issues. Lochia has resolved. Mood is good.     O:   VS:   Patient Vitals for the past 24 hrs:   BP Temp Temp src Pulse Resp Height Weight   12/21/22 1426 107/59 97.1  F (36.2  C) Tympanic 58 16 1.651 m (5' 5\") 75.6 kg (166 lb 11.2 oz)     General: NAD  CV: Regular rate, warm and well perfused  Resp: breathing comfortably on room air   Abdomen: soft, non-tender, nondistended  Uterus anteverted   Well-healed perineum   Extremities: non-tender, non-edematous     A/P: 32year old now P2, 6wks pp  Appropriate postpartum recovery.   Plan for Mirena IUD for contraception.     Plan for routine GYN cares, PAP smear due in one year.     IUD Placement Procedure Note    Eliz Mckinney  1990  [id number]    The patient was counseled on the risks (including risk of infection, uterine perforation, cramping), benefits (high efficacy, low maintenance birth control, improvement in menstrual bleeding), and alternatives of the procedure. Verbal and written consent were obtained.  Abstinent since delivery.     Technique: The patient was placed in the dorsal lithotomy position.  A speculum was placed in the vagina and the cervix was cleaned with betadine swabsx3. The Mirena IUD was loaded, advanced to the fundus, pulled back 1cm, deployed and advanced to the fundus. Using the insertor as a sound, the uterus sounded to 8.0 cm. IUD strings were cut 3cm from the external cervical os. The patient tolerated the procedure well and there were no complications. EBL: 0cc.     Discussed option of returning to clinic for a string check versus self-checks.     Linda Land MD, MD  [facility] OB/GYN   12/21/2022 1:25 PM    "

## 2023-02-14 ENCOUNTER — MEDICAL CORRESPONDENCE (OUTPATIENT)
Dept: HEALTH INFORMATION MANAGEMENT | Facility: CLINIC | Age: 33
End: 2023-02-14
Payer: COMMERCIAL

## 2023-02-18 ENCOUNTER — TRANSFERRED RECORDS (OUTPATIENT)
Dept: HEALTH INFORMATION MANAGEMENT | Facility: CLINIC | Age: 33
End: 2023-02-18

## 2023-04-17 ENCOUNTER — MEDICAL CORRESPONDENCE (OUTPATIENT)
Dept: HEALTH INFORMATION MANAGEMENT | Facility: CLINIC | Age: 33
End: 2023-04-17
Payer: COMMERCIAL

## 2023-04-25 ENCOUNTER — MYC REFILL (OUTPATIENT)
Dept: FAMILY MEDICINE | Facility: CLINIC | Age: 33
End: 2023-04-25
Payer: COMMERCIAL

## 2023-04-26 RX ORDER — ESCITALOPRAM OXALATE 20 MG/1
20 TABLET ORAL DAILY
Qty: 30 TABLET | Refills: 1 | Status: SHIPPED | OUTPATIENT
Start: 2023-04-26 | End: 2023-06-07

## 2023-04-26 NOTE — TELEPHONE ENCOUNTER
Sent [employer] message to clarify dosage.    Shanta RN BSN  Triage Nurse  [facility]: [facility]  Ph: [phone]

## 2023-05-31 ASSESSMENT — ENCOUNTER SYMPTOMS: BREAST MASS: 0

## 2023-06-07 ENCOUNTER — OFFICE VISIT (OUTPATIENT)
Dept: FAMILY MEDICINE | Facility: CLINIC | Age: 33
End: 2023-06-07
Payer: COMMERCIAL

## 2023-06-07 VITALS
DIASTOLIC BLOOD PRESSURE: 64 MMHG | TEMPERATURE: 99 F | RESPIRATION RATE: 14 BRPM | BODY MASS INDEX: 28.32 KG/M2 | SYSTOLIC BLOOD PRESSURE: 110 MMHG | OXYGEN SATURATION: 97 % | HEART RATE: 75 BPM | WEIGHT: 170 LBS | HEIGHT: 65 IN

## 2023-06-07 DIAGNOSIS — Z11.59 NEED FOR HEPATITIS C SCREENING TEST: ICD-10-CM

## 2023-06-07 DIAGNOSIS — Z00.00 ROUTINE GENERAL MEDICAL EXAMINATION AT A HEALTH CARE FACILITY: Primary | ICD-10-CM

## 2023-06-07 PROCEDURE — 99213 OFFICE O/P EST LOW 20 MIN: CPT | Mod: 25 | Performed by: NURSE PRACTITIONER

## 2023-06-07 PROCEDURE — G0145 SCR C/V CYTO,THINLAYER,RESCR: HCPCS | Performed by: NURSE PRACTITIONER

## 2023-06-07 PROCEDURE — 87624 HPV HI-RISK TYP POOLED RSLT: CPT | Performed by: NURSE PRACTITIONER

## 2023-06-07 PROCEDURE — 99395 PREV VISIT EST AGE 18-39: CPT | Performed by: NURSE PRACTITIONER

## 2023-06-07 PROCEDURE — 36415 COLL VENOUS BLD VENIPUNCTURE: CPT | Performed by: NURSE PRACTITIONER

## 2023-06-07 PROCEDURE — 86803 HEPATITIS C AB TEST: CPT | Performed by: NURSE PRACTITIONER

## 2023-06-07 RX ORDER — ESCITALOPRAM OXALATE 20 MG/1
20 TABLET ORAL DAILY
Qty: 90 TABLET | Refills: 1 | Status: SHIPPED | OUTPATIENT
Start: 2023-06-07 | End: 2023-12-20

## 2023-06-07 ASSESSMENT — ENCOUNTER SYMPTOMS
DIARRHEA: 0
SLEEP DISTURBANCE: 0
DYSPHORIC MOOD: 0
LIGHT-HEADEDNESS: 0
COUGH: 0
FREQUENCY: 0
BREAST MASS: 0
SHORTNESS OF BREATH: 0
ARTHRALGIAS: 0
HEADACHES: 0
ABDOMINAL DISTENTION: 0
NAUSEA: 0
VOMITING: 0
CONSTIPATION: 0
POLYPHAGIA: 0
RHINORRHEA: 0
DIFFICULTY URINATING: 0
ACTIVITY CHANGE: 0
PALPITATIONS: 0
NUMBNESS: 0
DIZZINESS: 0
SORE THROAT: 0
ABDOMINAL PAIN: 0
FATIGUE: 0
CHEST TIGHTNESS: 0
NERVOUS/ANXIOUS: 0
WHEEZING: 0
POLYDIPSIA: 0

## 2023-06-07 ASSESSMENT — PAIN SCALES - GENERAL: PAINLEVEL: NO PAIN (0)

## 2023-06-07 NOTE — PROGRESS NOTES
SUBJECTIVE:   CC: Eliz is an 32 year old who presents for preventive health visit.     Healthy Habits:    Getting at least 3 servings of Calcium per day:  Yes    Bi-annual eye exam:  Yes    Dental care twice a year:  Yes    Sleep apnea or symptoms of sleep apnea:  None    Diet:  Regular (no restrictions)    Frequency of exercise:  None    Taking medications regularly:  Yes    Medication side effects:  Not applicable    PHQ-2 Total Score:    Additional concerns today:  No      Have you ever done Advance Care Planning? (For example, a Health Directive, POLST, or a discussion with a medical provider or your loved ones about your wishes): No, advance care planning information given to patient to review.  Patient plans to discuss their wishes with loved ones or provider.      Social History     Tobacco Use     Smoking status: Former     Packs/day: 0.00     Years: 4.00     Pack years: 0.00     Types: Cigarettes     Smokeless tobacco: Former     Quit date: 8/20/2010   Vaping Use     Vaping status: Never Used   Substance Use Topics     Alcohol use: Yes     Comment: occas-quit with pregnancy             5/31/2023     4:52 PM   Alcohol Use   Prescreen: >3 drinks/day or >7 drinks/week? Yes   AUDIT SCORE  11         5/31/2023     4:52 PM   AUDIT - Alcohol Use Disorders Identification Test - Reproduced from the World Health Organization Audit 2001 (Second Edition)   1.  How often do you have a drink containing alcohol? 2 to 3 times a week   2.  How many drinks containing alcohol do you have on a typical day when you are drinking? 1 or 2   3.  How often do you have five or more drinks on one occasion? Monthly   4.  How often during the last year have you found that you were not able to stop drinking once you had started? Never   5.  How often during the last year have you failed to do what was normally expected of you because of drinking? Never   6.  How often during the last year have you needed a first drink in the  morning to get yourself going after a heavy drinking session? Never   7.  How often during the last year have you had a feeling of guilt or remorse after drinking? Less than monthly   8.  How often during the last year have you been unable to remember what happened the night before because of your drinking? Less than monthly   9.  Have you or someone else been injured because of your drinking? No   10. Has a relative, friend, doctor or other health care worker been concerned about your drinking or suggested you cut down? Yes, during the last year   TOTAL SCORE 11     Reviewed orders with patient.  Reviewed health maintenance and updated orders accordingly - Yes  BP Readings from Last 3 Encounters:   23 110/64   22 107/59   10/15/22 125/73    Wt Readings from Last 3 Encounters:   23 77.1 kg (170 lb)   22 75.6 kg (166 lb 11.2 oz)   10/13/22 87 kg (191 lb 12.8 oz)              Breast Cancer Screenin/31/2023     4:52 PM   Breast CA Risk Assessment (FHS-7)   Do you have a family history of breast, colon, or ovarian cancer? No / Unknown         Patient under 40 years of age: Routine Mammogram Screening not recommended.   Pertinent mammograms are reviewed under the imaging tab.    History of abnormal Pap smear: NO - age 30-65 PAP every 5 years with negative HPV co-testing recommended      9/10/2020     2:12 PM 10/13/2017     3:41 PM 2014    12:00 AM   PAP / HPV   PAP (Historical) NIL   NIL   NIL       Reviewed and updated as needed this visit by clinical staff    Allergies  Meds  Problems             Reviewed and updated as needed this visit by Provider     Meds  Problems               Here today for physical. Had fasting labs for life insurance in Feb. brings copy in the clinic and to scanned to chart.  Labs reviewed and unremarkable.    Had been going to [facility]. Insurance is no longer covering. Takens for anxiety and depression. Has been off and on meds in the past. Abilify,  wellbutrin in the past. Has been on lexapro since 2017. Feels like it is helping and working. Post partum after daughter 3 years ago and incereased noe 20 mg. Son 8 mo and doing well. Has been to couseling in past. Does have prescription for hydroxyazine has used maybe 10 times since 2017.    Has mirena IUD. Does not usually get period. Did have some sopotting yesterday. Is still breast pumping.     Last Pap: 2020-normal. Never an abnormal.   Last mammogram: Never, no family history  Last BMD: N/A  Last Colonoscopy: Never, no family history  Last eye exam: yearly  Last dental exam: every 6 mo  Last tetanus vaccine: 8/22  Last influenza vaccine: Did not have  Last shingles vaccine:  N/A  Last pneumonia vaccine: N/A  Last COVID vaccine: Declines  Last COVID booster: Declines  Hep C screen: Plans to do here today  HIV screen: done  AAA screen (age 65-78 with smoking hx): N/A  IVD (HTN, Hyperlipid, Smoking): N/A  Lung CA screening (50-77, 20 pk smoking hx) Quit within 15 years: N/A      Review of Systems   Constitutional: Negative for activity change and fatigue.   HENT: Negative for ear pain, rhinorrhea and sore throat.    Respiratory: Negative for cough, chest tightness, shortness of breath and wheezing.    Cardiovascular: Negative for chest pain and palpitations.   Gastrointestinal: Negative for abdominal distention, abdominal pain, constipation, diarrhea, nausea and vomiting.   Endocrine: Negative for cold intolerance, heat intolerance, polydipsia, polyphagia and polyuria.   Breasts:  Negative for tenderness, breast mass and discharge.   Genitourinary: Negative for difficulty urinating, enuresis, frequency, pelvic pain, urgency, vaginal bleeding and vaginal discharge.   Musculoskeletal: Negative for arthralgias.   Skin: Negative for rash.   Neurological: Negative for dizziness, light-headedness, numbness and headaches.   Psychiatric/Behavioral: Negative for dysphoric mood and sleep disturbance. The patient is not  "nervous/anxious.         OBJECTIVE:   /64   Pulse 75   Temp 99  F (37.2  C) (Tympanic)   Resp 14   Ht 1.651 m (5' 5\")   Wt 77.1 kg (170 lb)   LMP  (LMP Unknown)   SpO2 97%   Breastfeeding No   BMI 28.29 kg/m    Physical Exam  Constitutional:       Appearance: Normal appearance. She is well-developed.   HENT:      Head: Normocephalic and atraumatic.      Right Ear: Tympanic membrane and external ear normal. No middle ear effusion.      Left Ear: Tympanic membrane and external ear normal.  No middle ear effusion.      Nose: No mucosal edema.      Mouth/Throat:      Pharynx: Uvula midline.   Eyes:      Pupils: Pupils are equal, round, and reactive to light.   Neck:      Thyroid: No thyromegaly.      Vascular: No carotid bruit.   Cardiovascular:      Rate and Rhythm: Normal rate and regular rhythm.      Pulses:           Femoral pulses are 2+ on the right side and 2+ on the left side.       Dorsalis pedis pulses are 2+ on the right side and 2+ on the left side.        Posterior tibial pulses are 2+ on the right side and 2+ on the left side.      Heart sounds: Normal heart sounds.   Pulmonary:      Effort: Pulmonary effort is normal.      Breath sounds: Normal breath sounds.   Chest:   Breasts:     Breasts are symmetrical.      Right: No inverted nipple, mass, nipple discharge, skin change or tenderness.      Left: No inverted nipple, mass, nipple discharge, skin change or tenderness.   Abdominal:      General: Bowel sounds are normal.      Palpations: Abdomen is soft.      Tenderness: There is no abdominal tenderness.   Genitourinary:     Labia:         Right: No lesion.         Left: No lesion.       Vagina: Normal. No vaginal discharge or erythema.      Cervix: No cervical motion tenderness or discharge.      Uterus: Not enlarged.       Adnexa:         Right: No mass or tenderness.          Left: No mass or tenderness.        Comments: IUD strings visualized  Musculoskeletal:         General: Normal " "range of motion.      Cervical back: Normal range of motion.   Skin:     General: Skin is warm and dry.      Findings: No rash.   Neurological:      Mental Status: She is alert.   Psychiatric:         Behavior: Behavior normal.         ASSESSMENT/PLAN:   Routine general medical examination at a health care facility  Screening guidelines reviewed.   - REVIEW OF HEALTH MAINTENANCE PROTOCOL ORDERS    Post partum depression  Stable-doing well on current.  Refills given.  Plan to follow-up in 6 months-May be virtual.  - escitalopram (LEXAPRO) 20 MG tablet; Take 1 tablet (20 mg) by mouth daily    Need for hepatitis C screening test  - Hepatitis C Screen Reflex to HCV RNA Quant and Genotype; Future        COUNSELING:  Reviewed preventive health counseling, as reflected in patient instructions      BMI:   Estimated body mass index is 28.29 kg/m  as calculated from the following:    Height as of this encounter: 1.651 m (5' 5\").    Weight as of this encounter: 77.1 kg (170 lb).         She reports that she has quit smoking. Her smoking use included cigarettes. She quit smokeless tobacco use about 12 years ago.    NICOLE Costa CNP  [facility]  "

## 2023-06-08 LAB — HCV AB SERPL QL IA: NONREACTIVE

## 2023-06-08 NOTE — PROGRESS NOTES
"S: Feels well.  Baby active. Stays at home with Kallie. Having some on the left inner groin area. Saw chiropractor and he gave her some stretches.  Denies uterine cramping, vaginal bleeding or leaking of fluid. Mood is stable with medications. Taking vitamin c and iron.  Plans to breastfeed and has a pump already.   O: Vitals: /61 (BP Location: Right arm, Patient Position: Chair, Cuff Size: Adult Regular)   Pulse 86   Temp 98.1  F (36.7  C) (Tympanic)   Resp 16   Ht 1.651 m (5' 5\")   Wt 84.6 kg (186 lb 6.4 oz)   LMP 01/10/2022 (Approximate)   BMI 31.02 kg/m    BMI= Body mass index is 31.02 kg/m .  Exam:  Constitutional: healthy, alert and no distress  Respiratory: respirations even and unlabored  Gastrointestinal: Abdomen soft, non-tender. Fundus measures appropriate for gestational age. Fetal heart tones hear without difficulty and within normal limits  : Deferred  Psychiatric: mentation appears normal and affect normal/bright  A:     ICD-10-CM    1. Encounter for supervision of other normal pregnancy in second trimester  Z34.82      P: Discussed plans for labor. Discussed patient options for postpartum contraception. Patient is planning IUD and thinking vasectomy.   Encouraged patient to call with any questions or concerns.  Return to clinic 2 weeks  Olga Monroy CNM                  "
Prior to immunization administration, verified patients identity using patient s name and date of birth. Please see Immunization Activity for additional information.     Screening Questionnaire for Adult Immunization    Are you sick today?   No   Do you have allergies to medications, food, a vaccine component or latex?   No   Have you ever had a serious reaction after receiving a vaccination?   No   Do you have a long-term health problem with heart, lung, kidney, or metabolic disease (e.g., diabetes), asthma, a blood disorder, no spleen, complement component deficiency, a cochlear implant, or a spinal fluid leak?  Are you on long-term aspirin therapy?   No   Do you have cancer, leukemia, HIV/AIDS, or any other immune system problem?   No   Do you have a parent, brother, or sister with an immune system problem?   No   In the past 3 months, have you taken medications that affect  your immune system, such as prednisone, other steroids, or anticancer drugs; drugs for the treatment of rheumatoid arthritis, Crohn s disease, or psoriasis; or have you had radiation treatments?   No   Have you had a seizure, or a brain or other nervous system problem?   No   During the past year, have you received a transfusion of blood or blood    products, or been given immune (gamma) globulin or antiviral drug?   No   For women: Are you pregnant or is there a chance you could become       pregnant during the next month?   Yes   Have you received any vaccinations in the past 4 weeks?   No     Immunization questionnaire was positive for at least one answer.  Notified Olga Monroy.        Per orders of Olga Monroy, injection of Adacel given by Netta Church LPN. Patient instructed to remain in clinic for 15 minutes afterwards, and to report any adverse reaction to me immediately.       Screening performed by Netta Church LPN on 8/3/2022 at 3:43 PM.    
177.8

## 2023-06-12 LAB
BKR LAB AP GYN ADEQUACY: NORMAL
BKR LAB AP GYN INTERPRETATION: NORMAL
BKR LAB AP HPV REFLEX: NORMAL
BKR LAB AP PREVIOUS ABNORMAL: NORMAL
PATH REPORT.COMMENTS IMP SPEC: NORMAL
PATH REPORT.COMMENTS IMP SPEC: NORMAL
PATH REPORT.RELEVANT HX SPEC: NORMAL

## 2023-06-13 LAB
HUMAN PAPILLOMA VIRUS 16 DNA: NEGATIVE
HUMAN PAPILLOMA VIRUS 18 DNA: NEGATIVE
HUMAN PAPILLOMA VIRUS FINAL DIAGNOSIS: NORMAL
HUMAN PAPILLOMA VIRUS OTHER HR: NEGATIVE

## 2023-09-16 NOTE — L&D DELIVERY NOTE
Eliz Mckinney is a 29 year old  presented at 40w2d in early labor. AROM completed for augmentation. She progressed spontaneously to 7cm and then failed to make change and was started on pitocin. During this time, patient had intermittent variable decelerations. She progressed to complete with 6 mu pit and began pushing in the setting of several prolonged decels. She did continue to have moderate variability with good return to baseline though out this. With initial pushing tracing did improve and thus vacuum was discussed but not completed. After an additional approximately 30-45 minutes of pushing, tracing was again noted to have late and prolonged decels and thus vacuum delivery was then recommended. See below note for details but in short, over 4 contractions with 2 pop offs fetus was brought to 3-4+ station. Given pop offs and continued moderate variability with return to baseline, continued pushing was undertaken. With this she did bring baby to a crown but over multiple pushes was not able to breakthrough or pass her perineum. Therefore given Cat II FHT, LML episiotomy 1.5 cm in length was cut to expedite delivery. She pushed effectively and delivered a viable female infant with Apgars 8/9 over a 2nd degree laceration. Weight is pending and skin-to-skin was performed. Delayed cord clamping was performed. Peds NP present for delivery. Cord was clamped and cut. Placenta delivered via active management and was noted to be intact with a three vessel cord. Shortly after delivery of the placenta patient noted to have dramatic uterine atony with brisk bleeding. She was given PO miso, IM methergine, IM hemabate and TXA with improvement in uterine tone. Several sweeps were completed to clear clots and thus 2g ancef will be given. This did significantly improve uterine.  Patient examined and noted to have bilateral sulcal tears that were repaired with 3-0 vicryl. Episiotomy repaired in standard fashion. Patient  also noted to have a right periurethral tear that was repaired with 3 interrupted 4-0 vicryl sutures.     EBL 1000 ml. Sponge and needle counts correct. Mom and baby were transported to postpartum in stable condition.     Procedure: Vacuum Assisted Vaginal Delivery  Present for Procedure: Dr. Stark  Indication for Vacuum:fetal status  Prior to Initiation of Procedure Risks and Alternatives: Discussed risks of vacuum including abrasions/lacerations to the scalp, cephalohematoma, subgaleal hemorrhage, intracranial hemorrhage, retinal hemorrhage and hyperbilirubinemia for baby. Increased risk of higher degree vaginal tear for mother. Discussed risks of  section including bleeding, infection, damage to surrounding structures of the uterus including bladder, bowel, ureters, muscles, nerves, and baby.  This procedure appeared to have a high likelihood of success: EFW: 7.5, Maternal Pelvis: adequate   Fetal Station at Initial Placement of Vacuum: 2+  Position prior to placement of vacuum noted to be: LOT  Vacuum First Applied: 0330  Duration of Vacuum Procedure: 3.5minutes  Number of Pulls: 3.5 contractions (1st pop off was midway through first contraction)  Number of Pop-offs: 2  No pressure up between contractions  Vacuum pull times 330 (x20 seconds), 0334 (x~1min), 0337 (x~1min), 0341 (x~1min)  Duration of Cumulative Traction Time: 3.5 min  Given pop offs, improvement in tracing and improvement in station, vacuum then abandoned to keep pushing without    Pepper Stark MD   2020 5:36 AM       OB Vaginal Delivery Note    Eliz Mckinney MRN# [id number]   Age: 29 year old YOB: 1990       GA: 40w2d  GP:   Labor Complications: Fetal Intolerance   EBL:   mL  Delivery QBL:    Delivery Type: Vaginal, Vacuum (Extractor)   ROM to Delivery Time: (Delivered) Hours: 14 Minutes: 25   Weight:     1 Minute 5 Minute 10 Minute   Apgar Totals: 8   9        COLLIN  ABBIE;DANN CHONG;SUSY SIMON;DANISHA ANGULO     Delivery Details:  Eliz Mckinney, a 29 year old  female delivered a viable infant with apgars of 8  and 9 . Patient was fully dilated and pushing after   hours   minutes in active labor. Delivery was via vaginal, vacuum (extractor)  to a sterile field under epidural  anesthesia. Infant delivered in         position. Anterior and posterior shoulders delivered without difficulty. The cord was clamped, cut twice and 3 vessels  were noted. Cord blood was obtained in routine fashion with the following disposition: lab .      Cord complications: none   Placenta delivered at 2020  4:22 AM . Placental disposition was Hospital disposal . Fundal massage performed and fundus found to be firm.     Episiotomy: right mediolateral;left mediolateral    Perineum, vagina, cervix were inspected, and the following lacerations were noted:   Perineal lacerations: 2nd       sulcus laceration: bilateral         .    Excellent hemostasis was noted. Needle count correct. Infant and patient in delivery room in good and stable condition.        Labor Event Times    Labor onset date:  20 Onset time:  10:30 AM   Dilation complete date:  20 Complete time:   2:27 AM   Start pushing date/time:  2020 0227      Labor Events     labor?:  Yes   steroids:  None  Labor Type:  Augmentation, AROM  Predominate monitoring during 1st stage:  continuous electronic fetal monitoring     Rupture identifier:  Sac 1  Rupture date/time: 20 1352   Rupture type:  Artificial Rupture of Membranes  Fluid color:  Meconium  Fluid odor:  Normal     Augmentation:  AROM  Augmentation date/time:  20 135   Indications for augmentation:  Ineffective Contraction Pattern  1:1 continuous labor support provided by?:  RN       Delivery/Placenta Date and Time    Delivery Date:  20 Delivery Time:   4:17 AM   Placenta Date/Time:  2020  4:22  AM     Vaginal Counts     Initial count performed by 2 team members:   Two Team Members   Bethanie Yin RN       Needles Suture [place] [employer]   Initial counts 2  5    Added to count  3 20    Final counts       Placed during labor Accounted for at the end of labor   NA NA   Yes Yes   NA NA           Apgars    Living status:  Living   1 Minute 5 Minute 10 Minute 15 Minute 20 Minute   Skin color: 1  1       Heart rate: 2  2       Reflex irritability: 2  2       Muscle tone: 1  2       Respiratory effort: 2  2       Total: 8  9       Apgars assigned by:  YOVANI POLANCO RN     Cord    Vessels:  3 Vessels Complications:  None   Cord Blood Disposition:  Lab Gases Sent?:  No       Resuscitation    Methods:  None   Care at Delivery:  Term Mec  Output in Delivery Room:  Stool     Skin to Skin and Feeding Plan    Skin to skin initiation date/time: 1841    Skin to skin with:  Mother  Skin to skin end date/time:     How do you plan to feed your baby:  Breastfeeding     Labor Events and Shoulder Dystocia    Fetal Tracing Prior to Delivery:  Category 2     Delivery (Maternal) (Provider to Complete) ([id number])    Episiotomy:  Right Mediolateral, Left Mediolateral  Indications for episiotomy:  Instrumented Delivery  Perineal lacerations:  2nd Repaired?:  Yes   Sulcus laceration:  bilateral       Blood Loss  Mother: Eliz Mckinney #[id number]   Start of Mother's Information    IO Blood Loss  20 1030 - 20 0520    None           End of Mother's Information  Mother: Eliz Mckinney #[id number]         Delivery - Provider to Complete ([id number])    Delivering clinician:  Pepper Stark MD  Attempted Delivery Types (Choose all that apply):  Spontaneous Vaginal Delivery  Delivery Type (Choose the 1 that will go to the Birth History):  Vaginal, Vacuum (Extractor)  Verbal Informed Consent Obtained For Vacuum:  Yes Alternate Labor Strategies Discussed (vacuum):   Yes   Emergency Resources Available (vacuum):  Charge Nurse/Team, Resuscitation Team  Type (vacuum):  [place] Accrued Pulling Time (vacuum):  3.5 min   # of Pop-Offs (vacuum):  2    Position (vacuum):  OT Fetal Station (vacuum):  +2   Indication for Operative Vaginal Delivery (vacuum):  Fetal Status   Other personnel:   Provider Role   Bethanie Arreola, RN Delivery Nurse   Ashley Yin RN Charge Nurse   Pepper Stark MD Obstetrician   Fariha Rey APRN CNP Pediatrician         Placenta    Delayed Cord Clamping:  Done  Date/Time:  7/25/2020  4:22 AM  Removal:  Spontaneous  Disposition:  Hospital disposal     Anesthesia    Method:  Epidural  Cervical dilation at placement:  4-7               Pepper Stark MD   The patient is a 87y Female complaining of fall.

## 2023-12-13 ASSESSMENT — PATIENT HEALTH QUESTIONNAIRE - PHQ9
SUM OF ALL RESPONSES TO PHQ QUESTIONS 1-9: 1
SUM OF ALL RESPONSES TO PHQ QUESTIONS 1-9: 1
10. IF YOU CHECKED OFF ANY PROBLEMS, HOW DIFFICULT HAVE THESE PROBLEMS MADE IT FOR YOU TO DO YOUR WORK, TAKE CARE OF THINGS AT HOME, OR GET ALONG WITH OTHER PEOPLE: NOT DIFFICULT AT ALL

## 2023-12-13 ASSESSMENT — ANXIETY QUESTIONNAIRES
GAD7 TOTAL SCORE: 2
IF YOU CHECKED OFF ANY PROBLEMS ON THIS QUESTIONNAIRE, HOW DIFFICULT HAVE THESE PROBLEMS MADE IT FOR YOU TO DO YOUR WORK, TAKE CARE OF THINGS AT HOME, OR GET ALONG WITH OTHER PEOPLE: NOT DIFFICULT AT ALL
1. FEELING NERVOUS, ANXIOUS, OR ON EDGE: SEVERAL DAYS
2. NOT BEING ABLE TO STOP OR CONTROL WORRYING: NOT AT ALL
5. BEING SO RESTLESS THAT IT IS HARD TO SIT STILL: NOT AT ALL
6. BECOMING EASILY ANNOYED OR IRRITABLE: SEVERAL DAYS
GAD7 TOTAL SCORE: 2
7. FEELING AFRAID AS IF SOMETHING AWFUL MIGHT HAPPEN: NOT AT ALL
3. WORRYING TOO MUCH ABOUT DIFFERENT THINGS: NOT AT ALL
4. TROUBLE RELAXING: NOT AT ALL

## 2023-12-20 ENCOUNTER — VIRTUAL VISIT (OUTPATIENT)
Dept: FAMILY MEDICINE | Facility: CLINIC | Age: 33
End: 2023-12-20
Payer: COMMERCIAL

## 2023-12-20 DIAGNOSIS — F41.9 ANXIETY: Primary | ICD-10-CM

## 2023-12-20 PROCEDURE — 99213 OFFICE O/P EST LOW 20 MIN: CPT | Mod: VID | Performed by: NURSE PRACTITIONER

## 2023-12-20 RX ORDER — HYDROXYZINE HYDROCHLORIDE 25 MG/1
TABLET, FILM COATED ORAL
Qty: 14 TABLET | Refills: 1 | Status: SHIPPED | OUTPATIENT
Start: 2023-12-20

## 2023-12-20 RX ORDER — ESCITALOPRAM OXALATE 20 MG/1
20 TABLET ORAL DAILY
Qty: 90 TABLET | Refills: 3 | Status: SHIPPED | OUTPATIENT
Start: 2023-12-20

## 2023-12-20 ASSESSMENT — ENCOUNTER SYMPTOMS
DYSPHORIC MOOD: 1
NERVOUS/ANXIOUS: 1

## 2023-12-20 NOTE — PROGRESS NOTES
"Eliz is a 33 year old who is being evaluated via a billable video visit.      How would you like to obtain your AVS? MyChart  If the video visit is dropped, the invitation should be resent by: Text to cell phone: [phone]  Will anyone else be joining your video visit? No          Assessment & Plan     Post partum depression  Stable-doing well on current dose.  Refill sent.  Plan to follow-up in 1 year  - escitalopram (LEXAPRO) 20 MG tablet; Take 1 tablet (20 mg) by mouth daily    Anxiety  Lexapro working well to control symptoms.  Rarely using hydroxyzine.  Refill sent to have available when needed.  Follow-up in 1 year  - escitalopram (LEXAPRO) 20 MG tablet; Take 1 tablet (20 mg) by mouth daily  - hydrOXYzine HCl (ATARAX) 25 MG tablet; Take 1 tablet twice per day as needed for anxiety.  May cause drowsiness        BMI:   Estimated body mass index is 28.29 kg/m  as calculated from the following:    Height as of 6/7/23: 1.651 m (5' 5\").    Weight as of 6/7/23: 77.1 kg (170 lb).       NICOLE Costa [facility] DASHAWN Wellington is a 33 year old, presenting for the following health issues:  No chief complaint on file.        12/20/2023    10:18 AM   Additional Questions   Roomed by Yahaira   Accompanied by Self         12/20/2023    10:18 AM   Patient Reported Additional Medications   Patient reports taking the following new medications na       Patient with history of depression and anxiety arrived for medication follow-up. PHQ9 and GAD7 completed online.     History of Present Illness       Mental Health Follow-up:  Patient presents to follow-up on Depression & Anxiety.Patient's depression since last visit has been:  Good  The patient is not having other symptoms associated with depression.  Patient's anxiety since last visit has been:  Good  The patient is not having other symptoms associated with anxiety.  Any significant life events: grief or loss  Patient is not " feeling anxious or having panic attacks.  Patient has no concerns about alcohol or drug use.    She eats 2-3 servings of fruits and vegetables daily.She consumes 3 sweetened beverage(s) daily.She exercises with enough effort to increase her heart rate 9 or less minutes per day.  She exercises with enough effort to increase her heart rate 3 or less days per week. She is missing 1 dose(s) of medications per week.  She is not taking prescribed medications regularly due to remembering to take.       Social History     Tobacco Use    Smoking status: Former     Packs/day: 0.00     Years: 4.00     Additional pack years: 0.00     Total pack years: 0.00     Types: Cigarettes    Smokeless tobacco: Former     Quit date: 8/20/2010   Vaping Use    Vaping Use: Never used   Substance Use Topics    Alcohol use: Yes     Comment: occas-quit with pregnancy    Drug use: No         9/10/2020     1:40 PM 12/21/2022     2:57 PM 12/13/2023    10:18 AM   PHQ   PHQ-9 Total Score 2  1   Q9: Thoughts of better off dead/self-harm past 2 weeks Not at all Not at all Not at all         9/10/2020     1:40 PM 12/21/2022     2:57 PM 12/13/2023    10:19 AM   SHARONA-7 SCORE   Total Score   2 (minimal anxiety)   Total Score 2 3 2         Needs to have refill of lexapro. Had been on 10 mg after birth of first child then in 2020 increased to 20 mg after birth of second child.  Feels like is doing well on 20 mg.  No uncontrolled symptoms.  Is sleeping well at night.  No negative side effects from Lexapro.  Does have prescription for hydroxyzine at home-has not used in over 1 year.    Review of Systems   Psychiatric/Behavioral:  Positive for dysphoric mood. The patient is nervous/anxious.           Objective           Vitals:  No vitals were obtained today due to virtual visit.    Physical Exam  Constitutional:       Appearance: Normal appearance.   HENT:      Nose: No congestion.   Eyes:      General: Lids are normal.   Pulmonary:      Effort: No tachypnea,  bradypnea or respiratory distress.   Skin:     Coloration: Skin is not ashen, cyanotic, jaundiced or pale.   Neurological:      Mental Status: She is alert.   Psychiatric:         Mood and Affect: Mood normal.         Speech: Speech normal.         Behavior: Behavior normal.                  Video-Visit Details    Type of service:  Video Visit     Originating Location (pt. Location): Home    Distant Location (provider location):  On-site  Platform used for Video Visit: N-Sided

## 2024-02-20 NOTE — ANESTHESIA PROCEDURE NOTES
Procedure note : epidural catheter  Staff -       CRNA: Mary Canada APRN CRNA    Performed By: CRNA        Pre-Procedure    Location: OB    Procedure Times:7/24/2020 4:09 PM and 7/24/2020 4:21 PM  Pre-Anesthestic Checklist: patient identified, IV checked, risks and benefits discussed, informed consent, monitors and equipment checked, pre-op evaluation and at physician/surgeon's request    Timeout  Correct Patient: Yes   Correct Procedure: Yes   Correct Site: Yes   Correct Laterality: N/A   Correct Position: Yes   Site Marked: N/A   .   Procedure Documentation    .    Procedure: epidural catheter, .   Patient Position:sitting Insertion Site:L4-5  (midline approach) Injection technique: LORT saline   Local skin infiltrated with 5 mL of 1% lidocaine.  FARHAN at 6 cm    Patient Prep/Sterile Barriers; mask, sterile gloves, patient draped.  .  Needle: Touhy needle   Needle Gauge: 17.    Needle Length (Inches) 3.5   # of attempts: 1 and # of redirects:  .    Catheter: 19 G . .  Catheter threaded easily  .  12 cm at skin.   .    Assessment/Narrative  Paresthesias: No.  .  .  Aspiration negative for heme or CSF  . Test dose of 5 mL lidocaine 1.5% w/ 1:200,000 epinephrine at 16:14.  Test dose negative for signs of intravascular, subdural or intrathecal injection. Comments:  VAS pain score prior to epidural:10/10    VAS pain score after epidural:2/10    Pt. Tolerated well, FHR stable.             [de-identified] : HEADACHES, FEVER, COUGH, SNEEZING AND CONGESTION [FreeTextEntry6] : 14 yo F presenting for symptoms for a few months. Since having COVID in December, she has had increased headache, fever, and abdominal pain concerns. Per mom, she will have 1-2 headaches a week, described as a band across the forehead, 5-6/10. Pain does not radiate. Associated with phonophobia, possible aura on occasion. No emesis, nausea, AMS, changes in gait. HA were not a concern in past. Also accompanied by fevers 101-103F, every 1-2 weeks, and generalized abdominal pain. Hx. of DM in family, headaches in father. Wears glasses, last exam was in August.

## 2024-04-09 ENCOUNTER — MYC MEDICAL ADVICE (OUTPATIENT)
Dept: FAMILY MEDICINE | Facility: CLINIC | Age: 34
End: 2024-04-09
Payer: COMMERCIAL

## 2024-04-09 DIAGNOSIS — E66.09 CLASS 1 OBESITY DUE TO EXCESS CALORIES WITHOUT SERIOUS COMORBIDITY WITH BODY MASS INDEX (BMI) OF 31.0 TO 31.9 IN ADULT: Primary | ICD-10-CM

## 2024-04-09 DIAGNOSIS — E66.811 CLASS 1 OBESITY DUE TO EXCESS CALORIES WITHOUT SERIOUS COMORBIDITY WITH BODY MASS INDEX (BMI) OF 31.0 TO 31.9 IN ADULT: Primary | ICD-10-CM

## 2024-04-11 ENCOUNTER — VIRTUAL VISIT (OUTPATIENT)
Dept: FAMILY MEDICINE | Facility: CLINIC | Age: 34
End: 2024-04-11
Payer: COMMERCIAL

## 2024-04-11 DIAGNOSIS — E66.09 CLASS 1 OBESITY DUE TO EXCESS CALORIES WITHOUT SERIOUS COMORBIDITY WITH BODY MASS INDEX (BMI) OF 31.0 TO 31.9 IN ADULT: Primary | ICD-10-CM

## 2024-04-11 DIAGNOSIS — E66.811 CLASS 1 OBESITY DUE TO EXCESS CALORIES WITHOUT SERIOUS COMORBIDITY WITH BODY MASS INDEX (BMI) OF 31.0 TO 31.9 IN ADULT: Primary | ICD-10-CM

## 2024-04-11 PROCEDURE — 99442 PR PHYSICIAN TELEPHONE EVALUATION 11-20 MIN: CPT | Mod: 93 | Performed by: NURSE PRACTITIONER

## 2024-04-11 NOTE — PROGRESS NOTES
"Eliz is a 33 year old who is being evaluated via a billable telephone visit.    What phone number would you like to be contacted at? [phone]  How would you like to obtain your AVS? Daniel  Originating Location (pt. Location): Home    Distant Location (provider location):  On-site    Assessment & Plan     Class 1 obesity due to excess calories without serious comorbidity with body mass index (BMI) of 31.0 to 31.9 in adult  Discussed GLP1 agonist class medications with patient   -Serious reactions including pancreatitis, anaphylaxis, papillary thyroid cancer, thyroid cell and medullary thyroid carcinoma risk discussed. Patient does not have any personal or family history of thyroid malignancies.   Encouraged to continue to be active.  Well-balanced diet.  Plan follow-up in 2 months.  If Wegovy is not covered or if it is unavailable will send prescription for Zepp bound  - Semaglutide-Weight Management (WEGOVY) 0.25 MG/0.5ML pen; Inject 0.25 mg Subcutaneous once a week for 30 days  - Semaglutide-Weight Management (WEGOVY) 0.5 MG/0.5ML pen; Inject 0.5 mg Subcutaneous once a week for 60 days    Prescription drug management      BMI  Estimated body mass index is 28.29 kg/m  as calculated from the following:    Height as of 6/7/23: 1.651 m (5' 5\").    Weight as of 6/7/23: 77.1 kg (170 lb).         Subjective   Eliz is a 33 year old, presenting for the following health issues:  Weight Problem    History of Present Illness       Reason for visit:  I would like to start wegovy    She eats 2-3 servings of fruits and vegetables daily.She consumes 0 sweetened beverage(s) daily.She exercises with enough effort to increase her heart rate 20 to 29 minutes per day.  She exercises with enough effort to increase her heart rate 6 days per week.   She is taking medications regularly.       Phone call completed.     Wondering about starting weight loss medication Wegovy. Discovered that insurance would pay for it. Current " weight 189 lbs. Height 5 5 in since December has been exercising daily. Goes on family walks. At highest weight was 196 pounds. Eats pretty healthy. Has been trying to decrease portions. 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 Does majority of cooking at home and feels like has been eating pretty healthy.     Has not taken medications in the past to help with weight.  No family history of thyroid cancer          Objective           Vitals:  No vitals were obtained today due to virtual visit.    Physical Exam   General: Alert and no distress //Respiratory: No audible wheeze, cough, or shortness of breath // Psychiatric:  Appropriate affect, tone, and pace of words          Phone call duration: 16 minutes  Signed Electronically by: NICOLE Costa CNP

## 2024-04-13 PROBLEM — E66.09 CLASS 1 OBESITY DUE TO EXCESS CALORIES WITHOUT SERIOUS COMORBIDITY WITH BODY MASS INDEX (BMI) OF 31.0 TO 31.9 IN ADULT: Status: ACTIVE | Noted: 2024-04-13

## 2024-04-13 PROBLEM — E66.811 CLASS 1 OBESITY DUE TO EXCESS CALORIES WITHOUT SERIOUS COMORBIDITY WITH BODY MASS INDEX (BMI) OF 31.0 TO 31.9 IN ADULT: Status: ACTIVE | Noted: 2024-04-13

## 2024-04-15 ENCOUNTER — TELEPHONE (OUTPATIENT)
Dept: FAMILY MEDICINE | Facility: CLINIC | Age: 34
End: 2024-04-15
Payer: COMMERCIAL

## 2024-04-15 NOTE — TELEPHONE ENCOUNTER
Prior Authorization Retail Medication Request    Medication/Dose: tirzepatide-Weight Management (ZEPBOUND) 5 MG/0.5ML prefilled pen  Diagnosis and ICD code (if different than what is on RX):  E66.09, Z68.31   New/renewal/insurance change PA/secondary ins. PA:  Previously Tried and Failed:  na  Rationale:  na    Insurance   Primary:  [facility]  Insurance ID:  [id number]     Secondary (if applicable):na  Insurance ID:  dave    Pharmacy Information (if different than what is on RX)  Name:   [facility]  Phone:  [phone]  Fax:     [phone]

## 2024-04-26 NOTE — TELEPHONE ENCOUNTER
PA Initiation    Medication: ZEPBOUND 5 MG/0.5ML SC SOAJ  Insurance Company: [employer] [place] - Phone [phone] Fax [phone]  Pharmacy Filling the Rx: [id number] - [place], [place] - [address] AT [facility]  Filling Pharmacy Phone: [phone]  Filling Pharmacy Fax:    Start Date: 4/26/2024

## 2024-04-29 NOTE — TELEPHONE ENCOUNTER
Prior Authorization Approval    Medication: ZEPBOUND 5 MG/0.5ML SC SOAJ  Authorization Effective Date: 3/27/2024  Authorization Expiration Date: 4/26/2025  Approved Dose/Quantity: 2/28  Reference #: [id number]   Insurance Company: [employer] [place] - Phone [phone] Fax [phone]  Expected CoPay: $    CoPay Card Available:      Financial Assistance Needed:   Which Pharmacy is filling the prescription: [id number] - [place], [place] - [address] [place] [facility] AT [place] [address]  Pharmacy Notified: YES  Patient Notified: YES

## 2024-04-30 ENCOUNTER — MYC MEDICAL ADVICE (OUTPATIENT)
Dept: FAMILY MEDICINE | Facility: CLINIC | Age: 34
End: 2024-04-30
Payer: COMMERCIAL

## 2024-04-30 DIAGNOSIS — E66.811 CLASS 1 OBESITY DUE TO EXCESS CALORIES WITHOUT SERIOUS COMORBIDITY WITH BODY MASS INDEX (BMI) OF 31.0 TO 31.9 IN ADULT: Primary | ICD-10-CM

## 2024-04-30 DIAGNOSIS — E66.09 CLASS 1 OBESITY DUE TO EXCESS CALORIES WITHOUT SERIOUS COMORBIDITY WITH BODY MASS INDEX (BMI) OF 31.0 TO 31.9 IN ADULT: Primary | ICD-10-CM

## 2024-05-01 RX ORDER — METFORMIN HCL 500 MG
500 TABLET, EXTENDED RELEASE 24 HR ORAL
Qty: 90 TABLET | Refills: 0 | Status: SHIPPED | OUTPATIENT
Start: 2024-05-01 | End: 2024-06-12

## 2024-06-09 SDOH — HEALTH STABILITY: PHYSICAL HEALTH: ON AVERAGE, HOW MANY MINUTES DO YOU ENGAGE IN EXERCISE AT THIS LEVEL?: 30 MIN

## 2024-06-09 SDOH — HEALTH STABILITY: PHYSICAL HEALTH: ON AVERAGE, HOW MANY DAYS PER WEEK DO YOU ENGAGE IN MODERATE TO STRENUOUS EXERCISE (LIKE A BRISK WALK)?: 5 DAYS

## 2024-06-09 ASSESSMENT — SOCIAL DETERMINANTS OF HEALTH (SDOH): HOW OFTEN DO YOU GET TOGETHER WITH FRIENDS OR RELATIVES?: TWICE A WEEK

## 2024-06-12 ENCOUNTER — OFFICE VISIT (OUTPATIENT)
Dept: FAMILY MEDICINE | Facility: CLINIC | Age: 34
End: 2024-06-12
Payer: COMMERCIAL

## 2024-06-12 VITALS
BODY MASS INDEX: 30.16 KG/M2 | WEIGHT: 181 LBS | HEART RATE: 60 BPM | RESPIRATION RATE: 18 BRPM | HEIGHT: 65 IN | DIASTOLIC BLOOD PRESSURE: 78 MMHG | OXYGEN SATURATION: 99 % | TEMPERATURE: 98.3 F | SYSTOLIC BLOOD PRESSURE: 116 MMHG

## 2024-06-12 DIAGNOSIS — Z00.00 ROUTINE GENERAL MEDICAL EXAMINATION AT A HEALTH CARE FACILITY: Primary | ICD-10-CM

## 2024-06-12 DIAGNOSIS — Z72.0 TOBACCO ABUSE: ICD-10-CM

## 2024-06-12 DIAGNOSIS — E66.811 CLASS 1 OBESITY DUE TO EXCESS CALORIES WITHOUT SERIOUS COMORBIDITY WITH BODY MASS INDEX (BMI) OF 31.0 TO 31.9 IN ADULT: ICD-10-CM

## 2024-06-12 DIAGNOSIS — E66.09 CLASS 1 OBESITY DUE TO EXCESS CALORIES WITHOUT SERIOUS COMORBIDITY WITH BODY MASS INDEX (BMI) OF 31.0 TO 31.9 IN ADULT: ICD-10-CM

## 2024-06-12 DIAGNOSIS — F43.23 ADJUSTMENT DISORDER WITH MIXED ANXIETY AND DEPRESSED MOOD: ICD-10-CM

## 2024-06-12 PROCEDURE — 36415 COLL VENOUS BLD VENIPUNCTURE: CPT | Performed by: NURSE PRACTITIONER

## 2024-06-12 PROCEDURE — 99395 PREV VISIT EST AGE 18-39: CPT | Performed by: NURSE PRACTITIONER

## 2024-06-12 PROCEDURE — 82947 ASSAY GLUCOSE BLOOD QUANT: CPT | Performed by: NURSE PRACTITIONER

## 2024-06-12 PROCEDURE — 99213 OFFICE O/P EST LOW 20 MIN: CPT | Mod: 25 | Performed by: NURSE PRACTITIONER

## 2024-06-12 PROCEDURE — 80061 LIPID PANEL: CPT | Performed by: NURSE PRACTITIONER

## 2024-06-12 RX ORDER — METFORMIN HCL 500 MG
500 TABLET, EXTENDED RELEASE 24 HR ORAL
Qty: 90 TABLET | Refills: 0 | Status: SHIPPED | OUTPATIENT
Start: 2024-06-12 | End: 2024-09-10

## 2024-06-12 ASSESSMENT — ANXIETY QUESTIONNAIRES
7. FEELING AFRAID AS IF SOMETHING AWFUL MIGHT HAPPEN: NOT AT ALL
5. BEING SO RESTLESS THAT IT IS HARD TO SIT STILL: NOT AT ALL
6. BECOMING EASILY ANNOYED OR IRRITABLE: NOT AT ALL
3. WORRYING TOO MUCH ABOUT DIFFERENT THINGS: NOT AT ALL
IF YOU CHECKED OFF ANY PROBLEMS ON THIS QUESTIONNAIRE, HOW DIFFICULT HAVE THESE PROBLEMS MADE IT FOR YOU TO DO YOUR WORK, TAKE CARE OF THINGS AT HOME, OR GET ALONG WITH OTHER PEOPLE: NOT DIFFICULT AT ALL
1. FEELING NERVOUS, ANXIOUS, OR ON EDGE: NOT AT ALL
GAD7 TOTAL SCORE: 0
GAD7 TOTAL SCORE: 0
2. NOT BEING ABLE TO STOP OR CONTROL WORRYING: NOT AT ALL

## 2024-06-12 ASSESSMENT — PATIENT HEALTH QUESTIONNAIRE - PHQ9
SUM OF ALL RESPONSES TO PHQ QUESTIONS 1-9: 1
5. POOR APPETITE OR OVEREATING: NOT AT ALL

## 2024-06-12 NOTE — PATIENT INSTRUCTIONS
"Patient Education   Preventive Care Advice   This is general advice we often give to help people stay healthy. Your care team may have specific advice just for you. Please talk to your care team about your own preventive care needs.  Lifestyle  Exercise at least 150 minutes each week (30 minutes a day, 5 days a week).  Do muscle strengthening activities 2 days a week. These help control your weight and prevent disease.  No smoking.  Wear sunscreen to prevent skin cancer.  Have your home tested for radon every 2 to 5 years. Radon is a colorless, odorless gas that can harm your lungs. To learn more, go to www.health.[facility].[place] and search for \"Radon in Homes.\"  Keep guns unloaded and locked up in a safe place like a safe or gun vault, or, use a gun lock and hide the keys. Always lock away bullets separately. To learn more, visit [employer].[place].gov and search for \"safe gun storage.\"  Nutrition  Eat 5 or more servings of fruits and vegetables each day.  Try wheat bread, brown rice and whole grain pasta (instead of white bread, rice, and pasta).  Get enough calcium and vitamin D. Check the label on foods and aim for 100% of the RDA (recommended daily allowance).  Regular exams  Have a dental exam and cleaning every 6 months.  See your health care team every year to talk about:  Any changes in your health.  Any medicines your care team has prescribed.  Preventive care, family planning, and ways to prevent chronic diseases.  Shots (vaccines)   HPV shots (up to age 26), if you've never had them before.  Hepatitis B shots (up to age 59), if you've never had them before.  COVID-19 shot: Get this shot when it's due.  Flu shot: Get a flu shot every year.  Tetanus shot: Get a tetanus shot every 10 years.  Pneumococcal, hepatitis A, and RSV shots: Ask your care team if you need these based on your risk.  Shingles shot (for age 50 and up).  General health tests  Diabetes screening:  Starting at age 35, Get screened for diabetes at least " every 3 years.  If you are younger than age 35, ask your care team if you should be screened for diabetes.  Cholesterol test: At age 39, start having a cholesterol test every 5 years, or more often if advised.  Bone density scan (DEXA): At age 50, ask your care team if you should have this scan for osteoporosis (brittle bones).  Hepatitis C: Get tested at least once in your life.  Abdominal aortic aneurysm screening: Talk to your doctor about having this screening if you:  Have ever smoked; and  Are biologically male; and  Are between the ages of 65 and 75.  STIs (sexually transmitted infections)  Before age 24: Ask your care team if you should be screened for STIs.  After age 24: Get screened for STIs if you're at risk. You are at risk for STIs (including HIV) if:  You are sexually active with more than one person.  You don't use condoms every time.  You or a partner was diagnosed with a sexually transmitted infection.  If you are at risk for HIV, ask about PrEP medicine to prevent HIV.  Get tested for HIV at least once in your life, whether you are at risk for HIV or not.  Cancer screening tests  Cervical cancer screening: If you have a cervix, begin getting regular cervical cancer screening tests at age 21. Most people who have regular screenings with normal results can stop after age 65. Talk about this with your provider.  Breast cancer scan (mammogram): If you've ever had breasts, begin having regular mammograms starting at age 40. This is a scan to check for breast cancer.  Colon cancer screening: It is important to start screening for colon cancer at age 45.  Have a colonoscopy test every 10 years (or more often if you're at risk) Or, ask your provider about stool tests like a FIT test every year or Cologuard test every 3 years.  To learn more about your testing options, visit: www.payasUgym/656768.pdf.  For help making a decision, visit: javed/ko94175.  Prostate cancer screening test: If you have a  prostate and are age 55 to 69, ask your provider if you would benefit from a yearly prostate cancer screening test.  Lung cancer screening: If you are a current or former smoker age 50 to 80, ask your care team if ongoing lung cancer screenings are right for you.  For informational purposes only. Not to replace the advice of your health care provider. Copyright   2023 [facility]. All rights reserved. Clinically reviewed by the [facility] Transitions Program. CMP.LY [id number] - REV 04/24.

## 2024-06-12 NOTE — PROGRESS NOTES
"Preventive Care Visit  [facility] NICOLE Flores CNP, Family Medicine  Jun 12, 2024      Assessment & Plan     Class 1 obesity due to excess calories without serious comorbidity with body mass index (BMI) of 31.0 to 31.9 in adult  Noticing benefit from metformin.  Plan to continue at 500 mg daily.  Refill sent.  Follow-up in 6 months  - metFORMIN (GLUCOPHAGE XR) 500 MG 24 hr tablet; Take 1 tablet (500 mg) by mouth daily (with dinner)    Routine general medical examination at a [facility] care facility  Screening guidelines reviewed.   Will obtain screening labs here today.  - Lipid panel reflex to direct LDL Fasting; Future  - Glucose; Future  - Lipid panel reflex to direct LDL Fasting  - Glucose    Adjustment disorder with mixed anxiety and depressed mood  Stable-doing well on current dose of escitalopram.  Has refills available.  Plan to continue at current dose.    Tobacco abuse  Declines being ready to quit.      Nicotine/Tobacco Cessation  She reports that she has been smoking cigarettes. She quit smokeless tobacco use about 13 years ago.  Nicotine/Tobacco Cessation Plan  Information offered: Patient not interested at this time      BMI  Estimated body mass index is 30.12 kg/m  as calculated from the following:    Height as of this encounter: 1.651 m (5' 5\").    Weight as of this encounter: 82.1 kg (181 lb).       Counseling  Appropriate preventive services were discussed with this patient, including applicable screening as appropriate for fall prevention, nutrition, physical activity, Tobacco-use cessation, weight loss and cognition.  Checklist reviewing preventive services available has been given to the patient.  Reviewed patient's diet, addressing concerns and/or questions.       Alberta Wellington is a 33 year old, presenting for the following:  Physical        6/12/2024     4:18 PM   Additional Questions   Roomed by Yahaira   Accompanied by Self         6/12/2024     4:18 PM "   Patient Reported Additional Medications   Patient reports taking the following new medications na          HPI    Patient with history of Anxiety and Depression arrived for Annual Physical, not due for PAP. PHQ9 and GAD7 given in room.     Patient is not fasting for lab work.     Depression and Anxiety   How are you doing with your depression since your last visit? No change  How are you doing with your anxiety since your last visit?  No change  Are you having other symptoms that might be associated with depression or anxiety? No  Have you had a significant life event? No   Do you have any concerns with your use of alcohol or other drugs? No    Social History     Tobacco Use    Smoking status: Former     Current packs/day: 0.00     Types: Cigarettes    Smokeless tobacco: Former     Quit date: 8/20/2010   Vaping Use    Vaping status: Never Used   Substance Use Topics    Alcohol use: Yes     Comment: occas-quit with pregnancy    Drug use: No         9/10/2020     1:40 PM 12/21/2022     2:57 PM 12/13/2023    10:18 AM   PHQ   PHQ-9 Total Score 2  1   Q9: Thoughts of better off dead/self-harm past 2 weeks Not at all Not at all Not at all         9/10/2020     1:40 PM 12/21/2022     2:57 PM 12/13/2023    10:19 AM   SHARONA-7 SCORE   Total Score   2 (minimal anxiety)   Total Score 2 3 2       Suicide Assessment Five-step Evaluation and Treatment (SAFE-T)        6/9/2024   General Health   How would you rate your overall physical health? Good   Feel stress (tense, anxious, or unable to sleep) Not at all         6/9/2024   Nutrition   Three or more servings of calcium each day? Yes   Diet: Other   If other, please elaborate: Smaller, healthier portions   How many servings of fruit and vegetables per day? (!) 2-3   How many sweetened beverages each day? 0-1         6/9/2024   Exercise   Days per week of moderate/strenous exercise 5 days   Average minutes spent exercising at this level 30 min         6/9/2024   Social Factors    Frequency of gathering with friends or relatives Twice a week   Worry food won't last until get money to buy more No   Food not last or not have enough money for food? No   Do you have housing?  Yes   Are you worried about losing your housing? No   Lack of transportation? No   Unable to get utilities (heat,electricity)? No         6/9/2024   Dental   Dentist two times every year? Yes         6/9/2024   TB Screening   Were you born outside of the [place]? No         Today's PHQ-2 Score:       6/12/2024     4:08 PM   PHQ-2 ( 1999 Pfizer)   Q1: Little interest or pleasure in doing things 0   Q2: Feeling down, depressed or hopeless 0   PHQ-2 Score 0   Q1: Little interest or pleasure in doing things Not at all   Q2: Feeling down, depressed or hopeless Not at all   PHQ-2 Score 0           6/9/2024   Substance Use   Alcohol more than 3/day or more than 7/wk No   Do you use any other substances recreationally? No     Social History     Tobacco Use    Smoking status: Former     Current packs/day: 0.00     Types: Cigarettes    Smokeless tobacco: Former     Quit date: 8/20/2010   Vaping Use    Vaping status: Never Used   Substance Use Topics    Alcohol use: Yes     Comment: occas-quit with pregnancy    Drug use: No                  6/9/2024   STI Screening   New sexual partner(s) since last STI/HIV test? No     History of abnormal Pap smear:        Latest Ref Rng & Units 6/7/2023     5:18 PM 9/10/2020     2:12 PM 10/13/2017     3:41 PM   PAP / HPV   PAP  Negative for Intraepithelial Lesion or Malignancy (NILM)      PAP (Historical)   NIL  NIL    HPV 16 DNA Negative Negative      HPV 18 DNA Negative Negative      Other HR HPV Negative Negative              6/9/2024   Contraception/Family Planning   Questions about contraception or family planning No        Reviewed and updated as needed this visit by Provider                    Here today for physical.  Is not fasting.  Has been taking metformin to assist with weight loss.  Has  "noticed since starting metformin that has Decreased appititie and not feeling hungry. Not snacking. Has seen some weight loss.  Does feel like it is working.  No negative side effects.    Continues to take Lexapro for anxiety and depression.  Feels like it is working well.  Has not noticed any negative side effects.  Father-in-law recently passed away.  This caused a lot of stress.  Started smoking again.  Is smoking about 2 packs/week.  Has smoked off and on since high school.  Does have plans to quit again, is not ready.      Last Pap: 2023-normal. Never an abnormal.   Last mammogram: Never, no family history  Last BMD: N/A  Last Colonoscopy: Never, no family history  Last eye exam: yearly  Last dental exam: every 6 mo  Last tetanus vaccine: 8/22  Last influenza vaccine: Did not have  Last shingles vaccine:  N/A  Last pneumonia vaccine: N/A  Last COVID vaccine: Declines  Last COVID booster: Declines  Hep C screen: 2023  HIV screen: done 2022  AAA screen (age 65-78 with smoking hx): N/A  IVD (HTN, Hyperlipid, Smoking): N/A  Lung CA screening (50-77, 20 pk smoking hx) Quit within 15 years: N/A         Objective    Exam  There were no vitals taken for this visit.   Estimated body mass index is 28.29 kg/m  as calculated from the following:    Height as of 6/7/23: 1.651 m (5' 5\").    Weight as of 6/7/23: 77.1 kg (170 lb).    Physical Exam  Constitutional:       Appearance: Normal appearance. She is well-developed.   HENT:      Head: Normocephalic and atraumatic.      Right Ear: Tympanic membrane and external ear normal. No middle ear effusion.      Left Ear: Tympanic membrane and external ear normal.  No middle ear effusion.      Nose: No mucosal edema.   Neck:      Thyroid: No thyromegaly.      Vascular: No carotid bruit.   Cardiovascular:      Rate and Rhythm: Normal rate and regular rhythm.      Heart sounds: Normal heart sounds.   Pulmonary:      Effort: Pulmonary effort is normal.      Breath sounds: Normal breath " sounds.   Abdominal:      General: Bowel sounds are normal.      Palpations: Abdomen is soft.   Skin:     General: Skin is warm and dry.   Neurological:      Mental Status: She is alert.   Psychiatric:         Behavior: Behavior normal.           Signed Electronically by: NICOLE Costa CNP

## 2024-06-13 PROBLEM — Z37.9 VACUUM-ASSISTED VAGINAL DELIVERY: Status: RESOLVED | Noted: 2022-10-14 | Resolved: 2024-06-13

## 2024-06-13 PROBLEM — Z34.80 PRENATAL CARE, SUBSEQUENT PREGNANCY: Status: RESOLVED | Noted: 2022-02-15 | Resolved: 2024-06-13

## 2024-06-13 PROBLEM — Z72.0 TOBACCO ABUSE: Status: ACTIVE | Noted: 2024-06-13

## 2024-06-13 LAB
CHOLEST SERPL-MCNC: 168 MG/DL
FASTING STATUS PATIENT QL REPORTED: NO
FASTING STATUS PATIENT QL REPORTED: NO
GLUCOSE SERPL-MCNC: 110 MG/DL (ref 70–99)
HDLC SERPL-MCNC: 41 MG/DL
LDLC SERPL CALC-MCNC: 94 MG/DL
NONHDLC SERPL-MCNC: 127 MG/DL
TRIGL SERPL-MCNC: 165 MG/DL

## 2024-09-10 ENCOUNTER — MYC MEDICAL ADVICE (OUTPATIENT)
Dept: FAMILY MEDICINE | Facility: CLINIC | Age: 34
End: 2024-09-10
Payer: COMMERCIAL

## 2024-09-10 DIAGNOSIS — E66.811 CLASS 1 OBESITY DUE TO EXCESS CALORIES WITHOUT SERIOUS COMORBIDITY WITH BODY MASS INDEX (BMI) OF 31.0 TO 31.9 IN ADULT: ICD-10-CM

## 2024-09-10 DIAGNOSIS — E66.09 CLASS 1 OBESITY DUE TO EXCESS CALORIES WITHOUT SERIOUS COMORBIDITY WITH BODY MASS INDEX (BMI) OF 31.0 TO 31.9 IN ADULT: ICD-10-CM

## 2024-09-10 RX ORDER — METFORMIN HCL 500 MG
1000 TABLET, EXTENDED RELEASE 24 HR ORAL
Qty: 180 TABLET | Refills: 0 | Status: SHIPPED | OUTPATIENT
Start: 2024-09-10

## 2024-11-06 ENCOUNTER — MYC MEDICAL ADVICE (OUTPATIENT)
Dept: FAMILY MEDICINE | Facility: CLINIC | Age: 34
End: 2024-11-06
Payer: COMMERCIAL

## 2024-11-06 NOTE — TELEPHONE ENCOUNTER
Please assist patient in scheduling an in clinic appointment for pelvic exam.  May place in provider approval slot or same-day slot.    Analilia HAUSERC

## 2024-11-11 ENCOUNTER — OFFICE VISIT (OUTPATIENT)
Dept: FAMILY MEDICINE | Facility: CLINIC | Age: 34
End: 2024-11-11
Payer: COMMERCIAL

## 2024-11-11 VITALS
SYSTOLIC BLOOD PRESSURE: 116 MMHG | TEMPERATURE: 97.8 F | HEIGHT: 65 IN | BODY MASS INDEX: 29.59 KG/M2 | DIASTOLIC BLOOD PRESSURE: 76 MMHG | RESPIRATION RATE: 18 BRPM | OXYGEN SATURATION: 99 % | HEART RATE: 85 BPM | WEIGHT: 177.6 LBS

## 2024-11-11 DIAGNOSIS — Z30.431 IUD CHECK UP: Primary | ICD-10-CM

## 2024-11-11 PROCEDURE — 99213 OFFICE O/P EST LOW 20 MIN: CPT | Performed by: NURSE PRACTITIONER

## 2024-11-11 PROCEDURE — G2211 COMPLEX E/M VISIT ADD ON: HCPCS | Performed by: NURSE PRACTITIONER

## 2024-11-11 NOTE — PROGRESS NOTES
"  Assessment & Plan     IUD check up  IUD strings barely visible.  Will arrange for transvaginal ultrasound.  Full plan will depend on outcome.  - US Pelvic Complete with Transvaginal; Future    The longitudinal plan of care for the diagnosis(es)/condition(s) as documented were addressed during this visit. Due to the added complexity in care, I will continue to support Eliz in the subsequent management and with ongoing continuity of care.     BMI  Estimated body mass index is 29.55 kg/m  as calculated from the following:    Height as of this encounter: 1.651 m (5' 5\").    Weight as of this encounter: 80.6 kg (177 lb 9.6 oz).       Subjective   Elzi is a 34 year old, presenting for the following health issues:  RECHECK        11/11/2024     1:07 PM   Additional Questions   Roomed by Yenny POSADAS         11/11/2024     1:07 PM   Patient Reported Additional Medications   Patient reports taking the following new medications None per patient     History of Present Illness       Reason for visit:  Pelvic exam to check IUD She is missing 1 dose(s) of medications per week.  She is not taking prescribed medications regularly due to remembering to take.       Pt stated cannot feel strings anymore and felt a lump on cervix that she is concerned about.           For the last 2 weeks or so has not been able to feel her IUD strings.  Was previously able to feel them.  Has not been having any abdominal pain, irregular bleeding. Does have some extra discharge. No concerns for STI. Felt firm hard lump in vagina on the left upper side. Is staying about the same size. Not causing any pain.          Objective    /76   Pulse 85   Temp 97.8  F (36.6  C) (Temporal)   Resp 18   Ht 1.651 m (5' 5\")   Wt 80.6 kg (177 lb 9.6 oz)   SpO2 99%   BMI 29.55 kg/m    Body mass index is 29.55 kg/m .  Physical Exam  Constitutional:       Appearance: Normal appearance.   HENT:      Nose: No congestion.   Eyes:      General: Lids are " normal.   Pulmonary:      Effort: No tachypnea, bradypnea or respiratory distress.   Genitourinary:     Cervix: Normal. No cervical motion tenderness.      Comments: IUD strings visible, approximately 1/4-1/2 inch in length.  No abnormal cysts or lesions visualized.  Bimanual exam completed without abnormal findings.  Skin:     Coloration: Skin is not ashen, cyanotic, jaundiced or pale.   Neurological:      Mental Status: She is alert.   Psychiatric:         Mood and Affect: Mood normal.         Speech: Speech normal.         Behavior: Behavior normal.              Signed Electronically by: NICOLE Costa CNP

## 2024-11-13 ENCOUNTER — ANCILLARY PROCEDURE (OUTPATIENT)
Dept: ULTRASOUND IMAGING | Facility: CLINIC | Age: 34
End: 2024-11-13
Attending: NURSE PRACTITIONER
Payer: COMMERCIAL

## 2024-11-13 DIAGNOSIS — Z30.431 IUD CHECK UP: ICD-10-CM

## 2024-11-13 PROCEDURE — 76830 TRANSVAGINAL US NON-OB: CPT | Mod: TC | Performed by: RADIOLOGY

## 2024-11-13 PROCEDURE — 76856 US EXAM PELVIC COMPLETE: CPT | Mod: TC | Performed by: RADIOLOGY

## 2025-02-02 DIAGNOSIS — E66.811 CLASS 1 OBESITY DUE TO EXCESS CALORIES WITHOUT SERIOUS COMORBIDITY WITH BODY MASS INDEX (BMI) OF 31.0 TO 31.9 IN ADULT: ICD-10-CM

## 2025-02-02 DIAGNOSIS — E66.09 CLASS 1 OBESITY DUE TO EXCESS CALORIES WITHOUT SERIOUS COMORBIDITY WITH BODY MASS INDEX (BMI) OF 31.0 TO 31.9 IN ADULT: ICD-10-CM

## 2025-02-03 RX ORDER — METFORMIN HYDROCHLORIDE 500 MG/1
1000 TABLET, EXTENDED RELEASE ORAL
Qty: 60 TABLET | Refills: 0 | Status: SHIPPED | OUTPATIENT
Start: 2025-02-03

## 2025-03-10 DIAGNOSIS — E66.09 CLASS 1 OBESITY DUE TO EXCESS CALORIES WITHOUT SERIOUS COMORBIDITY WITH BODY MASS INDEX (BMI) OF 31.0 TO 31.9 IN ADULT: ICD-10-CM

## 2025-03-10 DIAGNOSIS — E66.811 CLASS 1 OBESITY DUE TO EXCESS CALORIES WITHOUT SERIOUS COMORBIDITY WITH BODY MASS INDEX (BMI) OF 31.0 TO 31.9 IN ADULT: ICD-10-CM

## 2025-03-10 RX ORDER — METFORMIN HYDROCHLORIDE 500 MG/1
1000 TABLET, EXTENDED RELEASE ORAL
Qty: 60 TABLET | Refills: 0 | Status: SHIPPED | OUTPATIENT
Start: 2025-03-10

## 2025-04-04 DIAGNOSIS — F41.9 ANXIETY: ICD-10-CM

## 2025-04-04 NOTE — PROGRESS NOTES
Denied need for review of teaching  [facility] OB Intake Nurse    Patient supplied answers from flow sheet for:  Prenatal OB Questionnaire.  Past Medical History  Have you ever recieved care for your mental health? : (!) Yes  Have you ever been in a major accident or suffered serious trauma?: No  Within the last year, has anyone hit, slapped, kicked or otherwise hurt you?: No  In the last year, has anyone forced you to have sex when you didn't want to?: No    Past Medical History 2   Have you ever received a blood transfusion?: No  Would you accept a blood transfusion if was medically recommended?: Yes  Does anyone in your home smoke?: No   Is your blood type Rh negative?: No  Have you ever ?: (!) Yes  Have you been hospitalized for a nonsurgical reason excluding normal delivery?: No  Have you ever had an abnormal pap smear?: No    Past Medical History (Continued)  Do you have a history of abnormalities of the uterus?: No  Did your mother take JOSEPH or any other hormones when she was pregnant with you?: No  Do you have any other problems we have not asked about which you feel may be important to this pregnancy?: No                    < from: CT Abdomen and Pelvis w/ IV Cont (04.04.25 @ 12:44) >    PROCEDURE:  CT of the Abdomen and Pelvis was performed.  Sagittal and coronal reformats were performed.    FINDINGS:  LOWER CHEST: Within normal limits.    LIVER: Within normal limits.  BILE DUCTS: Normal caliber.  GALLBLADDER: Within normal limits.  SPLEEN: Within normal limits.  PANCREAS: Within normal limits.  ADRENALS: Within normal limits.  KIDNEYS/URETERS: Within normal limits.    BLADDER: Within normal limits.  REPRODUCTIVE ORGANS: Uterus and adnexa within normal limits.    BOWEL: No bowel obstruction. Appendix is fluid-filled and distended to 10   mm with mild wall thickening and enhancement. Adjacent fatty stranding   and free fluid.  PERITONEUM/RETROPERITONEUM: Within normal limits.  VESSELS: Within normal limits.  LYMPH NODES: No lymphadenopathy.  ABDOMINAL WALL: Within normal limits.  BONES: Within normal limits.    IMPRESSION:  Acute appendicitis.      < end of copied text >

## 2025-04-07 RX ORDER — ESCITALOPRAM OXALATE 20 MG/1
20 TABLET ORAL DAILY
Qty: 90 TABLET | Refills: 0 | Status: SHIPPED | OUTPATIENT
Start: 2025-04-07

## 2025-04-15 DIAGNOSIS — E66.09 CLASS 1 OBESITY DUE TO EXCESS CALORIES WITHOUT SERIOUS COMORBIDITY WITH BODY MASS INDEX (BMI) OF 31.0 TO 31.9 IN ADULT: ICD-10-CM

## 2025-04-15 DIAGNOSIS — E66.811 CLASS 1 OBESITY DUE TO EXCESS CALORIES WITHOUT SERIOUS COMORBIDITY WITH BODY MASS INDEX (BMI) OF 31.0 TO 31.9 IN ADULT: ICD-10-CM

## 2025-04-15 RX ORDER — METFORMIN HYDROCHLORIDE 500 MG/1
1000 TABLET, EXTENDED RELEASE ORAL
Qty: 90 TABLET | Refills: 0 | Status: SHIPPED | OUTPATIENT
Start: 2025-04-15

## 2025-06-22 SDOH — HEALTH STABILITY: PHYSICAL HEALTH: ON AVERAGE, HOW MANY DAYS PER WEEK DO YOU ENGAGE IN MODERATE TO STRENUOUS EXERCISE (LIKE A BRISK WALK)?: 2 DAYS

## 2025-06-22 SDOH — HEALTH STABILITY: PHYSICAL HEALTH: ON AVERAGE, HOW MANY MINUTES DO YOU ENGAGE IN EXERCISE AT THIS LEVEL?: 30 MIN

## 2025-06-22 ASSESSMENT — SOCIAL DETERMINANTS OF HEALTH (SDOH): HOW OFTEN DO YOU GET TOGETHER WITH FRIENDS OR RELATIVES?: TWICE A WEEK

## 2025-06-26 ENCOUNTER — OFFICE VISIT (OUTPATIENT)
Dept: FAMILY MEDICINE | Facility: CLINIC | Age: 35
End: 2025-06-26
Attending: NURSE PRACTITIONER
Payer: COMMERCIAL

## 2025-06-26 VITALS
RESPIRATION RATE: 16 BRPM | TEMPERATURE: 96.5 F | HEART RATE: 75 BPM | HEIGHT: 65 IN | SYSTOLIC BLOOD PRESSURE: 98 MMHG | OXYGEN SATURATION: 100 % | BODY MASS INDEX: 28.16 KG/M2 | DIASTOLIC BLOOD PRESSURE: 64 MMHG | WEIGHT: 169 LBS

## 2025-06-26 DIAGNOSIS — Z72.0 VAPES NICOTINE CONTAINING SUBSTANCE: Primary | ICD-10-CM

## 2025-06-26 DIAGNOSIS — F41.9 ANXIETY: ICD-10-CM

## 2025-06-26 DIAGNOSIS — Z30.432 ENCOUNTER FOR IUD REMOVAL: ICD-10-CM

## 2025-06-26 DIAGNOSIS — E66.09 CLASS 1 OBESITY DUE TO EXCESS CALORIES WITHOUT SERIOUS COMORBIDITY WITH BODY MASS INDEX (BMI) OF 31.0 TO 31.9 IN ADULT: ICD-10-CM

## 2025-06-26 DIAGNOSIS — E66.811 CLASS 1 OBESITY DUE TO EXCESS CALORIES WITHOUT SERIOUS COMORBIDITY WITH BODY MASS INDEX (BMI) OF 31.0 TO 31.9 IN ADULT: ICD-10-CM

## 2025-06-26 DIAGNOSIS — Z00.00 ROUTINE GENERAL MEDICAL EXAMINATION AT A HEALTH CARE FACILITY: ICD-10-CM

## 2025-06-26 LAB
CHOLEST SERPL-MCNC: 164 MG/DL
FASTING STATUS PATIENT QL REPORTED: YES
FASTING STATUS PATIENT QL REPORTED: YES
GLUCOSE SERPL-MCNC: 91 MG/DL (ref 70–99)
HDLC SERPL-MCNC: 40 MG/DL
LDLC SERPL CALC-MCNC: 93 MG/DL
NONHDLC SERPL-MCNC: 124 MG/DL
TRIGL SERPL-MCNC: 156 MG/DL

## 2025-06-26 RX ORDER — ESCITALOPRAM OXALATE 20 MG/1
20 TABLET ORAL DAILY
Qty: 90 TABLET | Refills: 0 | Status: SHIPPED | OUTPATIENT
Start: 2025-06-26 | End: 2025-06-26

## 2025-06-26 RX ORDER — ESCITALOPRAM OXALATE 20 MG/1
20 TABLET ORAL DAILY
Qty: 90 TABLET | Refills: 1 | Status: SHIPPED | OUTPATIENT
Start: 2025-06-26

## 2025-06-26 RX ORDER — METFORMIN HYDROCHLORIDE 500 MG/1
1000 TABLET, EXTENDED RELEASE ORAL
Qty: 90 TABLET | Refills: 0 | Status: SHIPPED | OUTPATIENT
Start: 2025-06-26 | End: 2025-06-26

## 2025-06-26 RX ORDER — METFORMIN HYDROCHLORIDE 500 MG/1
1000 TABLET, EXTENDED RELEASE ORAL
Qty: 90 TABLET | Refills: 1 | Status: SHIPPED | OUTPATIENT
Start: 2025-06-26

## 2025-06-26 ASSESSMENT — ANXIETY QUESTIONNAIRES
4. TROUBLE RELAXING: NOT AT ALL
6. BECOMING EASILY ANNOYED OR IRRITABLE: SEVERAL DAYS
GAD7 TOTAL SCORE: 2
7. FEELING AFRAID AS IF SOMETHING AWFUL MIGHT HAPPEN: NOT AT ALL
2. NOT BEING ABLE TO STOP OR CONTROL WORRYING: NOT AT ALL
GAD7 TOTAL SCORE: 2
3. WORRYING TOO MUCH ABOUT DIFFERENT THINGS: NOT AT ALL
IF YOU CHECKED OFF ANY PROBLEMS ON THIS QUESTIONNAIRE, HOW DIFFICULT HAVE THESE PROBLEMS MADE IT FOR YOU TO DO YOUR WORK, TAKE CARE OF THINGS AT HOME, OR GET ALONG WITH OTHER PEOPLE: NOT DIFFICULT AT ALL
8. IF YOU CHECKED OFF ANY PROBLEMS, HOW DIFFICULT HAVE THESE MADE IT FOR YOU TO DO YOUR WORK, TAKE CARE OF THINGS AT HOME, OR GET ALONG WITH OTHER PEOPLE?: NOT DIFFICULT AT ALL
1. FEELING NERVOUS, ANXIOUS, OR ON EDGE: SEVERAL DAYS
7. FEELING AFRAID AS IF SOMETHING AWFUL MIGHT HAPPEN: NOT AT ALL
GAD7 TOTAL SCORE: 2
5. BEING SO RESTLESS THAT IT IS HARD TO SIT STILL: NOT AT ALL

## 2025-06-26 NOTE — PATIENT INSTRUCTIONS
Please go to the pharmacy for your pneumonia vaccine, Prevnar 20.  This vaccine helps protect against 20 types strains of streptococcal pneumonia that can cause serious infections in adults-pneumonia.  After the vaccine it is common to have some injection site redness, warmth and mild swelling.  This should resolve on its own after 24 to 48 hours.  Applying a cool compress to the site can help to ease the discomfort.  Also, you may feel a bit tired and rundown for 28 to 48 hours after receiving the injection.  If you are over age 65 you only need to get this vaccine once.  If you are under age 65 this vaccine should be repeated in 5 years.     Patient Education   Preventive Care Advice   This is general advice given by our system to help you stay healthy. However, your care team may have specific advice just for you. Please talk to your care team about your preventive care needs.  Nutrition  Eat 5 or more servings of fruits and vegetables each day.  Try wheat bread, brown rice and whole grain pasta (instead of white bread, rice, and pasta).  Get enough calcium and vitamin D. Check the label on foods and aim for 100% of the RDA (recommended daily allowance).  Lifestyle  Exercise at least 150 minutes each week  (30 minutes a day, 5 days a week).  Do muscle strengthening activities 2 days a week. These help control your weight and prevent disease.  No smoking.  Wear sunscreen to prevent skin cancer.  Have a dental exam and cleaning every 6 months.  Yearly exams  See your health care team every year to talk about:  Any changes in your health.  Any medicines your care team has prescribed.  Preventive care, family planning, and ways to prevent chronic diseases.  Shots (vaccines)   HPV shots (up to age 26), if you've never had them before.  Hepatitis B shots (up to age 59), if you've never had them before.  COVID-19 shot: Get this shot when it's due.  Flu shot: Get a flu shot every year.  Tetanus shot: Get a tetanus shot  every 10 years.  Pneumococcal, hepatitis A, and RSV shots: Ask your care team if you need these based on your risk.  Shingles shot (for age 50 and up)  General health tests  Diabetes screening:  Starting at age 35, Get screened for diabetes at least every 3 years.  If you are younger than age 35, ask your care team if you should be screened for diabetes.  Cholesterol test: At age 39, start having a cholesterol test every 5 years, or more often if advised.  Bone density scan (DEXA): At age 50, ask your care team if you should have this scan for osteoporosis (brittle bones).  Hepatitis C: Get tested at least once in your life.  STIs (sexually transmitted infections)  Before age 24: Ask your care team if you should be screened for STIs.  After age 24: Get screened for STIs if you're at risk. You are at risk for STIs (including HIV) if:  You are sexually active with more than one person.  You don't use condoms every time.  You or a partner was diagnosed with a sexually transmitted infection.  If you are at risk for HIV, ask about PrEP medicine to prevent HIV.  Get tested for HIV at least once in your life, whether you are at risk for HIV or not.  Cancer screening tests  Cervical cancer screening: If you have a cervix, begin getting regular cervical cancer screening tests starting at age 21.  Breast cancer scan (mammogram): If you've ever had breasts, begin having regular mammograms starting at age 40. This is a scan to check for breast cancer.  Colon cancer screening: It is important to start screening for colon cancer at age 45.  Have a colonoscopy test every 10 years (or more often if you're at risk) Or, ask your provider about stool tests like a FIT test every year or Cologuard test every 3 years.  To learn more about your testing options, visit:   .  For help making a decision, visit:   https://bit.ly/ds22944.  Prostate cancer screening test: If you have a prostate, ask your care team if a prostate cancer screening  "test (PSA) at age 55 is right for you.  Lung cancer screening: If you are a current or former smoker ages 50 to 80, ask your care team if ongoing lung cancer screenings are right for you.  For informational purposes only. Not to replace the advice of your health care provider. Copyright   2023 [facility]. All rights reserved. Clinically reviewed by the [facility]. [employer] 251506 - REV 01/24.  9 Ways to Cut Back on Drinking  Maybe you've found yourself drinking more alcohol than you'd prefer. If you want to cut back, here are some ideas to try.    Think before you drink.  Do you really want a drink, or is it just a habit? If you're used to having a drink at a certain time, try doing something else then.     Look for substitutes.  Find some no-alcohol drinks that you enjoy, like flavored seltzer water, tea with honey, or tonic with a slice of lime. Or try alcohol-free beer or \"virgin\" cocktails (without the alcohol).     Drink more water.  Use water to quench your thirst. Drink a glass of water before you have any alcohol. Have another glass along with every drink or between drinks.     Shrink your drink.  For example, have a bottle of beer instead of a pint. Use a smaller glass for wine. Choose drinks with lower alcohol content (ABV%). Or use less liquor and more mixer in cocktails.     Slow down.  It's easy to drink quickly and without thinking about it. Pay attention, and make each drink last longer.     Do the math.  Total up how much you spend on alcohol each month. How much is that a year? If you cut back, what could you do with the money you save?     Take a break.  Choose a day or two each week when you won't drink at all. Notice how you feel on those days, physically and emotionally. How did you sleep? Do you feel better? Over time, add more break days.     Count calories.  Would you like to lose some weight? For some people that's a good motivator for cutting back. " "Figure out how many calories are in each drink. How many does that add up to in a day? In a week? In a month?     Practice saying no.  Be ready when someone offers you a drink. Try: \"Thanks, I've had enough.\" Or \"Thanks, but I'm cutting back.\" Or \"No, thanks. I feel better when I drink less.\"   Current as of: August 20, 2024  Content Version: 14.5    2719-5178 [employer].   Care instructions adapted under license by your healthcare professional. If you have questions about a medical condition or this instruction, always ask your healthcare professional. [employer] disclaims any warranty or liability for your use of this information.  Substance Use Disorder: Care Instructions  Overview     You can improve your life and health by stopping your use of alcohol or drugs. When you don't drink or use drugs, you may feel and sleep better. You may get along better with your family, friends, and coworkers. There are medicines and programs that can help with substance use disorder.  How can you care for yourself at home?  Here are some ways to help you stay sober and prevent relapse.  If you have been given medicine to help keep you sober or reduce your cravings, be sure to take it exactly as prescribed.  Talk to your doctor about programs that can help you stop using drugs or drinking alcohol.  Do not keep alcohol or drugs in your home.  Plan ahead. Think about what you'll say if other people ask you to drink or use drugs. Try not to spend time with people who drink or use drugs.  Use the time and money spent on drinking or drugs to do something that's important to you.  Preventing a relapse  Have a plan to deal with relapse. Learn to recognize changes in your thinking that lead you to drink or use drugs. Get help before you start to drink or use drugs again.  Try to stay away from situations, friends, or places that may lead you to drink or use drugs.  If you feel the need to drink alcohol or use " drugs again, seek help right away. Call a trusted friend or family member. Some people get support from organizations such as Narcotics Anonymous or [employer] or from treatment facilities.  If you relapse, get help as soon as you can. Some people make a plan with another person that outlines what they want that person to do for them if they relapse. The plan usually includes how to handle the relapse and who to notify in case of relapse.  Don't give up. Remember that a relapse doesn't mean that you have failed. Use the experience to learn the triggers that lead you to drink or use drugs. Then quit again. Recovery is a lifelong process. Many people have several relapses before they are able to quit for good.  Follow-up care is a key part of your treatment and safety. Be sure to make and go to all appointments, and call your doctor if you are having problems. It's also a good idea to know your test results and keep a list of the medicines you take.  When should you call for help?   Call 911  anytime you think you may need emergency care. For example, call if you or someone else:    Has overdosed or has withdrawal signs. Be sure to tell the emergency workers that you are or someone else is using or trying to quit using drugs. Overdose or withdrawal signs may include:  Losing consciousness.  Seizure.  Seeing or hearing things that aren't there (hallucinations).     Is thinking or talking about suicide or harming others.   Where to get help 24 hours a day, 7 days a week   If you or someone you know talks about suicide, self-harm, a mental health crisis, a substance use crisis, or any other kind of emotional distress, get help right away. You can:    Call the Suicide and Crisis Lifeline at 988.     Call 7-002-710-TALK (1-393.891.2559).     Text HOME to 709149 to access the Crisis Text Line.   Consider saving these numbers in your phone.  Go to [employer] for more information or to chat online.  Call your doctor  "now or seek immediate medical care if:    You are having withdrawal symptoms. These may include nausea or vomiting, sweating, shakiness, and anxiety.   Watch closely for changes in your health, and be sure to contact your doctor if:    You have a relapse.     You need more help or support to stop.   Where can you learn more?  Go to https://www.[employer].net/patiented  Enter H573 in the search box to learn more about \"Substance Use Disorder: Care Instructions.\"  Current as of: August 20, 2024  Content Version: 14.5    4958-7912 [employer].   Care instructions adapted under license by your healthcare professional. If you have questions about a medical condition or this instruction, always ask your healthcare professional. [employer] disclaims any warranty or liability for your use of this information.       "

## 2025-06-26 NOTE — PROGRESS NOTES
"Preventive Care Visit  [facility] NICOLE Flores CNP, Family Medicine  Jun 26, 2025      Assessment & Plan     Vapes nicotine containing substance  Not ready to quit.  Notify when is ready and would be happy to provide NRT    Routine general medical examination at a health care facility  Screening guidelines reviewed.   - Lipid panel reflex to direct LDL Fasting; Future  - Glucose; Future    Class 1 obesity due to excess calories without serious comorbidity with body mass index (BMI) of 31.0 to 31.9 in adult  Working well.  Congratulated on weight loss.  Plan to continue at current dose.  Follow-up in 6 months  - metFORMIN (GLUCOPHAGE XR) 500 MG 24 hr tablet; Take 2 tablets (1,000 mg) by mouth daily (with dinner).    Anxiety  Post partum depression  Stable-doing well on current.  Refill sent.  Follow-up in 6 months  - escitalopram (LEXAPRO) 20 MG tablet; Take 1 tablet (20 mg) by mouth daily.    Encounter for IUD removal  Tolerated without incident.  Contraception- with vasectomy.  - REMOVE INTRAUTERINE DEVICE    The longitudinal plan of care for the diagnosis(es)/condition(s) as documented were addressed during this visit. Due to the added complexity in care, I will continue to support Eliz in the subsequent management and with ongoing continuity of care.         BMI  Estimated body mass index is 27.82 kg/m  as calculated from the following:    Height as of this encounter: 1.66 m (5' 5.35\").    Weight as of this encounter: 76.7 kg (169 lb).       Counseling  Appropriate preventive services were addressed with this patient via screening, questionnaire, or discussion as appropriate for fall prevention, nutrition, physical activity, Tobacco-use cessation, social engagement, weight loss and cognition.  Checklist reviewing preventive services available has been given to the patient.  Reviewed patient's diet, addressing concerns and/or questions.   She is at risk for lack of " exercise and has been provided with information to increase physical activity for the benefit of her well-being.   The patient reports drinking more than 3 alcoholic drinks per day and/or more than 7 drhnks per week. The patient was counseled and given information about possible harmful effects of excessive alcohol intake.          Alberta Wellington is a 34 year old, presenting for the following:  Physical        6/26/2025     6:52 AM   Additional Questions   Roomed by Yahaira   Accompanied by Self         6/26/2025     6:52 AM   Patient Reported Additional Medications   Patient reports taking the following new medications NA          HPI       Patient with history of Anxiety and Weight Management arrived for Annual Physical, not due for PAP, undressing for breast exam. GAD7 completed online.     Patient is fasting for lab work.     Patient would also like IUD Removed at todays visit.     IUD Hook and Ringed Forceps placed in room on counter.     Medication Followup of Metformin   Taking Medication as prescribed: yes  Side Effects:  None  Medication Helping Symptoms:  yes    Wt Readings from Last 2 Encounters:   11/11/24 80.6 kg (177 lb 9.6 oz)   06/12/24 82.1 kg (181 lb)        Anxiety   How are you doing with your anxiety since your last visit? No change  Are you having other symptoms that might be associated with anxiety? No  Have you had a significant life event? No   Are you feeling depressed? No  Do you have any concerns with your use of alcohol or other drugs? No    Social History     Tobacco Use    Smoking status: Every Day     Current packs/day: 0.00     Types: Vaping Device, Cigarettes    Smokeless tobacco: Former     Quit date: 8/20/2010   Vaping Use    Vaping status: Every Day   Substance Use Topics    Alcohol use: Yes     Comment: occas-quit with pregnancy    Drug use: No         12/13/2023    10:19 AM 6/12/2024     4:56 PM 6/26/2025     6:45 AM   SHARONA-7 SCORE   Total Score 2 (minimal anxiety)  2  (minimal anxiety)   Total Score 2 0 2        Patient-reported         12/21/2022     2:57 PM 12/13/2023    10:18 AM 6/12/2024     4:56 PM   PHQ   PHQ-9 Total Score  1 1   Q9: Thoughts of better off dead/self-harm past 2 weeks Not at all Not at all Not at all           6/22/2025   General Health   How would you rate your overall physical health? Good   Feel stress (tense, anxious, or unable to sleep) Only a little   (!) STRESS CONCERN      6/22/2025   Nutrition   Three or more servings of calcium each day? Yes   Diet: Regular (no restrictions)   How many servings of fruit and vegetables per day? (!) 2-3   How many sweetened beverages each day? 0-1         6/22/2025   Exercise   Days per week of moderate/strenous exercise 2 days   Average minutes spent exercising at this level 30 min   (!) EXERCISE CONCERN      6/22/2025   Social Factors   Frequency of gathering with friends or relatives Twice a week   Worry food won't last until get money to buy more No   Food not last or not have enough money for food? No   Do you have housing? (Housing is defined as stable permanent housing and does not include staying outside in a car, in a tent, in an abandoned building, in an overnight shelter, or couch-surfing.) Yes   Are you worried about losing your housing? No   Lack of transportation? No   Unable to get utilities (heat,electricity)? No         6/22/2025   Dental   Dentist two times every year? Yes               6/22/2025   Substance Use   Alcohol more than 3/day or more than 7/wk Yes   How often do you have a drink containing alcohol 2 to 3 times a week   How many alcohol drinks on typical day 3 or 4   How often do you have 5+ drinks at one occasion Monthly   Audit 2/3 Score 3   How often not able to stop drinking once started Never   How often failed to do what normally expected Never   How often needed first drink in am after a heavy drinking session Never   How often feeling of guilt or remorse after drinking Less than  monthly   How often unable to remember what happened the night before Never   Have you or someone else been injured because of your drinking No   Has anyone been concerned or suggested you cut down on drinking No   TOTAL SCORE - AUDIT 7   Do you use any other substances recreationally? (!) CANNABIS PRODUCTS     Social History     Tobacco Use    Smoking status: Every Day     Current packs/day: 0.00     Types: Vaping Device, Cigarettes    Smokeless tobacco: Former     Quit date: 8/20/2010   Vaping Use    Vaping status: Every Day   Substance Use Topics    Alcohol use: Yes     Comment: occas-quit with pregnancy    Drug use: No                6/22/2025   STI Screening   New sexual partner(s) since last STI/HIV test? No     History of abnormal Pap smear:         Latest Ref Rng & Units 6/7/2023     5:18 PM 9/10/2020     2:12 PM 10/13/2017     3:41 PM   PAP / HPV   PAP  Negative for Intraepithelial Lesion or Malignancy (NILM)      PAP (Historical)   NIL  NIL    HPV 16 DNA Negative Negative      HPV 18 DNA Negative Negative      Other HR HPV Negative Negative              6/22/2025   Contraception/Family Planning   Questions about contraception or family planning No   What are your periods like? Not currently having periods        Reviewed and updated as needed this visit by Provider                    Here for px. Is fasting  Doing well with lexapro-feels some depression and anxity but is managable  Hydroxazine a few times per year.   Active with kids, bike rides and walks  Lost about 20 pounds with metformin      Last Pap: 2023-normal. Never an abnormal.   Last mammogram: Never, no family history  Last BMD: N/A  Last Colonoscopy: Never, no family history  Last eye exam: yearly  Last dental exam: every 6 mo  Last tetanus vaccine: 8/22  Last influenza vaccine: Did not have  Last shingles vaccine:  N/A  Last pneumonia vaccine: N/A  Last COVID vaccine: Declines  Last COVID booster: Declines  Hep C screen: 2023  HIV screen:  "done 2022  Contraception:  with vasectomy  Hep B vaccine: done   AAA screen (age 65-78 with smoking hx): N/A  IVD (HTN, Hyperlipid, Smoking): N/A  Lung CA screening (50-77, 20 pk smoking hx) Quit within 15 years: N/A         Objective    Exam  There were no vitals taken for this visit.   Estimated body mass index is 29.55 kg/m  as calculated from the following:    Height as of 11/11/24: 1.651 m (5' 5\").    Weight as of 11/11/24: 80.6 kg (177 lb 9.6 oz).    Physical Exam  Constitutional:       Appearance: Normal appearance. She is well-developed.   HENT:      Head: Normocephalic and atraumatic.      Right Ear: Tympanic membrane and external ear normal. No middle ear effusion.      Left Ear: Tympanic membrane and external ear normal.  No middle ear effusion.      Nose: No mucosal edema.   Neck:      Thyroid: No thyromegaly.      Vascular: No carotid bruit.   Cardiovascular:      Rate and Rhythm: Normal rate and regular rhythm.      Heart sounds: Normal heart sounds.   Pulmonary:      Effort: Pulmonary effort is normal.      Breath sounds: Normal breath sounds.   Abdominal:      General: Bowel sounds are normal.      Palpations: Abdomen is soft.   Genitourinary:     Comments: IUD strings visible   Skin:     General: Skin is warm and dry.   Neurological:      Mental Status: She is alert.   Psychiatric:         Behavior: Behavior normal.     Consent obtained to remove IUD.  IUD strings visualized at cervical os.  Strings  grasped with ring forceps and removed without incident.  IUD intact.  Patient tolerated well.  Small amount of bleeding from cervix.      Signed Electronically by: NICOLE Costa CNP    Answers submitted by the patient for this visit:  Patient Health Questionnaire (G7) (Submitted on 6/26/2025)  SHARONA 7 TOTAL SCORE: 2    "

## 2025-06-30 ENCOUNTER — RESULTS FOLLOW-UP (OUTPATIENT)
Dept: FAMILY MEDICINE | Facility: CLINIC | Age: 35
End: 2025-06-30